# Patient Record
Sex: FEMALE | Race: WHITE | NOT HISPANIC OR LATINO | Employment: OTHER | URBAN - METROPOLITAN AREA
[De-identification: names, ages, dates, MRNs, and addresses within clinical notes are randomized per-mention and may not be internally consistent; named-entity substitution may affect disease eponyms.]

---

## 2016-05-02 LAB
CHOLESTEROL, TOTAL: 141
HDLC SERPL-MCNC: 53 MG/DL
LDLC SERPL CALC-MCNC: 62 MG/DL
TRIGL SERPL-MCNC: 132 MG/DL

## 2016-12-20 LAB — A1C: 8.9

## 2017-02-02 ENCOUNTER — OFFICE VISIT (OUTPATIENT)
Dept: INTERNAL MEDICINE | Facility: CLINIC | Age: 67
End: 2017-02-02
Payer: MEDICARE

## 2017-02-02 VITALS
OXYGEN SATURATION: 96 % | HEART RATE: 64 BPM | BODY MASS INDEX: 32.03 KG/M2 | TEMPERATURE: 96 F | WEIGHT: 187.63 LBS | DIASTOLIC BLOOD PRESSURE: 72 MMHG | HEIGHT: 64 IN | SYSTOLIC BLOOD PRESSURE: 138 MMHG

## 2017-02-02 DIAGNOSIS — E11.8 TYPE 2 DIABETES MELLITUS WITH COMPLICATION, WITH LONG-TERM CURRENT USE OF INSULIN: ICD-10-CM

## 2017-02-02 DIAGNOSIS — I15.2 HYPERTENSION ASSOCIATED WITH DIABETES: ICD-10-CM

## 2017-02-02 DIAGNOSIS — E11.59 HYPERTENSION ASSOCIATED WITH DIABETES: ICD-10-CM

## 2017-02-02 DIAGNOSIS — Z79.4 TYPE 2 DIABETES MELLITUS WITH COMPLICATION, WITH LONG-TERM CURRENT USE OF INSULIN: ICD-10-CM

## 2017-02-02 DIAGNOSIS — K59.09 CHRONIC CONSTIPATION: ICD-10-CM

## 2017-02-02 DIAGNOSIS — K21.9 GASTROESOPHAGEAL REFLUX DISEASE, ESOPHAGITIS PRESENCE NOT SPECIFIED: ICD-10-CM

## 2017-02-02 DIAGNOSIS — I25.10 CORONARY ARTERY DISEASE, ANGINA PRESENCE UNSPECIFIED, UNSPECIFIED VESSEL OR LESION TYPE, UNSPECIFIED WHETHER NATIVE OR TRANSPLANTED HEART: ICD-10-CM

## 2017-02-02 DIAGNOSIS — F33.1 MODERATE EPISODE OF RECURRENT MAJOR DEPRESSIVE DISORDER: ICD-10-CM

## 2017-02-02 DIAGNOSIS — Z86.73 HISTORY OF CVA (CEREBROVASCULAR ACCIDENT): ICD-10-CM

## 2017-02-02 DIAGNOSIS — G20.A1 PARKINSON DISEASE: ICD-10-CM

## 2017-02-02 DIAGNOSIS — Z85.3 HISTORY OF BREAST CANCER: ICD-10-CM

## 2017-02-02 DIAGNOSIS — E11.69 HYPERLIPIDEMIA ASSOCIATED WITH TYPE 2 DIABETES MELLITUS: ICD-10-CM

## 2017-02-02 DIAGNOSIS — E78.5 HYPERLIPIDEMIA ASSOCIATED WITH TYPE 2 DIABETES MELLITUS: ICD-10-CM

## 2017-02-02 PROCEDURE — 99999 PR PBB SHADOW E&M-NEW PATIENT-LVL III: CPT | Mod: PBBFAC,,, | Performed by: INTERNAL MEDICINE

## 2017-02-02 PROCEDURE — 99203 OFFICE O/P NEW LOW 30 MIN: CPT | Mod: PBBFAC,PO | Performed by: INTERNAL MEDICINE

## 2017-02-02 PROCEDURE — 99204 OFFICE O/P NEW MOD 45 MIN: CPT | Mod: S$PBB,,, | Performed by: INTERNAL MEDICINE

## 2017-02-02 RX ORDER — CARBIDOPA AND LEVODOPA 25; 100 MG/1; MG/1
2 TABLET ORAL 4 TIMES DAILY
COMMUNITY
Start: 2016-05-31

## 2017-02-02 RX ORDER — LISINOPRIL 20 MG/1
20 TABLET ORAL DAILY
COMMUNITY
End: 2017-02-02 | Stop reason: SDUPTHER

## 2017-02-02 RX ORDER — CLOPIDOGREL BISULFATE 75 MG/1
75 TABLET ORAL DAILY
COMMUNITY
Start: 2016-12-19 | End: 2017-02-02

## 2017-02-02 RX ORDER — ASPIRIN 81 MG/1
81 TABLET ORAL
COMMUNITY

## 2017-02-02 RX ORDER — GLIMEPIRIDE 4 MG/1
4 TABLET ORAL 2 TIMES DAILY
COMMUNITY
Start: 2016-11-26 | End: 2017-05-31 | Stop reason: SDUPTHER

## 2017-02-02 RX ORDER — PANTOPRAZOLE SODIUM 40 MG/1
40 TABLET, DELAYED RELEASE ORAL DAILY
COMMUNITY
Start: 2017-01-26 | End: 2017-08-14 | Stop reason: SDUPTHER

## 2017-02-02 RX ORDER — DEXTROSE 4 G
TABLET,CHEWABLE ORAL
COMMUNITY
Start: 2013-10-28

## 2017-02-02 RX ORDER — BUPROPION HYDROCHLORIDE 300 MG/1
300 TABLET ORAL DAILY
COMMUNITY
Start: 2017-01-02 | End: 2017-02-02 | Stop reason: SDUPTHER

## 2017-02-02 RX ORDER — ATORVASTATIN CALCIUM 80 MG/1
80 TABLET, FILM COATED ORAL DAILY
COMMUNITY
Start: 2016-11-04 | End: 2017-02-02 | Stop reason: SDUPTHER

## 2017-02-02 RX ORDER — BUPROPION HYDROCHLORIDE 300 MG/1
300 TABLET ORAL DAILY
Qty: 90 TABLET | Refills: 3 | Status: SHIPPED | OUTPATIENT
Start: 2017-02-02 | End: 2018-03-21 | Stop reason: SDUPTHER

## 2017-02-02 RX ORDER — LUBIPROSTONE 24 UG/1
CAPSULE, GELATIN COATED ORAL
Refills: 0 | COMMUNITY
Start: 2017-01-16 | End: 2017-02-20 | Stop reason: SDUPTHER

## 2017-02-02 RX ORDER — LISINOPRIL 20 MG/1
20 TABLET ORAL DAILY
Qty: 30 TABLET | Refills: 3 | Status: SHIPPED | OUTPATIENT
Start: 2017-02-02 | End: 2017-02-08 | Stop reason: SDUPTHER

## 2017-02-02 RX ORDER — ALPRAZOLAM 0.25 MG/1
0.25 TABLET ORAL 3 TIMES DAILY PRN
COMMUNITY
Start: 2016-10-17 | End: 2017-07-19 | Stop reason: SDUPTHER

## 2017-02-02 RX ORDER — ATORVASTATIN CALCIUM 80 MG/1
80 TABLET, FILM COATED ORAL DAILY
Qty: 90 TABLET | Refills: 3 | Status: SHIPPED | OUTPATIENT
Start: 2017-02-02 | End: 2018-02-20 | Stop reason: SDUPTHER

## 2017-02-02 RX ORDER — INSULIN GLARGINE 100 [IU]/ML
42 INJECTION, SOLUTION SUBCUTANEOUS NIGHTLY
Qty: 3 BOX | Refills: 3 | Status: SHIPPED | OUTPATIENT
Start: 2017-02-02 | End: 2017-08-10 | Stop reason: SDUPTHER

## 2017-02-02 RX ORDER — LISINOPRIL 20 MG/1
20 TABLET ORAL DAILY
Qty: 90 TABLET | Refills: 3 | Status: SHIPPED | OUTPATIENT
Start: 2017-02-02 | End: 2017-02-02 | Stop reason: SDUPTHER

## 2017-02-02 RX ORDER — LANCING DEVICE
EACH MISCELLANEOUS
COMMUNITY
Start: 2013-10-28

## 2017-02-02 RX ORDER — INSULIN GLARGINE 100 [IU]/ML
INJECTION, SOLUTION SUBCUTANEOUS
Refills: 5 | COMMUNITY
Start: 2017-01-13 | End: 2017-02-02 | Stop reason: SDUPTHER

## 2017-02-02 RX ORDER — LANCETS 28 GAUGE
1 EACH MISCELLANEOUS
COMMUNITY
Start: 2014-12-10

## 2017-02-02 NOTE — MR AVS SNAPSHOT
[facility]  [address] [facility] [address]  Phone: [phone]                  Mari Mayorga   2017 9:20 AM   Office Visit    Description:  Female : 1950   Provider:  Rob Sauceda MD   Department:  [facility]           Reason for Visit     Establish Care     Annual Exam           Diagnoses this Visit        Comments    Type 2 diabetes mellitus with complication, with long-term current use of insulin         Hypertension associated with diabetes         Hyperlipidemia associated with type 2 diabetes mellitus         Parkinson disease         Coronary artery disease, angina presence unspecified, unspecified vessel or lesion type, unspecified whether native or transplanted heart         Gastroesophageal reflux disease, esophagitis presence not specified         Chronic constipation         History of breast cancer         History of CVA (cerebrovascular accident)                To Do List           Future Appointments        Provider Department Dept Phone    2/3/2017 8:00 AM LABORATORY, [facility] Laboratory [phone]    2/3/2017 8:10 AM SPECIMEN, [facility] Specimen Laboratory [phone]    2017 8:40 AM LABORATORY, [facility] - Laboratory [phone]    5/10/2017 8:00 AM Rob Sauceda MD [facility] Internal Medicine [phone]      Goals (5 Years of Data)     None       These Medications        Disp Refills Start End    buPROPion (WELLBUTRIN XL) 300 MG 24 hr tablet 90 tablet 3 2017     Take 1 tablet (300 mg total) by mouth once daily. - Oral    Pharmacy: "[facility] - [address] Ph #: [phone]       lisinopril (PRINIVIL,ZESTRIL) 20 MG tablet 30 tablet 3 2017     Take 1 tablet (20 mg total) by mouth once daily. Takes two tablets daily - Oral    Pharmacy: [facility]/pharmacy #[id number] - [place] [address] [facility] Ph #: [phone]       atorvastatin (LIPITOR)  80 MG tablet 90 tablet 3 2/2/2017     Take 1 tablet (80 mg total) by mouth once daily. - Oral    Pharmacy: [facility] - [address] Ph #: [phone]       LANTUS SOLOSTAR 100 unit/mL (3 mL) InPn pen 3 Box 3 2/2/2017     Inject 42 Units into the skin every evening. - Subcutaneous    Pharmacy: [facility]/pharmacy #[id number] [place] [address] [facility] Ph #: [phone]         [facility] On Call     [facility] On Call Nurse Care Line - 24/7 Assistance  Registered nurses in the [facility] On Call Center provide clinical advisement, health education, appointment booking, and other advisory services.  Call for this free service at [phone].             Medications           Message regarding Medications     Verify the changes and/or additions to your medication regime listed below are the same as discussed with your clinician today.  If any of these changes or additions are incorrect, please notify your healthcare provider.        START taking these NEW medications        Refills    buPROPion (WELLBUTRIN XL) 300 MG 24 hr tablet 3    Sig: Take 1 tablet (300 mg total) by mouth once daily.    Class: Normal    Route: Oral    lisinopril (PRINIVIL,ZESTRIL) 20 MG tablet 3    Sig: Take 1 tablet (20 mg total) by mouth once daily. Takes two tablets daily    Class: Normal    Route: Oral    atorvastatin (LIPITOR) 80 MG tablet 3    Sig: Take 1 tablet (80 mg total) by mouth once daily.    Class: Normal    Route: Oral    LANTUS SOLOSTAR 100 unit/mL (3 mL) InPn pen 3    Sig: Inject 42 Units into the skin every evening.    Class: Normal    Route: Subcutaneous      STOP taking these medications     clopidogrel (PLAVIX) 75 mg tablet Take 75 mg by mouth once daily.            Verify that the below list of medications is an accurate representation of the medications you are currently taking.  If none reported, the list may be blank. If incorrect, please contact your healthcare provider. Carry this list with you  "in case of emergency.           Current Medications     alprazolam (XANAX) 0.25 MG tablet Take 0.25 mg by mouth 3 (three) times daily as needed.     AMITIZA 24 mcg Cap TAKE 1 CAPSULE BY MOUTH 2 (TWO) TIMES DAILY WITH MEALS FOR 30 DAYS.    aspirin (ECOTRIN) 81 MG EC tablet Take 81 mg by mouth.    atorvastatin (LIPITOR) 80 MG tablet Take 1 tablet (80 mg total) by mouth once daily.    blood sugar diagnostic Strp Check blood sugar (4) times daily    blood-glucose meter (CLEVER CHOICE BLOOD GLUC SYS) Misc     buPROPion (WELLBUTRIN XL) 300 MG 24 hr tablet Take 1 tablet (300 mg total) by mouth once daily.    carbidopa-levodopa  mg (SINEMET)  mg per tablet Take 2 tablets by mouth 4 (four) times daily.     glimepiride (AMARYL) 4 MG tablet Take 4 mg by mouth 2 (two) times daily.     insulin syringes, disposable, 1 mL Syrg Inject into the skin.    lancets 28 gauge Misc Inject 1 applicator into the skin.    lancing device (LANCING DEVICE WITH LANCETS) [facility]     LANTUS SOLOSTAR 100 unit/mL (3 mL) InPn pen Inject 42 Units into the skin every evening.    lisinopril (PRINIVIL,ZESTRIL) 20 MG tablet Take 1 tablet (20 mg total) by mouth once daily. Takes two tablets daily    pantoprazole (PROTONIX) 40 MG tablet Take 40 mg by mouth once daily.            Clinical Reference Information           Vital Signs - Last Recorded  Most recent update: 2/2/2017  9:25 AM by Mu Mancilla MA    BP Pulse Temp Ht Wt SpO2    138/72 64 96.4 °F (35.8 °C) (Tympanic) 5' 4" (1.626 m) 85.1 kg (187 lb 9.8 oz) 96%    BMI                32.2 kg/m2          Blood Pressure          Most Recent Value    BP  138/72      Allergies as of 2/2/2017     Morphine      Immunizations Administered on Date of Encounter - 2/2/2017     None      Orders Placed During Today's Visit      Normal Orders This Visit    Ambulatory consult to Oncology     Hemoglobin A1c     Lipid panel     Future Labs/Procedures Expected by Expires    CBC auto differential  2/2/2017 " 4/3/2018    Comprehensive metabolic panel  2/2/2017 4/3/2018    Hemoglobin A1c  2/2/2017 4/3/2018    Lipid panel  2/2/2017 4/3/2018    TSH  2/2/2017 4/3/2018    Comprehensive metabolic panel  5/3/2017 (Approximate) 4/3/2018    Hemoglobin A1c  5/3/2017 (Approximate) 4/3/2018    Lipid panel  5/3/2017 (Approximate) 4/3/2018    Protein / creatinine ratio, urine  As directed 2/23/2017

## 2017-02-02 NOTE — PROGRESS NOTES
HPI:  Patient is a 66-year-old female who comes in today for initial visit with myself to establish care.  Patient at this time has no acute complaints.  Her previous physician has retired.  Patient has numerous medical problems which have been outlined in the past medical history.  She's had diabetes for 25-30 years.  She states she's had no complications from it.  Patient has history of coronary artery disease status post stent last year.  She also reports having had the high blood pressure, cholesterol problems as well.  She is also problems with major depressive disorder.  She had a suicide attempt about 2-3 years ago.  She sees a counselor on a weekly basis.  She's had breast cancer in both breasts that different times.  Both treated with lumpectomy and radiation.  Neither required chemotherapy.  She has no idea whether or not she is brought positive.  She does not know the hormone status of her tumors.  She never took any chemotherapy for prophylaxis Posttreatment.  She also has a history of a CVA.  Daughter states that her neurologist states that she's had multiple small strokes over time.  She suffered from Parkinson's disease for several years.      Current MEDS: medcard review, verified and update  Allergies: Per the electronic medical record    Past Medical History   Diagnosis Date    CAD (coronary artery disease)      s/p stent 2016    Chronic constipation     GERD (gastroesophageal reflux disease)     History of breast cancer      bilateral breast, XRT both times, no ctx    History of CVA (cerebrovascular accident)     Hyperlipidemia associated with type 2 diabetes mellitus     Hypertension associated with diabetes     Major depression, recurrent     Parkinson disease     Type II diabetes mellitus with complication        No past surgical history on file.    SHx: per the electronic medical record    FHx: recorded in the electronic medical record    ROS:    denies any chest pains or shortness  "of breath. Denies any nausea, vomiting or diarrhea. Denies any fever, chills or sweats. Denies any change in weight, voice, stool, skin or hair. Denies any dysuria, dyspepsia or dysphagia. Denies any change in vision, hearing or headaches. Denies any swollen lymph nodes or loss of memory.    PE:  Visit Vitals    /72    Pulse 64    Temp 96.4 °F (35.8 °C) (Tympanic)    Ht 5' 4" (1.626 m)    Wt 85.1 kg (187 lb 9.8 oz)    SpO2 96%    BMI 32.2 kg/m2     Gen: Well-developed, well-nourished, female, in no acute distress, oriented x3  HEENT: neck is supple, no adenopathy, carotids 2+ equal without bruits, thyroid exam normal size without nodules.  CHEST: clear to auscultation and percussion  CVS: regular rate and rhythm without significant murmur, gallop, or rubs  ABD: soft, benign, no rebound no guarding, no distention. Bowel sounds are normal.     Nontender,  no palpable masses, no organomegaly and no audible bruits.  BREAST: no masses.  No nipple inversion, retraction, or deviation.  She has lumpectomy scars on both breasts  EXT: no clubbing, cyanosis, or edema  LYMPH: no cervical, inguinal, or axillary adenopathy  FEET: no loss of sensation.  No ulcers or pressure sores.  NEURO: gait normal.  Cranial nerves II- XII intact. No nystagmus.  Speech normal.   Gross motor and sensory unremarkable.  She has a slight tremor of her right lower extremity  PELVIC: deferred        Impression:  Multiple medical problems outlined below  Patient Active Problem List   Diagnosis    Type II diabetes mellitus with complication    Hypertension associated with diabetes    Hyperlipidemia associated with type 2 diabetes mellitus    Parkinson disease    CAD (coronary artery disease)    GERD (gastroesophageal reflux disease)    Chronic constipation    History of breast cancer    History of CVA (cerebrovascular accident)    Major depression, recurrent       Plan:   Orders Placed This Encounter    Hemoglobin A1c    Lipid " panel    Hemoglobin A1c    Lipid panel    Comprehensive metabolic panel    Comprehensive metabolic panel    Lipid panel    CBC auto differential    TSH    Protein / creatinine ratio, urine    Hemoglobin A1c    Ambulatory consult to Oncology    buPROPion (WELLBUTRIN XL) 300 MG 24 hr tablet    lisinopril (PRINIVIL,ZESTRIL) 20 MG tablet    atorvastatin (LIPITOR) 80 MG tablet    LANTUS SOLOSTAR 100 unit/mL (3 mL) InPn pen     She will have the above lab work done.  We will see her back in 3 months with additional lab work.  She'll be referred seen oncologist.  I've asked her to get copies of her records from her oncologist, neurologist, cancer surgeon, and [facility].  For now, her medications remain the same

## 2017-02-03 ENCOUNTER — LAB VISIT (OUTPATIENT)
Dept: LAB | Facility: HOSPITAL | Age: 67
End: 2017-02-03
Attending: INTERNAL MEDICINE
Payer: MEDICARE

## 2017-02-03 DIAGNOSIS — Z79.4 TYPE 2 DIABETES MELLITUS WITH COMPLICATION, WITH LONG-TERM CURRENT USE OF INSULIN: ICD-10-CM

## 2017-02-03 DIAGNOSIS — E11.8 TYPE 2 DIABETES MELLITUS WITH COMPLICATION, WITH LONG-TERM CURRENT USE OF INSULIN: ICD-10-CM

## 2017-02-03 LAB
BASOPHILS # BLD AUTO: 0.03 K/UL
BASOPHILS NFR BLD: 0.5 %
DIFFERENTIAL METHOD: NORMAL
EOSINOPHIL # BLD AUTO: 0.2 K/UL
EOSINOPHIL NFR BLD: 4 %
ERYTHROCYTE [DISTWIDTH] IN BLOOD BY AUTOMATED COUNT: 12.9 %
HCT VFR BLD AUTO: 43.4 %
HGB BLD-MCNC: 14.4 G/DL
LYMPHOCYTES # BLD AUTO: 1.1 K/UL
LYMPHOCYTES NFR BLD: 18.9 %
MCH RBC QN AUTO: 30.1 PG
MCHC RBC AUTO-ENTMCNC: 33.2 %
MCV RBC AUTO: 91 FL
MONOCYTES # BLD AUTO: 0.4 K/UL
MONOCYTES NFR BLD: 7.4 %
NEUTROPHILS # BLD AUTO: 4 K/UL
NEUTROPHILS NFR BLD: 69 %
PLATELET # BLD AUTO: 239 K/UL
PMV BLD AUTO: 10 FL
RBC # BLD AUTO: 4.78 M/UL
WBC # BLD AUTO: 5.78 K/UL

## 2017-02-03 PROCEDURE — 36415 COLL VENOUS BLD VENIPUNCTURE: CPT | Mod: PO

## 2017-02-03 PROCEDURE — 80053 COMPREHEN METABOLIC PANEL: CPT

## 2017-02-03 PROCEDURE — 80061 LIPID PANEL: CPT

## 2017-02-03 PROCEDURE — 85025 COMPLETE CBC W/AUTO DIFF WBC: CPT

## 2017-02-03 PROCEDURE — 84443 ASSAY THYROID STIM HORMONE: CPT

## 2017-02-03 PROCEDURE — 83036 HEMOGLOBIN GLYCOSYLATED A1C: CPT

## 2017-02-04 ENCOUNTER — PATIENT MESSAGE (OUTPATIENT)
Dept: INTERNAL MEDICINE | Facility: CLINIC | Age: 67
End: 2017-02-04

## 2017-02-04 LAB
ALBUMIN SERPL BCP-MCNC: 3.4 G/DL
ALP SERPL-CCNC: 72 U/L
ALT SERPL W/O P-5'-P-CCNC: 13 U/L
ANION GAP SERPL CALC-SCNC: 7 MMOL/L
AST SERPL-CCNC: 22 U/L
BILIRUB SERPL-MCNC: 0.8 MG/DL
BUN SERPL-MCNC: 20 MG/DL
CALCIUM SERPL-MCNC: 8.8 MG/DL
CHLORIDE SERPL-SCNC: 105 MMOL/L
CHOLEST/HDLC SERPL: 3.1 {RATIO}
CO2 SERPL-SCNC: 30 MMOL/L
CREAT SERPL-MCNC: 1 MG/DL
EST. GFR  (AFRICAN AMERICAN): >60 ML/MIN/1.73 M^2
EST. GFR  (NON AFRICAN AMERICAN): 58 ML/MIN/1.73 M^2
ESTIMATED AVG GLUCOSE: 232 MG/DL
GLUCOSE SERPL-MCNC: 211 MG/DL
HBA1C MFR BLD HPLC: 9.7 %
HDL/CHOLESTEROL RATIO: 32.6 %
HDLC SERPL-MCNC: 141 MG/DL
HDLC SERPL-MCNC: 46 MG/DL
LDLC SERPL CALC-MCNC: 59.8 MG/DL
NONHDLC SERPL-MCNC: 95 MG/DL
POTASSIUM SERPL-SCNC: 4.8 MMOL/L
PROT SERPL-MCNC: 6.4 G/DL
SODIUM SERPL-SCNC: 142 MMOL/L
TRIGL SERPL-MCNC: 176 MG/DL
TSH SERPL DL<=0.005 MIU/L-ACNC: 2.69 UIU/ML

## 2017-02-04 RX ORDER — METFORMIN HYDROCHLORIDE 500 MG/1
500 TABLET ORAL 2 TIMES DAILY WITH MEALS
Qty: 180 TABLET | Refills: 3 | Status: SHIPPED | OUTPATIENT
Start: 2017-02-04 | End: 2018-02-05 | Stop reason: SDUPTHER

## 2017-02-05 ENCOUNTER — PATIENT MESSAGE (OUTPATIENT)
Dept: INTERNAL MEDICINE | Facility: CLINIC | Age: 67
End: 2017-02-05

## 2017-02-07 ENCOUNTER — TELEPHONE (OUTPATIENT)
Dept: INTERNAL MEDICINE | Facility: CLINIC | Age: 67
End: 2017-02-07

## 2017-02-07 NOTE — TELEPHONE ENCOUNTER
S/w pt's daughter. She wanted to confirm that metformin was added to meds pt should  be taking and nothing discontinued. I advised per chart note and result note from Dr. Sauceda , that nothing was discontinued. Metformin was added to meds. Verbalized understanding/TGD

## 2017-02-07 NOTE — TELEPHONE ENCOUNTER
----- Message from Reymundo Dia sent at 2/7/2017  8:02 AM CST -----  Contact: Daughter- Katherin- [phone]   Pt would like to consult with the nurse about patient's diabetic medication.  Please call back @ [phone].  Thanks-[facility]

## 2017-02-08 RX ORDER — LISINOPRIL 20 MG/1
40 TABLET ORAL DAILY
Qty: 60 TABLET | Refills: 11
Start: 2017-02-08 | End: 2017-02-20 | Stop reason: SDUPTHER

## 2017-02-19 ENCOUNTER — PATIENT MESSAGE (OUTPATIENT)
Dept: INTERNAL MEDICINE | Facility: CLINIC | Age: 67
End: 2017-02-19

## 2017-02-20 RX ORDER — LISINOPRIL 20 MG/1
40 TABLET ORAL DAILY
Qty: 60 TABLET | Refills: 11 | Status: SHIPPED | OUTPATIENT
Start: 2017-02-20 | End: 2017-03-20 | Stop reason: SDUPTHER

## 2017-02-20 RX ORDER — LUBIPROSTONE 24 UG/1
CAPSULE, GELATIN COATED ORAL
Qty: 30 CAPSULE | Refills: 11 | Status: SHIPPED | OUTPATIENT
Start: 2017-02-20 | End: 2017-03-08 | Stop reason: SDUPTHER

## 2017-02-22 ENCOUNTER — INITIAL CONSULT (OUTPATIENT)
Dept: HEMATOLOGY/ONCOLOGY | Facility: CLINIC | Age: 67
End: 2017-02-22
Payer: MEDICARE

## 2017-02-22 VITALS
BODY MASS INDEX: 31.86 KG/M2 | WEIGHT: 186.63 LBS | SYSTOLIC BLOOD PRESSURE: 120 MMHG | RESPIRATION RATE: 18 BRPM | HEIGHT: 64 IN | OXYGEN SATURATION: 96 % | DIASTOLIC BLOOD PRESSURE: 70 MMHG | TEMPERATURE: 98 F | HEART RATE: 88 BPM

## 2017-02-22 DIAGNOSIS — Z86.73 HISTORY OF CVA (CEREBROVASCULAR ACCIDENT): ICD-10-CM

## 2017-02-22 DIAGNOSIS — Z79.4 TYPE 2 DIABETES MELLITUS WITH COMPLICATION, WITH LONG-TERM CURRENT USE OF INSULIN: ICD-10-CM

## 2017-02-22 DIAGNOSIS — E11.8 TYPE 2 DIABETES MELLITUS WITH COMPLICATION, WITH LONG-TERM CURRENT USE OF INSULIN: ICD-10-CM

## 2017-02-22 DIAGNOSIS — Z85.3 HISTORY OF BREAST CANCER: Primary | ICD-10-CM

## 2017-02-22 DIAGNOSIS — G20.A1 PARKINSON DISEASE: ICD-10-CM

## 2017-02-22 PROCEDURE — 99214 OFFICE O/P EST MOD 30 MIN: CPT | Mod: PBBFAC | Performed by: INTERNAL MEDICINE

## 2017-02-22 PROCEDURE — 99205 OFFICE O/P NEW HI 60 MIN: CPT | Mod: S$PBB,,, | Performed by: INTERNAL MEDICINE

## 2017-02-22 PROCEDURE — 99999 PR PBB SHADOW E&M-EST. PATIENT-LVL IV: CPT | Mod: PBBFAC,,, | Performed by: INTERNAL MEDICINE

## 2017-02-22 NOTE — LETTER
February 22, 2017      Rob Sauceda MD  [address]  [address]  [facility] [address]           [facility]  [address] [place] [address]  Phone: [phone]  Fax: [phone]          Patient: Mari Mayorga   MR Number: [id number]   YOB: 1950   Date of Visit: 2/22/2017       Dear Dr. Rob Sauceda:    Thank you for referring Mari Mayorga to me for evaluation. Attached you will find relevant portions of my assessment and plan of care.    If you have questions, please do not hesitate to call me. I look forward to following Mari Mayorga along with you.    Sincerely,    Yohana Hernández MD    Enclosure  CC:  No Recipients    If you would like to receive this communication electronically, please contact [email or web address] or [phone] to request more information on [facility] Link access.    For providers and/or their staff who would like to refer a patient to [facility], please contact us through our one-stop-shop provider referral line, [facility], at [phone].    If you feel you have received this communication in error or would no longer like to receive these types of communications, please e-mail [email or web address]

## 2017-02-22 NOTE — PROGRESS NOTES
Hematology/Oncology Consult Note    Reason for Consult: Breast cancer    Consult requested by: Dr. Rob Sauceda, Primary care    [facility] Diagnosis: Breast cancer    Stage: Unknown    Pathology: Unknown    Prior Treatment: Bilateral lumpectomy 2014 followed by bilateral adjuvant breast radiation    Current Treatment: Observation    History of Present Illness:  Ms. Bautista is a 66 y/o female with Parkinsons, CAD s/p stent, type II DM on Insulin, referred for history of breast cancer. She is a poor historian, but states he underwent bilateral lumpectomy for bilateral breast cancer in 2014 by Dr. Lewis. She was not treated with adjuvant chemotherapy, but did undergo adjuvant radiation to bilateral breasts. She didn't take any hormone blocking pills. Most recent mammogram in 1/2017 showed abnormality on R breast, which was biopsied at [facility] and was benign. She also had submandibular lymphadenopathy which was evaluated by Dr. Lewis. She just established care with Dr. Sauceda who is managing her diabetes. Her neurologist manages her h/o CVA and Parkinsons.      ROS:  General:  No wt loss, fever/chills, fatigue, night sweats  Eyes: No vision problems, pain or inflammation.   Ears/Nose: No difficultly hearing, ear pain, rhinorrhea, or epistaxis  Oropharynx: No ulcers, dysphagia, or odynophagia  Cardiovascular: No chest pains, sob, PND or dyspnea on exertion  Pulmonary: No cough, sob, hemoptysis  Gastrointestional: No n/v/d, melena, hematochezia, or change in bowel habits +chronic constipation in the past  : No dysuria, hematuria, pelvic pain or flank pain  Musculoskeletal: No myalgias, weakness, or arthralgias  Neurological: No headaches, focal deficits, or seizure activity  Endocrine: No heat or cold intolerance   Skin: No rashes pruritus, or lesions  Psychiatric: No symptoms of mood disorders  Heme/Lymph: No lymph node enlargment    Past Medical History   Diagnosis Date    Breast cancer     CAD (coronary  artery disease)      s/p stent     Chronic constipation     GERD (gastroesophageal reflux disease)     History of breast cancer      bilateral breast, XRT both times, no ctx    History of CVA (cerebrovascular accident)     Hyperlipidemia associated with type 2 diabetes mellitus     Hypertension associated with diabetes     Major depression, recurrent     Parkinson disease     Type II diabetes mellitus with complication      Past Surgical History:  1. Hysterectomy in age 30s in [facility] in [place].  2. Bilateral lumpectomy   3. Low back surgery   4. Cholecystectomy     Social History: . Quit smoking age 37 after 2-3 yrs of smoking. No EtOH/illicits.     Family History: family history includes Diabetes in her mother. Nephew  of pancreas cancer metastatic to liver.    Physical Exam:  Vitals:    17 0812   BP: 120/70   Pulse: 88   Resp: 18   Temp: 97.8 °F (36.6 °C)     Body mass index is 32.03 kg/(m^2).  General:  AAOx4, no acute distress  HEENT: EOMI. Normocephalic and atraumatic. No maxillary sinus tenderness. External auditory canals clear and TMs intact without lesions. Nasal and oral mucosal membranes moist. Normal dentition and gums.   Neck: no LAD, thyromegaly, normal ROM  Pulmonary: Bilaterally clear to auscultation, Normal effort with no accessory muscle use, no wheezes/rales/rhonchi  CV: Normal rate, regular rhythm, no murmurs/rubs/gallops, no edema  ABD:  Soft, nontender, nondistended, no mass, and without hepatosplenomegaly   Ext: No clubbing, cyanosis, or edema, normal ROM  Skin: No rashes, lesions, bruising or petechiae  Neurological: No focal deficits, CN II to XII grossly intact, normal coordination    Psychiatric:  Normal mood, affect and judgement  Hem/Lymph:  No submandibular, cervical, supraclavicular, axillary, or inguinal LAD.  Port/PICC without signs of infection    Labs:    Lab Results   Component Value Date    WBC 5.78 2017    HGB 14.4  02/03/2017    HCT 43.4 02/03/2017    MCV 91 02/03/2017     02/03/2017     BMP  Lab Results   Component Value Date     02/03/2017    K 4.8 02/03/2017     02/03/2017    CO2 30 (H) 02/03/2017    BUN 20 02/03/2017    CREATININE 1.0 02/03/2017    CALCIUM 8.8 02/03/2017    ANIONGAP 7 (L) 02/03/2017    ESTGFRAFRICA >60.0 02/03/2017    EGFRNONAA 58.0 (A) 02/03/2017     Lab Results   Component Value Date    ALT 13 02/03/2017    AST 22 02/03/2017    ALKPHOS 72 02/03/2017    BILITOT 0.8 02/03/2017       No results found for: IRON, TIBC, FERRITIN, SATURATEDIRO  No results found for: [id number]  No results found for: FOLATE  Lab Results   Component Value Date    TSH 2.695 02/03/2017       Imaging:  Outside mammogram 1/2017:    Assessment / Plan:  Mari Mayorga is a 67 y.o. female who comes in to establish care regarding her history of breast cancer.    1. Bilateral breast cancer: S/p bilateral lumpectomy and bilateral radiation per patient. No records available for my review. We have requested outside records and I will make an addendum to my note at that point.  -- Mammogram up to date in 1/2017.   -- Obtain outside records.  -- Return in 6 months for breast exam    2. Type II DM: Uncontrolled on Glimepiride and Insulin based on Hgb A1c 9.7 in 2/2017. Dr. Sauceda recently added Metformin.    3. CVA: Felt to be 2/2 amyloid angiopathy per her neurologist Dr. Padilla. Was previously on Plavix, but this was discontinued after Neurologist spoke to her Cardiologist. On Aspirin 81mg and statin.    4. Parkinsons: On Carbidopa-Levodopa    Yohana Hernández M.D.  Hematology Oncology

## 2017-03-06 ENCOUNTER — PATIENT MESSAGE (OUTPATIENT)
Dept: INTERNAL MEDICINE | Facility: CLINIC | Age: 67
End: 2017-03-06

## 2017-03-08 RX ORDER — LUBIPROSTONE 24 UG/1
CAPSULE, GELATIN COATED ORAL
Qty: 180 CAPSULE | Refills: 3 | Status: SHIPPED | OUTPATIENT
Start: 2017-03-08 | End: 2017-08-14 | Stop reason: SDUPTHER

## 2017-03-08 NOTE — TELEPHONE ENCOUNTER
Pt requesting the following refill be sent to [facility]. Prescription went to local pharmacy but pt needs to go to mailorder. OSMAN 02/02/17.  /DARRYL

## 2017-03-21 RX ORDER — LISINOPRIL 20 MG/1
40 TABLET ORAL DAILY
Qty: 180 TABLET | Refills: 3 | Status: SHIPPED | OUTPATIENT
Start: 2017-03-21 | End: 2017-08-01

## 2017-05-01 ENCOUNTER — PATIENT OUTREACH (OUTPATIENT)
Dept: ADMINISTRATIVE | Facility: HOSPITAL | Age: 67
End: 2017-05-01

## 2017-05-01 DIAGNOSIS — Z11.59 NEED FOR HEPATITIS C SCREENING TEST: Primary | ICD-10-CM

## 2017-05-04 ENCOUNTER — TELEPHONE (OUTPATIENT)
Dept: INTERNAL MEDICINE | Facility: CLINIC | Age: 67
End: 2017-05-04

## 2017-05-04 ENCOUNTER — OFFICE VISIT (OUTPATIENT)
Dept: INTERNAL MEDICINE | Facility: CLINIC | Age: 67
End: 2017-05-04
Payer: MEDICARE

## 2017-05-04 ENCOUNTER — LAB VISIT (OUTPATIENT)
Dept: LAB | Facility: HOSPITAL | Age: 67
End: 2017-05-04
Attending: INTERNAL MEDICINE
Payer: MEDICARE

## 2017-05-04 VITALS
SYSTOLIC BLOOD PRESSURE: 102 MMHG | BODY MASS INDEX: 33.42 KG/M2 | HEIGHT: 64 IN | OXYGEN SATURATION: 97 % | WEIGHT: 195.75 LBS | HEART RATE: 101 BPM | TEMPERATURE: 97 F | DIASTOLIC BLOOD PRESSURE: 58 MMHG

## 2017-05-04 DIAGNOSIS — Z79.4 TYPE 2 DIABETES MELLITUS WITH COMPLICATION, WITH LONG-TERM CURRENT USE OF INSULIN: ICD-10-CM

## 2017-05-04 DIAGNOSIS — Z11.59 NEED FOR HEPATITIS C SCREENING TEST: ICD-10-CM

## 2017-05-04 DIAGNOSIS — B37.2 CANDIDAL INTERTRIGO: Primary | ICD-10-CM

## 2017-05-04 DIAGNOSIS — E11.8 TYPE 2 DIABETES MELLITUS WITH COMPLICATION, WITH LONG-TERM CURRENT USE OF INSULIN: ICD-10-CM

## 2017-05-04 LAB
ALBUMIN SERPL BCP-MCNC: 3.7 G/DL
ALP SERPL-CCNC: 72 U/L
ALT SERPL W/O P-5'-P-CCNC: 23 U/L
ANION GAP SERPL CALC-SCNC: 9 MMOL/L
AST SERPL-CCNC: 19 U/L
BILIRUB SERPL-MCNC: 1.1 MG/DL
BUN SERPL-MCNC: 22 MG/DL
CALCIUM SERPL-MCNC: 9.8 MG/DL
CHLORIDE SERPL-SCNC: 104 MMOL/L
CHOLEST/HDLC SERPL: 2.9 {RATIO}
CO2 SERPL-SCNC: 29 MMOL/L
CREAT SERPL-MCNC: 1 MG/DL
EST. GFR  (AFRICAN AMERICAN): >60 ML/MIN/1.73 M^2
EST. GFR  (NON AFRICAN AMERICAN): 57.6 ML/MIN/1.73 M^2
GLUCOSE SERPL-MCNC: 153 MG/DL
HDL/CHOLESTEROL RATIO: 34.1 %
HDLC SERPL-MCNC: 135 MG/DL
HDLC SERPL-MCNC: 46 MG/DL
LDLC SERPL CALC-MCNC: 66.2 MG/DL
NONHDLC SERPL-MCNC: 89 MG/DL
POTASSIUM SERPL-SCNC: 4.9 MMOL/L
PROT SERPL-MCNC: 7 G/DL
SODIUM SERPL-SCNC: 142 MMOL/L
TRIGL SERPL-MCNC: 114 MG/DL

## 2017-05-04 PROCEDURE — 99213 OFFICE O/P EST LOW 20 MIN: CPT | Mod: PBBFAC,PO | Performed by: FAMILY MEDICINE

## 2017-05-04 PROCEDURE — 99999 PR PBB SHADOW E&M-EST. PATIENT-LVL III: CPT | Mod: PBBFAC,,, | Performed by: FAMILY MEDICINE

## 2017-05-04 PROCEDURE — 99213 OFFICE O/P EST LOW 20 MIN: CPT | Mod: S$PBB,,, | Performed by: FAMILY MEDICINE

## 2017-05-04 RX ORDER — DOXYLAMINE SUCCINATE 25 MG
TABLET ORAL 2 TIMES DAILY
Qty: 57 G | Refills: 3 | Status: SHIPPED | OUTPATIENT
Start: 2017-05-04 | End: 2018-10-03

## 2017-05-04 RX ORDER — AMLODIPINE BESYLATE 2.5 MG/1
2.5 TABLET ORAL 2 TIMES DAILY
Refills: 5 | COMMUNITY
Start: 2017-04-29 | End: 2018-09-11

## 2017-05-04 RX ORDER — FLUCONAZOLE 150 MG/1
150 TABLET ORAL
Qty: 8 TABLET | Refills: 0 | Status: SHIPPED | OUTPATIENT
Start: 2017-05-04 | End: 2017-06-23

## 2017-05-04 RX ORDER — NYSTATIN 100000 [USP'U]/G
POWDER TOPICAL 2 TIMES DAILY
Qty: 60 G | Refills: 6 | Status: SHIPPED | OUTPATIENT
Start: 2017-05-04 | End: 2018-10-03

## 2017-05-04 NOTE — PROGRESS NOTES
"  Chief Complaint:    Chief Complaint   Patient presents with    Abdominal Pain     due to open wound in fold of skin       History of Present Illness:  Presents today complaining of 4 rash between the fatty folds of abdomen.      ROS:  Review of Systems    Past Medical History:   Diagnosis Date    Breast cancer     CAD (coronary artery disease)     s/p stent 2016    Chronic constipation     GERD (gastroesophageal reflux disease)     History of breast cancer     bilateral breast, XRT both times, no ctx    History of CVA (cerebrovascular accident)     Hyperlipidemia associated with type 2 diabetes mellitus     Hypertension associated with diabetes     Major depression, recurrent     Parkinson disease     Type II diabetes mellitus with complication        Social History:  Social History     Social History    Marital status:      Spouse name: N/A    Number of children: N/A    Years of education: N/A     Social History Main Topics    Smoking status: Former Smoker    Smokeless tobacco: Never Used    Alcohol use No    Drug use: No    Sexual activity: No     Other Topics Concern    Not on file     Social History Narrative    No narrative on file       Family History:   family history includes Diabetes in her mother.    Health Maintenance   Topic Date Due    Hepatitis C Screening  1950    Eye Exam  02/07/1958    TETANUS VACCINE  02/07/1966    Colonoscopy  02/07/1998    Zoster Vaccine  02/07/2008    Influenza Vaccine  08/01/2017    Hemoglobin A1c  08/03/2017    Pneumococcal (65+) (2 of 2 - PPSV23) 01/16/2018    Foot Exam  02/02/2018    Lipid Panel  02/03/2018    Mammogram  12/20/2018    DEXA SCAN  06/18/2019       Physical Exam:    Vital Signs  Temp: 97.1 °F (36.2 °C)  Temp src: Tympanic  Pulse: 101  SpO2: 97 %  BP: (!) 102/58  BP Location: Left arm  BP Method: Manual  Patient Position: Sitting  Pain Score: 10-Worst pain ever  Pain Loc: Abdomen  Height and Weight  Height: 5' 4" " (162.6 cm)  Weight: 88.8 kg (195 lb 12.3 oz)  BSA (Calculated - sq m): 2 sq meters  BMI (Calculated): 33.7  Weight in (lb) to have BMI = 25: 145.3]    Body mass index is 33.6 kg/(m^2).    Physical Exam   Abdominal:           Lab Results   Component Value Date    CHOL 141 02/03/2017    TRIG 176 (H) 02/03/2017    TRIG 132 05/02/2016    HDL 46 02/03/2017    HDL 53 05/02/2016    TOTALCHOLEST 3.1 02/03/2017    NONHDLCHOL 95 02/03/2017       Lab Results   Component Value Date    HGBA1C 9.7 (H) 02/03/2017       Assessment:      ICD-10-CM ICD-9-CM   1. Candidal intertrigo B37.2 112.3         Plan:  3.  Diflucan 150 mg 2 times a week for 4 weeks, nystatin powder every day.  Important to keep blood sugars in check.  No orders of the defined types were placed in this encounter.      Current Outpatient Prescriptions   Medication Sig Dispense Refill    alprazolam (XANAX) 0.25 MG tablet Take 0.25 mg by mouth 3 (three) times daily as needed.       AMITIZA 24 mcg Cap TAKE 1 CAPSULE BY MOUTH 2 (TWO) TIMES DAILY WITH MEALS FOR 30 DAYS. 180 capsule 3    amlodipine (NORVASC) 2.5 MG tablet Take 2.5 mg by mouth once daily.  5    aspirin (ECOTRIN) 81 MG EC tablet Take 81 mg by mouth.      atorvastatin (LIPITOR) 80 MG tablet Take 1 tablet (80 mg total) by mouth once daily. 90 tablet 3    blood sugar diagnostic Strp Check blood sugar (4) times daily      blood-glucose meter (CLEVER CHOICE BLOOD GLUC SYS) Misc       buPROPion (WELLBUTRIN XL) 300 MG 24 hr tablet Take 1 tablet (300 mg total) by mouth once daily. 90 tablet 3    carbidopa-levodopa  mg (SINEMET)  mg per tablet Take 2 tablets by mouth 4 (four) times daily.       fluconazole (DIFLUCAN) 150 MG Tab Take 1 tablet (150 mg total) by mouth twice a week. 8 tablet 0    glimepiride (AMARYL) 4 MG tablet Take 4 mg by mouth 2 (two) times daily.       insulin syringes, disposable, 1 mL Syrg Inject into the skin.      lancets 28 gauge Misc Inject 1 applicator into the  skin.      lancing device (LANCING DEVICE WITH LANCETS) [facility]       LANTUS SOLOSTAR 100 unit/mL (3 mL) InPn pen Inject 42 Units into the skin every evening. 3 Box 3    lisinopril (PRINIVIL,ZESTRIL) 20 MG tablet Take 2 tablets (40 mg total) by mouth once daily. 180 tablet 3    metformin (GLUCOPHAGE) 500 MG tablet Take 1 tablet (500 mg total) by mouth 2 (two) times daily with meals. 180 tablet 3    miconazole (MICOTIN) 2 % cream Apply topically 2 (two) times daily. 57 g 3    nystatin (MYCOSTATIN) powder Apply topically 2 (two) times daily. 60 g 6    pantoprazole (PROTONIX) 40 MG tablet Take 40 mg by mouth once daily.        No current facility-administered medications for this visit.        There are no discontinued medications.    No Follow-up on file.      Dr Rachid South MD    Disclaimer: This note is prepared using voice recognition system and as such is likely to have errors and is not proof read.

## 2017-05-04 NOTE — TELEPHONE ENCOUNTER
----- Message from Melissa Damian sent at 5/4/2017 10:54 AM CDT -----  Contact: Ms Pandey  /  daughter  Patient has rash under stomach area red and bleeding  Patient need appointment today   Please call Ms Pandey [phone] for more detail info

## 2017-05-04 NOTE — TELEPHONE ENCOUNTER
S/w pt's daughter. Scheduled an appt with Dr. Suoth today to be evaluated for rash under flap of skin/TGD

## 2017-05-05 LAB
ESTIMATED AVG GLUCOSE: 186 MG/DL
HBA1C MFR BLD HPLC: 8.1 %
HCV AB SERPL QL IA: NEGATIVE

## 2017-05-10 ENCOUNTER — OFFICE VISIT (OUTPATIENT)
Dept: INTERNAL MEDICINE | Facility: CLINIC | Age: 67
End: 2017-05-10
Payer: MEDICARE

## 2017-05-10 VITALS
WEIGHT: 194.25 LBS | BODY MASS INDEX: 33.16 KG/M2 | OXYGEN SATURATION: 97 % | HEART RATE: 88 BPM | TEMPERATURE: 97 F | HEIGHT: 64 IN | SYSTOLIC BLOOD PRESSURE: 130 MMHG | DIASTOLIC BLOOD PRESSURE: 70 MMHG

## 2017-05-10 DIAGNOSIS — Z79.4 TYPE 2 DIABETES MELLITUS WITH COMPLICATION, WITH LONG-TERM CURRENT USE OF INSULIN: ICD-10-CM

## 2017-05-10 DIAGNOSIS — E11.69 HYPERLIPIDEMIA ASSOCIATED WITH TYPE 2 DIABETES MELLITUS: Primary | ICD-10-CM

## 2017-05-10 DIAGNOSIS — E11.8 TYPE 2 DIABETES MELLITUS WITH COMPLICATION, WITH LONG-TERM CURRENT USE OF INSULIN: ICD-10-CM

## 2017-05-10 DIAGNOSIS — Z85.3 HISTORY OF BREAST CANCER: ICD-10-CM

## 2017-05-10 DIAGNOSIS — E78.5 HYPERLIPIDEMIA ASSOCIATED WITH TYPE 2 DIABETES MELLITUS: Primary | ICD-10-CM

## 2017-05-10 DIAGNOSIS — E11.59 HYPERTENSION ASSOCIATED WITH DIABETES: ICD-10-CM

## 2017-05-10 DIAGNOSIS — G20.A1 PARKINSON DISEASE: ICD-10-CM

## 2017-05-10 DIAGNOSIS — I15.2 HYPERTENSION ASSOCIATED WITH DIABETES: ICD-10-CM

## 2017-05-10 DIAGNOSIS — I25.10 CORONARY ARTERY DISEASE, ANGINA PRESENCE UNSPECIFIED, UNSPECIFIED VESSEL OR LESION TYPE, UNSPECIFIED WHETHER NATIVE OR TRANSPLANTED HEART: ICD-10-CM

## 2017-05-10 PROCEDURE — 99999 PR PBB SHADOW E&M-EST. PATIENT-LVL III: CPT | Mod: PBBFAC,,, | Performed by: INTERNAL MEDICINE

## 2017-05-10 PROCEDURE — 99213 OFFICE O/P EST LOW 20 MIN: CPT | Mod: PBBFAC,PO | Performed by: INTERNAL MEDICINE

## 2017-05-10 PROCEDURE — 99213 OFFICE O/P EST LOW 20 MIN: CPT | Mod: S$PBB,,, | Performed by: INTERNAL MEDICINE

## 2017-05-10 NOTE — MR AVS SNAPSHOT
[facility]  [address] [place] [address]  Phone: [phone]                  Mari Mayorga   5/10/2017 8:00 AM   Office Visit    Description:  Female : 1950   Provider:  Rob Sauceda MD   Department:  [facility]           Reason for Visit     3 month follow up           Diagnoses this Visit        Comments    Hyperlipidemia associated with type 2 diabetes mellitus    -  Primary     Hypertension associated with diabetes         Type 2 diabetes mellitus with complication, with long-term current use of insulin         Coronary artery disease, angina presence unspecified, unspecified vessel or lesion type, unspecified whether native or transplanted heart         Parkinson disease         History of breast cancer                To Do List           Future Appointments        Provider Department Dept Phone    2017 11:00 AM Yohana Hernández MD [facility] - Hematology Oncology [phone]      Goals (5 Years of Data)     None      Follow-Up and Disposition     Return in about 3 months (around 8/10/2017).      [facility] On Call     [facility] On Call Nurse Care Line -  Assistance  Unless otherwise directed by your provider, please contact [facility] On-Call, our nurse care line that is available for  assistance.     Registered nurses in the [facility] On Call Center provide: appointment scheduling, clinical advisement, health education, and other advisory services.  Call: [phone] (toll free)               Medications           Message regarding Medications     Verify the changes and/or additions to your medication regime listed below are the same as discussed with your clinician today.  If any of these changes or additions are incorrect, please notify your healthcare provider.             Verify that the below list of medications is an accurate representation of the medications you are currently taking.  If none reported, the list may be  "blank. If incorrect, please contact your healthcare provider. Carry this list with you in case of emergency.           Current Medications     alprazolam (XANAX) 0.25 MG tablet Take 0.25 mg by mouth 3 (three) times daily as needed.     AMITIZA 24 mcg Cap TAKE 1 CAPSULE BY MOUTH 2 (TWO) TIMES DAILY WITH MEALS FOR 30 DAYS.    amlodipine (NORVASC) 2.5 MG tablet Take 2.5 mg by mouth once daily.    aspirin (ECOTRIN) 81 MG EC tablet Take 81 mg by mouth.    atorvastatin (LIPITOR) 80 MG tablet Take 1 tablet (80 mg total) by mouth once daily.    blood sugar diagnostic Strp Check blood sugar (4) times daily    blood-glucose meter (CLEVER CHOICE BLOOD GLUC SYS) Misc     buPROPion (WELLBUTRIN XL) 300 MG 24 hr tablet Take 1 tablet (300 mg total) by mouth once daily.    carbidopa-levodopa  mg (SINEMET)  mg per tablet Take 2 tablets by mouth 4 (four) times daily.     fluconazole (DIFLUCAN) 150 MG Tab Take 1 tablet (150 mg total) by mouth twice a week.    glimepiride (AMARYL) 4 MG tablet Take 4 mg by mouth 2 (two) times daily.     insulin syringes, disposable, 1 mL Syrg Inject into the skin.    lancets 28 gauge Misc Inject 1 applicator into the skin.    lancing device (LANCING DEVICE WITH LANCETS) [facility]     LANTUS SOLOSTAR 100 unit/mL (3 mL) InPn pen Inject 42 Units into the skin every evening.    lisinopril (PRINIVIL,ZESTRIL) 20 MG tablet Take 2 tablets (40 mg total) by mouth once daily.    metformin (GLUCOPHAGE) 500 MG tablet Take 1 tablet (500 mg total) by mouth 2 (two) times daily with meals.    miconazole (MICOTIN) 2 % cream Apply topically 2 (two) times daily.    nystatin (MYCOSTATIN) powder Apply topically 2 (two) times daily.    pantoprazole (PROTONIX) 40 MG tablet Take 40 mg by mouth once daily.            Clinical Reference Information           Your Vitals Were     BP Pulse Temp Height Weight SpO2    130/70 88 97 °F (36.1 °C) (Tympanic) 5' 4" (1.626 m) 88.1 kg (194 lb 3.6 oz) 97%    BMI                33.34 " kg/m2          Blood Pressure          Most Recent Value    BP  130/70      Allergies as of 5/10/2017     Morphine      Immunizations Administered on Date of Encounter - 5/10/2017     None      Orders Placed During Today's Visit     Future Labs/Procedures Expected by Expires    Basic metabolic panel  8/8/2017 (Approximate) 7/9/2018    Hemoglobin A1c  8/8/2017 (Approximate) 7/9/2018      Language Assistance Services     ATTENTION: Language assistance services are available, free of charge. Please call [phone].      ATENCIÓN: Si habla chilango, tiene a sena disposición servicios gratuitos de asistencia lingüística. Llame al [phone].     CHÚ Ý: N?u b?n nói Ti?ng Vi?t, có các d?ch v? h? tr? ngôn ng? mi?n phí dành cho b?n. G?i s? [phone].         [facility] complies with applicable Federal civil rights laws and does not discriminate on the basis of race, color, national origin, age, disability, or sex.

## 2017-05-10 NOTE — PROGRESS NOTES
"HPI:  Patient is a 67-year-old female who comes in today for follow-up of her diabetes, hypertension, lipids.  Last 3 months She's had 2-3 low sugars.  These all occurred during the night.  The lowest reading was 35.  Otherwise she's been doing well.  Her sugar readings have been much better.  Otherwise, she is been doing fine and has no other complaints    Current meds have been verified and updated per the EMR  Exam:/70  Pulse 88  Temp 97 °F (36.1 °C) (Tympanic)   Ht 5' 4" (1.626 m)  Wt 88.1 kg (194 lb 3.6 oz)  SpO2 97%  BMI 33.34 kg/m2  Exam deferred    Lab Results   Component Value Date    WBC 5.78 02/03/2017    HGB 14.4 02/03/2017    HCT 43.4 02/03/2017     02/03/2017    CHOL 135 05/04/2017    TRIG 114 05/04/2017    HDL 46 05/04/2017    ALT 23 05/04/2017    AST 19 05/04/2017     05/04/2017    K 4.9 05/04/2017     05/04/2017    CREATININE 1.0 05/04/2017    BUN 22 05/04/2017    CO2 29 05/04/2017    TSH 2.695 02/03/2017    HGBA1C 8.1 (H) 05/04/2017       Impression:  Diabetes, controlled, markedly improved  Multiple other medical problems below, stable  Patient Active Problem List   Diagnosis    Type II diabetes mellitus with complication    Hypertension associated with diabetes    Hyperlipidemia associated with type 2 diabetes mellitus    Parkinson disease    CAD (coronary artery disease)    GERD (gastroesophageal reflux disease)    Chronic constipation    History of breast cancer    History of CVA (cerebrovascular accident)    Major depression, recurrent       Plan:  Orders Placed This Encounter    Hemoglobin A1c    Basic metabolic panel     She will continue with her current medications.  I will see her back in 3 months with the above lab work.  She's been encouraged to follow-up with appropriate diet and exercise daily.  I used encourage her to go walking for 30 minutes every day    "

## 2017-06-01 RX ORDER — GLIMEPIRIDE 4 MG/1
4 TABLET ORAL
Qty: 30 TABLET | Refills: 11 | Status: SHIPPED | OUTPATIENT
Start: 2017-06-01 | End: 2017-07-03 | Stop reason: SDUPTHER

## 2017-06-23 ENCOUNTER — OFFICE VISIT (OUTPATIENT)
Dept: INTERNAL MEDICINE | Facility: CLINIC | Age: 67
End: 2017-06-23
Payer: MEDICARE

## 2017-06-23 VITALS
OXYGEN SATURATION: 98 % | SYSTOLIC BLOOD PRESSURE: 130 MMHG | HEIGHT: 64 IN | DIASTOLIC BLOOD PRESSURE: 70 MMHG | WEIGHT: 195.31 LBS | HEART RATE: 90 BPM | TEMPERATURE: 98 F | BODY MASS INDEX: 33.34 KG/M2

## 2017-06-23 DIAGNOSIS — B37.2 CANDIDIASIS OF SKIN: Primary | ICD-10-CM

## 2017-06-23 DIAGNOSIS — I15.2 HYPERTENSION ASSOCIATED WITH DIABETES: ICD-10-CM

## 2017-06-23 DIAGNOSIS — E11.59 HYPERTENSION ASSOCIATED WITH DIABETES: ICD-10-CM

## 2017-06-23 PROCEDURE — 99213 OFFICE O/P EST LOW 20 MIN: CPT | Mod: PBBFAC,PO | Performed by: INTERNAL MEDICINE

## 2017-06-23 PROCEDURE — 99999 PR PBB SHADOW E&M-EST. PATIENT-LVL III: CPT | Mod: PBBFAC,,, | Performed by: INTERNAL MEDICINE

## 2017-06-23 PROCEDURE — 4010F ACE/ARB THERAPY RXD/TAKEN: CPT | Mod: ,,, | Performed by: INTERNAL MEDICINE

## 2017-06-23 PROCEDURE — 3045F PR MOST RECENT HEMOGLOBIN A1C LEVEL 7.0-9.0%: CPT | Mod: ,,, | Performed by: INTERNAL MEDICINE

## 2017-06-23 PROCEDURE — 1159F MED LIST DOCD IN RCRD: CPT | Mod: ,,, | Performed by: INTERNAL MEDICINE

## 2017-06-23 PROCEDURE — 1126F AMNT PAIN NOTED NONE PRSNT: CPT | Mod: ,,, | Performed by: INTERNAL MEDICINE

## 2017-06-23 PROCEDURE — 99213 OFFICE O/P EST LOW 20 MIN: CPT | Mod: S$PBB,,, | Performed by: INTERNAL MEDICINE

## 2017-06-23 RX ORDER — FLUCONAZOLE 100 MG/1
100 TABLET ORAL DAILY
Qty: 10 TABLET | Refills: 0 | Status: SHIPPED | OUTPATIENT
Start: 2017-06-23 | End: 2017-07-03

## 2017-06-23 NOTE — PROGRESS NOTES
"HPI:  Patient is a 67-year-old female who comes in today with problems of recurrent yeast infection lower abdominal area where she has an overlapping skin fold.  She's been using multiple creams without benefit    Current meds have been verified and updated per the EMR  Exam:/70   Pulse 90   Temp 97.5 °F (36.4 °C) (Tympanic)   Ht 5' 4" (1.626 m)   Wt 88.6 kg (195 lb 5.2 oz)   SpO2 98%   BMI 33.53 kg/m²   She has a yeast infection in her lower abdominal wall where she has overlapping skin fold due to her obesity    Lab Results   Component Value Date    WBC 5.78 02/03/2017    HGB 14.4 02/03/2017    HCT 43.4 02/03/2017     02/03/2017    CHOL 135 05/04/2017    TRIG 114 05/04/2017    HDL 46 05/04/2017    ALT 23 05/04/2017    AST 19 05/04/2017     05/04/2017    K 4.9 05/04/2017     05/04/2017    CREATININE 1.0 05/04/2017    BUN 22 05/04/2017    CO2 29 05/04/2017    TSH 2.695 02/03/2017    HGBA1C 8.1 (H) 05/04/2017       Impression:  Yeast infection  Patient Active Problem List   Diagnosis    Type II diabetes mellitus with complication    Hypertension associated with diabetes    Hyperlipidemia associated with type 2 diabetes mellitus    Parkinson disease    CAD (coronary artery disease)    GERD (gastroesophageal reflux disease)    Chronic constipation    History of breast cancer    History of CVA (cerebrovascular accident)    Major depression, recurrent       Plan:  Orders Placed This Encounter    fluconazole (DIFLUCAN) 100 MG tablet     She was given Diflucan 100 mg a day daily for 10 days.  She was told she has to keep the area extremely dry.  We discussed ways to do that.  She may continue to use her creams 3 times a day.    "

## 2017-07-03 ENCOUNTER — TELEPHONE (OUTPATIENT)
Dept: INTERNAL MEDICINE | Facility: CLINIC | Age: 67
End: 2017-07-03

## 2017-07-03 NOTE — TELEPHONE ENCOUNTER
----- Message from Yeni Duval sent at 7/3/2017  1:34 PM CDT -----  ...1. What is the name of the medication you are requesting?Glemapride   2. What is the dose? 4 mg  3. How do you take the medication? Orally, topically, etc? orally  4. How often do you take this medication? Morning and evening 1 tablet   5. Do you need a 30 day or 90 day supply? 30  6. How many refills are you requesting? 1  7. What is your preferred pharmacy and location of the pharmacy? ...  [facility]/pharmacy [facility][id number] - [place] - [address] [facility]  [address] [address] [facility] [address]  Phone: [phone] Fax: [phone]    8. Who can we contact with further questions? Please call pt back at [phone]

## 2017-07-03 NOTE — TELEPHONE ENCOUNTER
----- Message from Yvette Connolly sent at 7/3/2017 11:24 AM CDT -----  Contact: Patient  Patient called to request a refill.    1. What is the name of the medication you are requesting? glimepiride  2. What is the dose? 4 mg  3. How do you take the medication? Orally, topically, etc? orally  4. How often do you take this medication? 2 times daily  5. Do you need a 30 day or 90 day supply? 90 day   6. How many refills are you requesting? 3  7. What is your preferred pharmacy and location of the pharmacy?     [facility]    8. Who can we contact with further questions? Mari [phone].    Thanks,  Yvette

## 2017-07-03 NOTE — TELEPHONE ENCOUNTER
----- Message from Ade Hamilton sent at 7/3/2017 10:00 AM CDT -----  Contact: Pt  Pt called and stated she needed to speak to the nurse. She stated she was calling to follow up on a refill request for glimepiride. She can be reached at [phone].    Thanks,  Trf

## 2017-07-04 RX ORDER — GLIMEPIRIDE 4 MG/1
4 TABLET ORAL
Qty: 90 TABLET | Refills: 3 | Status: SHIPPED | OUTPATIENT
Start: 2017-07-04 | End: 2017-07-31 | Stop reason: SDUPTHER

## 2017-07-14 ENCOUNTER — TELEPHONE (OUTPATIENT)
Dept: INTERNAL MEDICINE | Facility: CLINIC | Age: 67
End: 2017-07-14

## 2017-07-14 NOTE — TELEPHONE ENCOUNTER
Returned call to Rosi paez/ Bill ARRIOLA. Requested a copy of the most recent A1C. Fax to Bill ARRIOLA @ [phone].  Faxed/DARRYL

## 2017-07-14 NOTE — TELEPHONE ENCOUNTER
----- Message from Lore Grossman sent at 7/14/2017  9:45 AM CDT -----  Contact: Bill Da Silva  Calling concerning the results of hemoglobin A1C on patient. Please call Rekha PICKETT today @ [phone]. Thanks, marsha

## 2017-07-19 RX ORDER — ALPRAZOLAM 0.25 MG/1
0.25 TABLET ORAL 3 TIMES DAILY PRN
Qty: 60 TABLET | Refills: 5 | Status: SHIPPED | OUTPATIENT
Start: 2017-07-19 | End: 2017-11-15 | Stop reason: SDUPTHER

## 2017-07-31 ENCOUNTER — PATIENT MESSAGE (OUTPATIENT)
Dept: INTERNAL MEDICINE | Facility: CLINIC | Age: 67
End: 2017-07-31

## 2017-07-31 ENCOUNTER — TELEPHONE (OUTPATIENT)
Dept: INTERNAL MEDICINE | Facility: CLINIC | Age: 67
End: 2017-07-31

## 2017-07-31 RX ORDER — GLIMEPIRIDE 4 MG/1
4 TABLET ORAL
Qty: 180 TABLET | Refills: 3 | Status: SHIPPED | OUTPATIENT
Start: 2017-07-31 | End: 2017-08-10 | Stop reason: SDUPTHER

## 2017-07-31 NOTE — TELEPHONE ENCOUNTER
"S/w pt's daughter, Katherin. Stated, " Having pain from neck and into arm and into her hand. ". Denies speech issue. Verbalized understanding/TGD  "

## 2017-07-31 NOTE — TELEPHONE ENCOUNTER
----- Message from Ana Cristina Piña sent at 7/31/2017 10:13 AM CDT -----  Contact: pt daughter  pt daughter Mickey, mother is having pain in shoulder that's shooting down into her arm and into her first two finger, they need to know if to come see dr or go to a specialist,, please call pt daughter mickey back at [phone]

## 2017-07-31 NOTE — TELEPHONE ENCOUNTER
Please advise on refill. Originally refilled on 07/04/17 for only once daily instead of BID ac. Pt has always taken BID per pt's daughter. Please advise. /.TGD

## 2017-08-01 ENCOUNTER — OFFICE VISIT (OUTPATIENT)
Dept: INTERNAL MEDICINE | Facility: CLINIC | Age: 67
End: 2017-08-01
Payer: MEDICARE

## 2017-08-01 ENCOUNTER — TELEPHONE (OUTPATIENT)
Dept: INTERNAL MEDICINE | Facility: CLINIC | Age: 67
End: 2017-08-01

## 2017-08-01 ENCOUNTER — HOSPITAL ENCOUNTER (OUTPATIENT)
Dept: RADIOLOGY | Facility: HOSPITAL | Age: 67
Discharge: HOME | End: 2017-08-01
Attending: NURSE PRACTITIONER
Payer: MEDICARE

## 2017-08-01 VITALS
WEIGHT: 198 LBS | HEIGHT: 63 IN | HEART RATE: 88 BPM | BODY MASS INDEX: 35.08 KG/M2 | TEMPERATURE: 98 F | OXYGEN SATURATION: 95 % | DIASTOLIC BLOOD PRESSURE: 78 MMHG | SYSTOLIC BLOOD PRESSURE: 136 MMHG

## 2017-08-01 DIAGNOSIS — M54.2 NECK PAIN ON RIGHT SIDE: Primary | ICD-10-CM

## 2017-08-01 DIAGNOSIS — M54.2 NECK PAIN ON RIGHT SIDE: ICD-10-CM

## 2017-08-01 PROCEDURE — 99999 PR PBB SHADOW E&M-EST. PATIENT-LVL IV: CPT | Mod: PBBFAC,,, | Performed by: NURSE PRACTITIONER

## 2017-08-01 PROCEDURE — 1125F AMNT PAIN NOTED PAIN PRSNT: CPT | Mod: ,,, | Performed by: NURSE PRACTITIONER

## 2017-08-01 PROCEDURE — 1159F MED LIST DOCD IN RCRD: CPT | Mod: ,,, | Performed by: NURSE PRACTITIONER

## 2017-08-01 PROCEDURE — 99213 OFFICE O/P EST LOW 20 MIN: CPT | Mod: S$PBB,,, | Performed by: NURSE PRACTITIONER

## 2017-08-01 PROCEDURE — 72040 X-RAY EXAM NECK SPINE 2-3 VW: CPT | Mod: 26,,, | Performed by: RADIOLOGY

## 2017-08-01 PROCEDURE — 72040 X-RAY EXAM NECK SPINE 2-3 VW: CPT | Mod: TC,PO

## 2017-08-01 RX ORDER — GABAPENTIN 100 MG/1
100 CAPSULE ORAL NIGHTLY
Refills: 1 | COMMUNITY
Start: 2017-07-14 | End: 2017-11-15

## 2017-08-01 RX ORDER — LISINOPRIL 40 MG/1
1 TABLET ORAL DAILY
COMMUNITY
Start: 2017-06-26 | End: 2020-07-28 | Stop reason: SDUPTHER

## 2017-08-01 RX ORDER — CYCLOBENZAPRINE HCL 10 MG
10 TABLET ORAL 3 TIMES DAILY PRN
Qty: 60 TABLET | Refills: 0 | Status: SHIPPED | OUTPATIENT
Start: 2017-08-01 | End: 2017-08-11

## 2017-08-01 NOTE — PROGRESS NOTES
"Subjective:      Patient ID: Mari Mayorga is a 67 y.o. female.    Chief Complaint: Arm Pain (right arm)    HPI:  Patient is with her daughter today.  She says about a week ago she woke up with pain to her right neck, shoulder, arm, numbness to her right thumb.  Denies any hx of cervical disc disease, denies any heavy lifting, straining.      Past Medical History:   Diagnosis Date    Breast cancer     CAD (coronary artery disease)     s/p stent 2016    Chronic constipation     GERD (gastroesophageal reflux disease)     History of breast cancer     bilateral breast, XRT both times, no ctx    History of CVA (cerebrovascular accident)     Hyperlipidemia associated with type 2 diabetes mellitus     Hypertension associated with diabetes     Major depression, recurrent     Parkinson disease     Type II diabetes mellitus with complication        Past Surgical History:   Procedure Laterality Date    BREAST LUMPECTOMY         Lab Results   Component Value Date    WBC 5.78 02/03/2017    HGB 14.4 02/03/2017    HCT 43.4 02/03/2017     02/03/2017    CHOL 135 05/04/2017    TRIG 114 05/04/2017    HDL 46 05/04/2017    ALT 23 05/04/2017    AST 19 05/04/2017     05/04/2017    K 4.9 05/04/2017     05/04/2017    CREATININE 1.0 05/04/2017    BUN 22 05/04/2017    CO2 29 05/04/2017    TSH 2.695 02/03/2017    HGBA1C 8.1 (H) 05/04/2017       /78 (BP Location: Right arm, Patient Position: Sitting, BP Method: Manual)   Pulse 88   Temp 97.6 °F (36.4 °C) (Tympanic)   Ht 5' 3" (1.6 m)   Wt 89.8 kg (197 lb 15.6 oz)   SpO2 95%   BMI 35.07 kg/m²       Review of Systems   Constitutional: Negative for appetite change, fatigue and unexpected weight change.   HENT: Negative for congestion, ear pain, postnasal drip, rhinorrhea, sinus pressure, sneezing, sore throat, tinnitus, trouble swallowing and voice change.    Eyes: Negative for pain, discharge, redness, itching and visual disturbance. "   Respiratory: Negative for cough, chest tightness, shortness of breath and wheezing.    Cardiovascular: Negative for chest pain, palpitations and leg swelling.   Gastrointestinal: Negative for abdominal distention, abdominal pain, blood in stool, constipation, diarrhea, nausea and vomiting.        No reflux.   Genitourinary: Negative for difficulty urinating, dyspareunia, flank pain, menstrual problem and pelvic pain.   Musculoskeletal: Positive for arthralgias, myalgias and neck pain. Negative for back pain and neck stiffness.   Skin: Negative for color change and rash.   Neurological: Negative for dizziness and headaches.   Psychiatric/Behavioral: Negative for confusion and sleep disturbance. The patient is not nervous/anxious.       Objective:     Physical Exam   Constitutional: She is oriented to person, place, and time. She appears well-developed and well-nourished. No distress.   Musculoskeletal:   Normal gait  Has FROM of neck, mild right sided pain, has FROM of shoulders, right arm, able to squeeze hand and supinate right arm, all with minimal pain, no cervical spinal TTP   Neurological: She is alert and oriented to person, place, and time.   Skin: Skin is warm and dry.   Psychiatric: She has a normal mood and affect. Her behavior is normal.     Assessment:      1. Neck pain on right side      Plan:   Neck pain on right side  -     X-Ray Cervical Spine AP And Lateral; Future; Expected date: 08/01/2017    Other orders  -     cyclobenzaprine (FLEXERIL) 10 MG tablet; Take 1 tablet (10 mg total) by mouth 3 (three) times daily as needed.  Dispense: 60 tablet; Refill: 0    will not take her xanax at night when she takes her flexeril, she uses a cane.  Will use her Ibuprofen with food, 600 mg bid.  Will call the report to her.  She has a PT that goes to her house for gait training, will ask to work on her right neck/shoulder area      Current Outpatient Prescriptions:     alprazolam (XANAX) 0.25 MG tablet, Take 1  tablet (0.25 mg total) by mouth 3 (three) times daily as needed., Disp: 60 tablet, Rfl: 5    amlodipine (NORVASC) 2.5 MG tablet, Take 2.5 mg by mouth once daily., Disp: , Rfl: 5    aspirin (ECOTRIN) 81 MG EC tablet, Take 81 mg by mouth., Disp: , Rfl:     atorvastatin (LIPITOR) 80 MG tablet, Take 1 tablet (80 mg total) by mouth once daily., Disp: 90 tablet, Rfl: 3    blood sugar diagnostic Strp, Check blood sugar (4) times daily, Disp: , Rfl:     blood-glucose meter (CLEVER CHOICE BLOOD GLUC SYS) [facility], , Disp: , Rfl:     buPROPion (WELLBUTRIN XL) 300 MG 24 hr tablet, Take 1 tablet (300 mg total) by mouth once daily., Disp: 90 tablet, Rfl: 3    carbidopa-levodopa  mg (SINEMET)  mg per tablet, Take 2 tablets by mouth 4 (four) times daily. , Disp: , Rfl:     glimepiride (AMARYL) 4 MG tablet, Take 1 tablet (4 mg total) by mouth 2 (two) times daily before meals., Disp: 180 tablet, Rfl: 3    insulin syringes, disposable, 1 mL Syrg, Inject into the skin., Disp: , Rfl:     lancets 28 gauge Misc, Inject 1 applicator into the skin., Disp: , Rfl:     lancing device (LANCING DEVICE WITH LANCETS) Misc, , Disp: , Rfl:     LANTUS SOLOSTAR 100 unit/mL (3 mL) InPn pen, Inject 42 Units into the skin every evening., Disp: 3 Box, Rfl: 3    metformin (GLUCOPHAGE) 500 MG tablet, Take 1 tablet (500 mg total) by mouth 2 (two) times daily with meals., Disp: 180 tablet, Rfl: 3    miconazole (MICOTIN) 2 % cream, Apply topically 2 (two) times daily., Disp: 57 g, Rfl: 3    nystatin (MYCOSTATIN) powder, Apply topically 2 (two) times daily., Disp: 60 g, Rfl: 6    pantoprazole (PROTONIX) 40 MG tablet, Take 40 mg by mouth once daily. , Disp: , Rfl:     AMITIZA 24 mcg Cap, TAKE 1 CAPSULE BY MOUTH 2 (TWO) TIMES DAILY WITH MEALS FOR 30 DAYS., Disp: 180 capsule, Rfl: 3    cyclobenzaprine (FLEXERIL) 10 MG tablet, Take 1 tablet (10 mg total) by mouth 3 (three) times daily as needed., Disp: 60 tablet, Rfl: 0    gabapentin  (NEURONTIN) 100 MG capsule, Take 100 mg by mouth nightly., Disp: , Rfl: 1    lisinopril (PRINIVIL,ZESTRIL) 40 MG tablet, Take 1 tablet by mouth once daily., Disp: , Rfl:

## 2017-08-01 NOTE — TELEPHONE ENCOUNTER
----- Message from India Mayorga sent at 8/1/2017 11:22 AM CDT -----  Pt at [phone]//states she is returning your call//please call again//thanks/lh

## 2017-08-01 NOTE — TELEPHONE ENCOUNTER
Notified of the xray results.   Will try conservative measures first.  Consider MRI if not improved

## 2017-08-03 ENCOUNTER — LAB VISIT (OUTPATIENT)
Dept: LAB | Facility: HOSPITAL | Age: 67
End: 2017-08-03
Attending: INTERNAL MEDICINE
Payer: MEDICARE

## 2017-08-03 DIAGNOSIS — Z79.4 TYPE 2 DIABETES MELLITUS WITH COMPLICATION, WITH LONG-TERM CURRENT USE OF INSULIN: ICD-10-CM

## 2017-08-03 DIAGNOSIS — E11.8 TYPE 2 DIABETES MELLITUS WITH COMPLICATION, WITH LONG-TERM CURRENT USE OF INSULIN: ICD-10-CM

## 2017-08-03 LAB
ANION GAP SERPL CALC-SCNC: 9 MMOL/L
BUN SERPL-MCNC: 20 MG/DL
CALCIUM SERPL-MCNC: 9.7 MG/DL
CHLORIDE SERPL-SCNC: 106 MMOL/L
CO2 SERPL-SCNC: 30 MMOL/L
CREAT SERPL-MCNC: 1 MG/DL
EST. GFR  (AFRICAN AMERICAN): >60 ML/MIN/1.73 M^2
EST. GFR  (NON AFRICAN AMERICAN): 57.6 ML/MIN/1.73 M^2
GLUCOSE SERPL-MCNC: 116 MG/DL
POTASSIUM SERPL-SCNC: 5 MMOL/L
SODIUM SERPL-SCNC: 145 MMOL/L

## 2017-08-03 PROCEDURE — 83036 HEMOGLOBIN GLYCOSYLATED A1C: CPT

## 2017-08-03 PROCEDURE — 36415 COLL VENOUS BLD VENIPUNCTURE: CPT | Mod: PO

## 2017-08-03 PROCEDURE — 80048 BASIC METABOLIC PNL TOTAL CA: CPT

## 2017-08-04 LAB
ESTIMATED AVG GLUCOSE: 203 MG/DL
HBA1C MFR BLD HPLC: 8.7 %

## 2017-08-10 ENCOUNTER — OFFICE VISIT (OUTPATIENT)
Dept: INTERNAL MEDICINE | Facility: CLINIC | Age: 67
End: 2017-08-10
Payer: MEDICARE

## 2017-08-10 VITALS
DIASTOLIC BLOOD PRESSURE: 80 MMHG | HEART RATE: 84 BPM | TEMPERATURE: 97 F | WEIGHT: 192.25 LBS | HEIGHT: 63 IN | SYSTOLIC BLOOD PRESSURE: 130 MMHG | BODY MASS INDEX: 34.06 KG/M2 | OXYGEN SATURATION: 96 %

## 2017-08-10 DIAGNOSIS — Z79.4 TYPE 2 DIABETES MELLITUS WITH COMPLICATION, WITH LONG-TERM CURRENT USE OF INSULIN: Primary | ICD-10-CM

## 2017-08-10 DIAGNOSIS — E11.59 HYPERTENSION ASSOCIATED WITH DIABETES: ICD-10-CM

## 2017-08-10 DIAGNOSIS — E11.69 HYPERLIPIDEMIA ASSOCIATED WITH TYPE 2 DIABETES MELLITUS: ICD-10-CM

## 2017-08-10 DIAGNOSIS — F33.1 MODERATE EPISODE OF RECURRENT MAJOR DEPRESSIVE DISORDER: ICD-10-CM

## 2017-08-10 DIAGNOSIS — E11.8 TYPE 2 DIABETES MELLITUS WITH COMPLICATION, WITH LONG-TERM CURRENT USE OF INSULIN: Primary | ICD-10-CM

## 2017-08-10 DIAGNOSIS — I25.10 CORONARY ARTERY DISEASE, ANGINA PRESENCE UNSPECIFIED, UNSPECIFIED VESSEL OR LESION TYPE, UNSPECIFIED WHETHER NATIVE OR TRANSPLANTED HEART: ICD-10-CM

## 2017-08-10 DIAGNOSIS — I15.2 HYPERTENSION ASSOCIATED WITH DIABETES: ICD-10-CM

## 2017-08-10 DIAGNOSIS — G20.A1 PARKINSON DISEASE: ICD-10-CM

## 2017-08-10 DIAGNOSIS — E78.5 HYPERLIPIDEMIA ASSOCIATED WITH TYPE 2 DIABETES MELLITUS: ICD-10-CM

## 2017-08-10 PROCEDURE — 1159F MED LIST DOCD IN RCRD: CPT | Mod: ,,, | Performed by: INTERNAL MEDICINE

## 2017-08-10 PROCEDURE — 3079F DIAST BP 80-89 MM HG: CPT | Mod: ,,, | Performed by: INTERNAL MEDICINE

## 2017-08-10 PROCEDURE — 99999 PR PBB SHADOW E&M-EST. PATIENT-LVL III: CPT | Mod: PBBFAC,,, | Performed by: INTERNAL MEDICINE

## 2017-08-10 PROCEDURE — 4010F ACE/ARB THERAPY RXD/TAKEN: CPT | Mod: ,,, | Performed by: INTERNAL MEDICINE

## 2017-08-10 PROCEDURE — 3045F PR MOST RECENT HEMOGLOBIN A1C LEVEL 7.0-9.0%: CPT | Mod: ,,, | Performed by: INTERNAL MEDICINE

## 2017-08-10 PROCEDURE — 3075F SYST BP GE 130 - 139MM HG: CPT | Mod: ,,, | Performed by: INTERNAL MEDICINE

## 2017-08-10 PROCEDURE — 99213 OFFICE O/P EST LOW 20 MIN: CPT | Mod: PBBFAC,PO | Performed by: INTERNAL MEDICINE

## 2017-08-10 PROCEDURE — 3008F BODY MASS INDEX DOCD: CPT | Mod: ,,, | Performed by: INTERNAL MEDICINE

## 2017-08-10 PROCEDURE — 1125F AMNT PAIN NOTED PAIN PRSNT: CPT | Mod: ,,, | Performed by: INTERNAL MEDICINE

## 2017-08-10 PROCEDURE — 99214 OFFICE O/P EST MOD 30 MIN: CPT | Mod: S$PBB,,, | Performed by: INTERNAL MEDICINE

## 2017-08-10 RX ORDER — GLIMEPIRIDE 4 MG/1
4 TABLET ORAL
Qty: 90 TABLET | Refills: 3 | Status: SHIPPED | OUTPATIENT
Start: 2017-08-10 | End: 2018-09-11 | Stop reason: SDUPTHER

## 2017-08-10 RX ORDER — INSULIN GLARGINE 100 [IU]/ML
30 INJECTION, SOLUTION SUBCUTANEOUS NIGHTLY
Qty: 3 BOX | Refills: 3
Start: 2017-08-10 | End: 2018-01-02 | Stop reason: SDUPTHER

## 2017-08-10 NOTE — PROGRESS NOTES
"HPI:  Patient is a 67-year-old female who comes in today for follow-up of diabetes, hypertension, lipids.  She states her fasting blood sugar in the mornings typically between .  She denies any hypoglycemic events.  Her blood pressures typically 130/80 at home.  She checks it almost every day    Current meds have been verified and updated per the EMR  Exam:/80   Pulse 84   Temp 97.1 °F (36.2 °C) (Tympanic)   Ht 5' 3" (1.6 m)   Wt 87.2 kg (192 lb 3.9 oz)   SpO2 96%   BMI 34.05 kg/m²   Chest clear  Cardiovascular regular rate and rhythm without murmur, gallop or rub  Extremities without edema    Lab Results   Component Value Date    WBC 5.78 02/03/2017    HGB 14.4 02/03/2017    HCT 43.4 02/03/2017     02/03/2017    CHOL 135 05/04/2017    TRIG 114 05/04/2017    HDL 46 05/04/2017    ALT 23 05/04/2017    AST 19 05/04/2017     08/03/2017    K 5.0 08/03/2017     08/03/2017    CREATININE 1.0 08/03/2017    BUN 20 08/03/2017    CO2 30 (H) 08/03/2017    TSH 2.695 02/03/2017    HGBA1C 8.7 (H) 08/03/2017       Impression:  Diabetes, not to goal.  Given her fasting sugars are very well-controlled, she really is having a problem with postprandial blood sugars.  Hypertension, controlled by home blood pressure readings  Multiple other medical problems below, stable  Patient Active Problem List   Diagnosis    Type II diabetes mellitus with complication    Hypertension associated with diabetes    Hyperlipidemia associated with type 2 diabetes mellitus    Parkinson disease    CAD (coronary artery disease)    GERD (gastroesophageal reflux disease)    Chronic constipation    History of breast cancer    History of CVA (cerebrovascular accident)    Major depression, recurrent       Plan:  Orders Placed This Encounter    Hemoglobin A1c    Basic metabolic panel    Lipid panel    TSH    glimepiride (AMARYL) 4 MG tablet    dulaglutide (TRULICITY) 0.75 mg/0.5 mL PnIj    LANTUS SOLOSTAR 100 " unit/mL (3 mL) InPn pen     She'll be started on Trulicity once a week.  She will decrease the Lantus to 30 units and decrease the Amaryl to once a day.  She will see me in 3 months with the above lab work.

## 2017-08-11 ENCOUNTER — TELEPHONE (OUTPATIENT)
Dept: INTERNAL MEDICINE | Facility: CLINIC | Age: 67
End: 2017-08-11

## 2017-08-11 NOTE — TELEPHONE ENCOUNTER
----- Message from Viki Martino sent at 8/11/2017 12:40 PM CDT -----  Contact: pt  Pt is calling Nurse staff regarding the new RX that was given to patient with side affects. patient is taking another medication that will cause side affects. [facility] would not fill the RX because there would be a conflict in medications. Pt will need another authorization for the New Rx . Pt call back [phone] thanks     [facility]/pharmacy #[id number]  [place], [place] - [address] [facility]  [address] [address] [address]  Phone: [phone] Fax: [phone]

## 2017-08-11 NOTE — TELEPHONE ENCOUNTER
"Returned call to pt. Pt stated, " the pharmacy will not fill the Trulicity . He said in interacts with another medication I take".   I called pharmacy twice. Pt needs a PA for Trulicity. /TGD  "

## 2017-08-14 RX ORDER — LUBIPROSTONE 24 UG/1
CAPSULE, GELATIN COATED ORAL
Qty: 180 CAPSULE | Refills: 3 | Status: SHIPPED | OUTPATIENT
Start: 2017-08-14 | End: 2017-12-26 | Stop reason: SDUPTHER

## 2017-08-14 RX ORDER — PANTOPRAZOLE SODIUM 40 MG/1
40 TABLET, DELAYED RELEASE ORAL DAILY
Qty: 90 TABLET | Refills: 3 | Status: SHIPPED | OUTPATIENT
Start: 2017-08-14 | End: 2018-02-26 | Stop reason: SDUPTHER

## 2017-08-16 ENCOUNTER — PATIENT MESSAGE (OUTPATIENT)
Dept: INTERNAL MEDICINE | Facility: CLINIC | Age: 67
End: 2017-08-16

## 2017-08-16 ENCOUNTER — PATIENT MESSAGE (OUTPATIENT)
Dept: INFUSION THERAPY | Facility: HOSPITAL | Age: 67
End: 2017-08-16

## 2017-08-22 ENCOUNTER — TELEPHONE (OUTPATIENT)
Dept: INTERNAL MEDICINE | Facility: CLINIC | Age: 67
End: 2017-08-22

## 2017-08-22 NOTE — TELEPHONE ENCOUNTER
Patient contacted clinic to clarify that Dr. Sauceda did in fact send the prescription for Victoza to the pharmacy. She wanted to make him aware that she is on Lantus also. Patient educated on times that she should take insulin.Patient also informed that this information will be sent to Dr. Sauceda. Patient verbalized understating of times of when to take insulin.

## 2017-08-22 NOTE — TELEPHONE ENCOUNTER
Patient contacted and informed that Dr. Sauceda is aware of her being on Victoza and Lantus and per Dr. Sauceda she is to take the Lantus at bedtime and the Victoza in the morning. Patient restated and verbalized a complete understanding.

## 2017-08-22 NOTE — TELEPHONE ENCOUNTER
----- Message from Scott Harding sent at 8/22/2017  8:03 AM CDT -----  Contact: pt  She's calling in regards to her RX medication (insulin), please advise, [phone] (home)

## 2017-08-24 ENCOUNTER — TELEPHONE (OUTPATIENT)
Dept: INTERNAL MEDICINE | Facility: CLINIC | Age: 67
End: 2017-08-24

## 2017-09-20 ENCOUNTER — NURSE TRIAGE (OUTPATIENT)
Dept: ADMINISTRATIVE | Facility: CLINIC | Age: 67
End: 2017-09-20

## 2017-09-20 NOTE — TELEPHONE ENCOUNTER
Reason for Disposition   Blood glucose  mg/dl (3.5 -13 mmol/l)    Protocols used:  DIABETES - HIGH BLOOD SUGAR-A-OH  Ms. Mayorga states her blood sugar is 215. Patient states she did eat baked beans last night.

## 2017-10-13 ENCOUNTER — HOSPITAL ENCOUNTER (OUTPATIENT)
Dept: RADIOLOGY | Facility: HOSPITAL | Age: 67
Discharge: HOME | End: 2017-10-13
Attending: FAMILY MEDICINE
Payer: MEDICARE

## 2017-10-13 ENCOUNTER — OFFICE VISIT (OUTPATIENT)
Dept: INTERNAL MEDICINE | Facility: CLINIC | Age: 67
End: 2017-10-13
Payer: MEDICARE

## 2017-10-13 VITALS
HEIGHT: 63 IN | WEIGHT: 177.94 LBS | OXYGEN SATURATION: 95 % | HEART RATE: 94 BPM | TEMPERATURE: 98 F | DIASTOLIC BLOOD PRESSURE: 74 MMHG | SYSTOLIC BLOOD PRESSURE: 138 MMHG | BODY MASS INDEX: 31.53 KG/M2

## 2017-10-13 DIAGNOSIS — M19.90 OSTEOARTHRITIS, UNSPECIFIED OSTEOARTHRITIS TYPE, UNSPECIFIED SITE: ICD-10-CM

## 2017-10-13 DIAGNOSIS — M25.551 RIGHT HIP PAIN: ICD-10-CM

## 2017-10-13 DIAGNOSIS — M25.551 RIGHT HIP PAIN: Primary | ICD-10-CM

## 2017-10-13 PROCEDURE — G0008 ADMIN INFLUENZA VIRUS VAC: HCPCS | Mod: PBBFAC,PO

## 2017-10-13 PROCEDURE — 99213 OFFICE O/P EST LOW 20 MIN: CPT | Mod: PBBFAC,25,PO | Performed by: FAMILY MEDICINE

## 2017-10-13 PROCEDURE — 99213 OFFICE O/P EST LOW 20 MIN: CPT | Mod: S$PBB,,, | Performed by: FAMILY MEDICINE

## 2017-10-13 PROCEDURE — 96372 THER/PROPH/DIAG INJ SC/IM: CPT | Mod: PBBFAC,59,PO

## 2017-10-13 PROCEDURE — 99999 PR PBB SHADOW E&M-EST. PATIENT-LVL III: CPT | Mod: PBBFAC,,, | Performed by: FAMILY MEDICINE

## 2017-10-13 PROCEDURE — 73502 X-RAY EXAM HIP UNI 2-3 VIEWS: CPT | Mod: 26,RT,, | Performed by: RADIOLOGY

## 2017-10-13 PROCEDURE — 73502 X-RAY EXAM HIP UNI 2-3 VIEWS: CPT | Mod: TC,PO,RT

## 2017-10-13 RX ORDER — KETOROLAC TROMETHAMINE 30 MG/ML
15 INJECTION, SOLUTION INTRAMUSCULAR; INTRAVENOUS
Status: DISCONTINUED | OUTPATIENT
Start: 2017-10-13 | End: 2017-10-13

## 2017-10-13 RX ORDER — NAPROXEN SODIUM 550 MG/1
550 TABLET ORAL 2 TIMES DAILY WITH MEALS
Qty: 28 TABLET | Refills: 0 | Status: SHIPPED | OUTPATIENT
Start: 2017-10-13 | End: 2017-11-15

## 2017-10-13 RX ORDER — KETOROLAC TROMETHAMINE 30 MG/ML
30 INJECTION, SOLUTION INTRAMUSCULAR; INTRAVENOUS
Status: COMPLETED | OUTPATIENT
Start: 2017-10-13 | End: 2017-10-13

## 2017-10-13 RX ORDER — PREDNISONE 20 MG/1
40 TABLET ORAL DAILY
Qty: 8 TABLET | Refills: 0 | Status: SHIPPED | OUTPATIENT
Start: 2017-10-13 | End: 2017-10-17

## 2017-10-13 RX ADMIN — KETOROLAC TROMETHAMINE 30 MG: 30 INJECTION, SOLUTION INTRAMUSCULAR; INTRAVENOUS at 09:10

## 2017-10-13 NOTE — PROGRESS NOTES
Ketorolac Tromethamine 30 mg/ml IM right ventrogluteal.  Lot# -[id number] exp 03/01/2018 [facility].  Pt advised to wait in clinic 15 minutes to monitor for side effects.  Pt voiced understanding and tolerated injection well.

## 2017-10-13 NOTE — PROGRESS NOTES
Subjective:       Patient ID: Mari Mayorga is a 67 y.o. female.    Chief Complaint: Hip Pain (right hip)      Patient complaining of pain in right hip for past month-6 weeks. Has been taking advil which helps a bit temporarily but pain soon returns. No known injury or trauma to area. No history of previous similar pain. Patient describes as aching and 6/10 in severity.      Hip Pain        Review of Systems   Constitutional: Positive for activity change. Negative for appetite change and fever.   Musculoskeletal: Positive for arthralgias and gait problem. Negative for back pain and joint swelling.   Skin: Negative for color change and rash.   Hematological: Negative for adenopathy. Does not bruise/bleed easily.     Past Medical History:   Diagnosis Date    Breast cancer     CAD (coronary artery disease)     s/p stent 2016    Chronic constipation     GERD (gastroesophageal reflux disease)     History of breast cancer     bilateral breast, XRT both times, no ctx    History of CVA (cerebrovascular accident)     Hyperlipidemia associated with type 2 diabetes mellitus     Hypertension associated with diabetes     Major depression, recurrent     Parkinson disease     Type II diabetes mellitus with complication      Past Surgical History:   Procedure Laterality Date    BREAST LUMPECTOMY       Family History   Problem Relation Age of Onset    Diabetes Mother      Social History     Social History    Marital status:      Spouse name: N/A    Number of children: N/A    Years of education: N/A     Occupational History    Not on file.     Social History Main Topics    Smoking status: Former Smoker    Smokeless tobacco: Never Used    Alcohol use No    Drug use: No    Sexual activity: No     Other Topics Concern    Not on file     Social History Narrative    No narrative on file     Review of patient's allergies indicates:   Allergen Reactions    Morphine Hives       Objective:       BP  "138/74 (BP Location: Right arm, Patient Position: Sitting, BP Method: Medium (Manual))   Pulse 94   Temp 97.8 °F (36.6 °C) (Tympanic)   Ht 5' 3" (1.6 m)   Wt 80.7 kg (177 lb 14.6 oz)   SpO2 95%   BMI 31.52 kg/m²   Physical Exam   Constitutional: She appears well-developed and well-nourished. No distress.   Cardiovascular: Normal rate, regular rhythm and normal heart sounds.    Pulmonary/Chest: Effort normal and breath sounds normal. No respiratory distress.   Musculoskeletal: Normal range of motion. She exhibits tenderness. She exhibits no edema or deformity.   Skin: She is not diaphoretic.   Nursing note and vitals reviewed.    Assessment:     1. Right hip pain    2. Osteoarthritis, unspecified osteoarthritis type, unspecified site      Plan:   Right hip pain  -     X-Ray Hip 2 View Right; Future; Expected date: 10/13/2017  -     ketorolac injection 30 mg; Inject 30 mg into the muscle one time.    Osteoarthritis, unspecified osteoarthritis type, unspecified site  -     ketorolac injection 30 mg; Inject 30 mg into the muscle one time.    Other orders  -     Influenza - High Dose (65+) (PF) (IM)  -     Discontinue: ketorolac injection 15 mg; Inject 15 mg into the vein one time.  -     predniSONE (DELTASONE) 20 MG tablet; Take 2 tablets (40 mg total) by mouth once daily.  Dispense: 8 tablet; Refill: 0  -     naproxen sodium (ANAPROX) 550 MG tablet; Take 1 tablet (550 mg total) by mouth 2 (two) times daily with meals.  Dispense: 28 tablet; Refill: 0      Medication List with Changes/Refills   New Medications    NAPROXEN SODIUM (ANAPROX) 550 MG TABLET    Take 1 tablet (550 mg total) by mouth 2 (two) times daily with meals.    PREDNISONE (DELTASONE) 20 MG TABLET    Take 2 tablets (40 mg total) by mouth once daily.   Current Medications    ALPRAZOLAM (XANAX) 0.25 MG TABLET    Take 1 tablet (0.25 mg total) by mouth 3 (three) times daily as needed.    AMITIZA 24 MCG CAP    TAKE 1 CAPSULE BY MOUTH 2 (TWO) TIMES DAILY " WITH MEALS FOR 30 DAYS.    AMLODIPINE (NORVASC) 2.5 MG TABLET    Take 2.5 mg by mouth once daily.    ASPIRIN (ECOTRIN) 81 MG EC TABLET    Take 81 mg by mouth.    ATORVASTATIN (LIPITOR) 80 MG TABLET    Take 1 tablet (80 mg total) by mouth once daily.    BLOOD SUGAR DIAGNOSTIC STRP    Check blood sugar (4) times daily    BLOOD-GLUCOSE METER (CLEVER CHOICE BLOOD GLUC SYS) MISC        BUPROPION (WELLBUTRIN XL) 300 MG 24 HR TABLET    Take 1 tablet (300 mg total) by mouth once daily.    CARBIDOPA-LEVODOPA  MG (SINEMET)  MG PER TABLET    Take 2 tablets by mouth 4 (four) times daily.     GABAPENTIN (NEURONTIN) 100 MG CAPSULE    Take 100 mg by mouth nightly.    GLIMEPIRIDE (AMARYL) 4 MG TABLET    Take 1 tablet (4 mg total) by mouth daily with breakfast.    INSULIN SYRINGES, DISPOSABLE, 1 ML SYRG    Inject into the skin.    LANCETS 28 GAUGE MISC    Inject 1 applicator into the skin.    LANCING DEVICE (LANCING DEVICE WITH LANCETS[place] [facility]        LANTUS SOLOSTAR 100 UNIT/ML (3 ML) INPN PEN    Inject 30 Units into the skin every evening.    LIRAGLUTIDE 0.6 MG/0.1 ML, 18 MG/3 ML, SUBQ PNIJ 0.6 MG/0.1 ML (18 MG/3 ML) PNIJ    Inject 0.6 mg into the skin once daily.    LISINOPRIL (PRINIVIL,ZESTRIL) 40 MG TABLET    Take 1 tablet by mouth once daily.    METFORMIN (GLUCOPHAGE) 500 MG TABLET    Take 1 tablet (500 mg total) by mouth 2 (two) times daily with meals.    MICONAZOLE (MICOTIN) 2 % CREAM    Apply topically 2 (two) times daily.    NYSTATIN (MYCOSTATIN) POWDER    Apply topically 2 (two) times daily.    PANTOPRAZOLE (PROTONIX) 40 MG TABLET    Take 1 tablet (40 mg total) by mouth once daily.

## 2017-11-01 ENCOUNTER — PATIENT OUTREACH (OUTPATIENT)
Dept: ADMINISTRATIVE | Facility: HOSPITAL | Age: 67
End: 2017-11-01

## 2017-11-01 NOTE — PROGRESS NOTES
Discussed eye exam. States last completed at [facility]. She said she can not afford to go right now but will schedule when she can. She also reported having a colonoscopy done in the past but not real sure where. Will see if [facility] completed.

## 2017-11-01 NOTE — LETTER
November 1, 2017    [facility]               [facility]  [address]  [facility] [address]  Phone: [phone] November 1, 2017     Patient: Mari Mayorga    YOB: 1950   Date of Visit: 11/1/2017            We are seeing Mari HooperYUNG OvalleO.B is 1950, at [facility]. Rob Sauceda MD is their primary care physician. To help with our [place] maintenance records could you please send the following:     Most recent Colonoscopy Report  Please send fax to [phone].    Thank-you in advance for your assistance. If you have any questions or concerns, please don't hesitate to contact me at [phone].     Sincerely,  Cherelle LAM LPN Care Coordination   [facility]   Phone [phone] ext [phone],  Fax [phone]

## 2017-11-08 ENCOUNTER — LAB VISIT (OUTPATIENT)
Dept: LAB | Facility: HOSPITAL | Age: 67
End: 2017-11-08
Attending: INTERNAL MEDICINE
Payer: MEDICARE

## 2017-11-08 DIAGNOSIS — E11.8 TYPE 2 DIABETES MELLITUS WITH COMPLICATION, WITH LONG-TERM CURRENT USE OF INSULIN: ICD-10-CM

## 2017-11-08 DIAGNOSIS — Z79.4 TYPE 2 DIABETES MELLITUS WITH COMPLICATION, WITH LONG-TERM CURRENT USE OF INSULIN: ICD-10-CM

## 2017-11-08 LAB
ANION GAP SERPL CALC-SCNC: 7 MMOL/L
BUN SERPL-MCNC: 9 MG/DL
CALCIUM SERPL-MCNC: 9.6 MG/DL
CHLORIDE SERPL-SCNC: 107 MMOL/L
CHOLEST SERPL-MCNC: 117 MG/DL
CHOLEST/HDLC SERPL: 2.3 {RATIO}
CO2 SERPL-SCNC: 30 MMOL/L
CREAT SERPL-MCNC: 0.9 MG/DL
EST. GFR  (AFRICAN AMERICAN): >60 ML/MIN/1.73 M^2
EST. GFR  (NON AFRICAN AMERICAN): >60 ML/MIN/1.73 M^2
ESTIMATED AVG GLUCOSE: 186 MG/DL
GLUCOSE SERPL-MCNC: 185 MG/DL
HBA1C MFR BLD HPLC: 8.1 %
HDLC SERPL-MCNC: 52 MG/DL
HDLC SERPL: 44.4 %
LDLC SERPL CALC-MCNC: 45.4 MG/DL
NONHDLC SERPL-MCNC: 65 MG/DL
POTASSIUM SERPL-SCNC: 4.6 MMOL/L
SODIUM SERPL-SCNC: 144 MMOL/L
TRIGL SERPL-MCNC: 98 MG/DL
TSH SERPL DL<=0.005 MIU/L-ACNC: 3.22 UIU/ML

## 2017-11-08 PROCEDURE — 83036 HEMOGLOBIN GLYCOSYLATED A1C: CPT

## 2017-11-08 PROCEDURE — 36415 COLL VENOUS BLD VENIPUNCTURE: CPT | Mod: PO

## 2017-11-08 PROCEDURE — 80061 LIPID PANEL: CPT

## 2017-11-08 PROCEDURE — 84443 ASSAY THYROID STIM HORMONE: CPT

## 2017-11-08 PROCEDURE — 80048 BASIC METABOLIC PNL TOTAL CA: CPT

## 2017-11-15 ENCOUNTER — OFFICE VISIT (OUTPATIENT)
Dept: INTERNAL MEDICINE | Facility: CLINIC | Age: 67
End: 2017-11-15
Payer: MEDICARE

## 2017-11-15 VITALS
TEMPERATURE: 97 F | WEIGHT: 177.69 LBS | BODY MASS INDEX: 31.48 KG/M2 | DIASTOLIC BLOOD PRESSURE: 72 MMHG | HEIGHT: 63 IN | SYSTOLIC BLOOD PRESSURE: 132 MMHG | HEART RATE: 95 BPM | OXYGEN SATURATION: 98 %

## 2017-11-15 DIAGNOSIS — Z12.39 SCREENING BREAST EXAMINATION: ICD-10-CM

## 2017-11-15 DIAGNOSIS — E78.5 HYPERLIPIDEMIA ASSOCIATED WITH TYPE 2 DIABETES MELLITUS: ICD-10-CM

## 2017-11-15 DIAGNOSIS — E11.69 HYPERLIPIDEMIA ASSOCIATED WITH TYPE 2 DIABETES MELLITUS: ICD-10-CM

## 2017-11-15 DIAGNOSIS — M81.0 POSTMENOPAUSAL BONE LOSS: ICD-10-CM

## 2017-11-15 DIAGNOSIS — E11.59 HYPERTENSION ASSOCIATED WITH DIABETES: ICD-10-CM

## 2017-11-15 DIAGNOSIS — E11.8 TYPE 2 DIABETES MELLITUS WITH COMPLICATION, WITH LONG-TERM CURRENT USE OF INSULIN: Primary | ICD-10-CM

## 2017-11-15 DIAGNOSIS — F33.1 MODERATE EPISODE OF RECURRENT MAJOR DEPRESSIVE DISORDER: ICD-10-CM

## 2017-11-15 DIAGNOSIS — I25.10 CORONARY ARTERY DISEASE, ANGINA PRESENCE UNSPECIFIED, UNSPECIFIED VESSEL OR LESION TYPE, UNSPECIFIED WHETHER NATIVE OR TRANSPLANTED HEART: ICD-10-CM

## 2017-11-15 DIAGNOSIS — Z79.4 TYPE 2 DIABETES MELLITUS WITH COMPLICATION, WITH LONG-TERM CURRENT USE OF INSULIN: Primary | ICD-10-CM

## 2017-11-15 DIAGNOSIS — I15.2 HYPERTENSION ASSOCIATED WITH DIABETES: ICD-10-CM

## 2017-11-15 DIAGNOSIS — G20.A1 PARKINSON DISEASE: ICD-10-CM

## 2017-11-15 DIAGNOSIS — K21.9 GASTROESOPHAGEAL REFLUX DISEASE, ESOPHAGITIS PRESENCE NOT SPECIFIED: ICD-10-CM

## 2017-11-15 PROCEDURE — 99999 PR PBB SHADOW E&M-EST. PATIENT-LVL III: CPT | Mod: PBBFAC,,, | Performed by: INTERNAL MEDICINE

## 2017-11-15 PROCEDURE — 99214 OFFICE O/P EST MOD 30 MIN: CPT | Mod: S$PBB,,, | Performed by: INTERNAL MEDICINE

## 2017-11-15 PROCEDURE — 99213 OFFICE O/P EST LOW 20 MIN: CPT | Mod: PBBFAC,PO | Performed by: INTERNAL MEDICINE

## 2017-11-15 RX ORDER — ALPRAZOLAM 0.25 MG/1
0.25 TABLET ORAL 3 TIMES DAILY PRN
Qty: 90 TABLET | Refills: 1 | Status: SHIPPED | OUTPATIENT
Start: 2017-11-15 | End: 2018-05-25 | Stop reason: SDUPTHER

## 2017-11-15 NOTE — PROGRESS NOTES
"HPI:  Patient is 67-year-old female who comes in today for follow-up of her diabetes.  Since starting on the Victoza.  Her hemoglobin A1c has gone from 8.7  to now where it is 8.1.  She denies any hypoglycemic problems.  Otherwise, she is doing well and has no other complaints    Current meds have been verified and updated per the EMR  Exam:/72 (BP Location: Right arm)   Pulse 95   Temp 97.3 °F (36.3 °C) (Tympanic)   Ht 5' 3" (1.6 m)   Wt 80.6 kg (177 lb 11.1 oz)   SpO2 98%   BMI 31.48 kg/m²   .  Exam deferred    Lab Results   Component Value Date    WBC 5.78 02/03/2017    HGB 14.4 02/03/2017    HCT 43.4 02/03/2017     02/03/2017    CHOL 117 (L) 11/08/2017    TRIG 98 11/08/2017    HDL 52 11/08/2017    ALT 23 05/04/2017    AST 19 05/04/2017     11/08/2017    K 4.6 11/08/2017     11/08/2017    CREATININE 0.9 11/08/2017    BUN 9 11/08/2017    CO2 30 (H) 11/08/2017    TSH 3.221 11/08/2017    HGBA1C 8.1 (H) 11/08/2017       Impression:  Multiple medical problems below  Patient Active Problem List   Diagnosis    Type II diabetes mellitus with complication    Hypertension associated with diabetes    Hyperlipidemia associated with type 2 diabetes mellitus    Parkinson disease    CAD (coronary artery disease)    GERD (gastroesophageal reflux disease)    Chronic constipation    History of breast cancer    History of CVA (cerebrovascular accident)    Major depression, recurrent       Plan:  Orders Placed This Encounter    Mammo Digital Screening Bilat with CAD    DXA Bone Density Spine And Hip    Hemoglobin A1c    Comprehensive metabolic panel    TSH    CBC auto differential    liraglutide 0.6 mg/0.1 mL, 18 mg/3 mL, subq PNIJ (VICTOZA 2-MADDY) 0.6 mg/0.1 mL (18 mg/3 mL) PnIj    ALPRAZolam (XANAX) 0.25 MG tablet     She will increase to Victoza to 1.2 mg daily.  She will see me again in 3 months with the above lab work.  She'll have a mammogram and DEXA scan done.    "

## 2017-11-16 ENCOUNTER — TELEPHONE (OUTPATIENT)
Dept: INTERNAL MEDICINE | Facility: CLINIC | Age: 67
End: 2017-11-16

## 2017-11-16 ENCOUNTER — PATIENT MESSAGE (OUTPATIENT)
Dept: INTERNAL MEDICINE | Facility: CLINIC | Age: 67
End: 2017-11-16

## 2017-11-16 NOTE — TELEPHONE ENCOUNTER
----- Message from Michael Bailey sent at 11/16/2017  2:53 PM CST -----  Contact: self [phone]  Would like to consult with nurse regarding scheduling shingle injection.  Please call back at [phone].  Md Anastacia

## 2017-11-16 NOTE — TELEPHONE ENCOUNTER
----- Message from Shanti Garcia sent at 11/16/2017  1:26 PM CST -----  returned call..[phone] (home)

## 2017-11-16 NOTE — TELEPHONE ENCOUNTER
Called pt informed her to give her insurance company a call to check if the injection is covered if given in office.  She voiced understanding.

## 2017-11-16 NOTE — TELEPHONE ENCOUNTER
Called pt.  She reports that she is not able to contact her insurance company no answer to the contact number on her insurance card.  Informed pt to call again to be sure the injection is covered at the office due to the expense.  She voiced understanding.

## 2017-11-20 ENCOUNTER — OFFICE VISIT (OUTPATIENT)
Dept: INTERNAL MEDICINE | Facility: CLINIC | Age: 67
End: 2017-11-20
Payer: MEDICARE

## 2017-11-20 VITALS
WEIGHT: 177.25 LBS | OXYGEN SATURATION: 96 % | HEIGHT: 63 IN | BODY MASS INDEX: 31.41 KG/M2 | SYSTOLIC BLOOD PRESSURE: 144 MMHG | HEART RATE: 95 BPM | TEMPERATURE: 97 F | DIASTOLIC BLOOD PRESSURE: 88 MMHG

## 2017-11-20 DIAGNOSIS — E11.8 TYPE 2 DIABETES MELLITUS WITH COMPLICATION, WITH LONG-TERM CURRENT USE OF INSULIN: ICD-10-CM

## 2017-11-20 DIAGNOSIS — Z79.4 TYPE 2 DIABETES MELLITUS WITH COMPLICATION, WITH LONG-TERM CURRENT USE OF INSULIN: ICD-10-CM

## 2017-11-20 DIAGNOSIS — B37.9 CANDIDIASIS: Primary | ICD-10-CM

## 2017-11-20 PROCEDURE — 99999 PR PBB SHADOW E&M-EST. PATIENT-LVL III: CPT | Mod: PBBFAC,,, | Performed by: FAMILY MEDICINE

## 2017-11-20 PROCEDURE — 99213 OFFICE O/P EST LOW 20 MIN: CPT | Mod: PBBFAC,PO | Performed by: FAMILY MEDICINE

## 2017-11-20 PROCEDURE — 99213 OFFICE O/P EST LOW 20 MIN: CPT | Mod: S$PBB,,, | Performed by: FAMILY MEDICINE

## 2017-11-20 RX ORDER — TERBINAFINE HYDROCHLORIDE 250 MG/1
250 TABLET ORAL DAILY
Qty: 30 TABLET | Refills: 0 | Status: SHIPPED | OUTPATIENT
Start: 2017-11-20 | End: 2017-12-20

## 2017-11-20 NOTE — PROGRESS NOTES
"Subjective:       Patient ID: Mari Mayorga is a 67 y.o. female.    Chief Complaint: Recurrent Skin Infections      Patient complaining of painful and pruritic rash she has on abdomen under pannus, under breasts and in axillae. Has had these on and off for a while now. Using triamcinolone cream, nystatic powder and brittany's butt paste currently without any relief.      Review of Systems   Constitutional: Negative for activity change, appetite change, fatigue and fever.   Skin: Positive for color change, rash and wound.   Hematological: Negative for adenopathy.     Past Medical History:   Diagnosis Date    Breast cancer     CAD (coronary artery disease)     s/p stent 2016    Chronic constipation     GERD (gastroesophageal reflux disease)     History of breast cancer     bilateral breast, XRT both times, no ctx    History of CVA (cerebrovascular accident)     Hyperlipidemia associated with type 2 diabetes mellitus     Hypertension associated with diabetes     Major depression, recurrent     Parkinson disease     Type II diabetes mellitus with complication      Past Surgical History:   Procedure Laterality Date    BREAST LUMPECTOMY       Family History   Problem Relation Age of Onset    Diabetes Mother      Social History     Social History    Marital status:      Spouse name: N/A    Number of children: N/A    Years of education: N/A     Occupational History    Not on file.     Social History Main Topics    Smoking status: Former Smoker    Smokeless tobacco: Never Used    Alcohol use No    Drug use: No    Sexual activity: No     Other Topics Concern    Not on file     Social History Narrative    No narrative on file     Review of patient's allergies indicates:   Allergen Reactions    Morphine Hives       Objective:       BP (!) 144/88 (BP Location: Left arm)   Pulse 95   Temp 97 °F (36.1 °C) (Tympanic)   Ht 5' 3" (1.6 m)   Wt 80.4 kg (177 lb 4 oz)   SpO2 96%   BMI 31.40 " kg/m²   Physical Exam   Constitutional: She appears well-developed and well-nourished. No distress.   Cardiovascular: Normal rate, regular rhythm and normal heart sounds.    Pulmonary/Chest: Effort normal and breath sounds normal. No respiratory distress.   Skin: She is not diaphoretic.   Bright red patches with exudate and cracked skin  Under pannus, breasts and right arm.   Psychiatric: She has a normal mood and affect. Her behavior is normal.   Nursing note and vitals reviewed.    Assessment:     1. Candidiasis    2. Type 2 diabetes mellitus with complication, with long-term current use of insulin      Plan:   Candidiasis    Type 2 diabetes mellitus with complication, with long-term current use of insulin    Other orders  -     terbinafine HCl (LAMISIL) 250 mg tablet; Take 1 tablet (250 mg total) by mouth once daily.  Dispense: 30 tablet; Refill: 0      Medication List with Changes/Refills   New Medications    TERBINAFINE HCL (LAMISIL) 250 MG TABLET    Take 1 tablet (250 mg total) by mouth once daily.   Current Medications    ALPRAZOLAM (XANAX) 0.25 MG TABLET    Take 1 tablet (0.25 mg total) by mouth 3 (three) times daily as needed.    AMITIZA 24 MCG CAP    TAKE 1 CAPSULE BY MOUTH 2 (TWO) TIMES DAILY WITH MEALS FOR 30 DAYS.    AMLODIPINE (NORVASC) 2.5 MG TABLET    Take 2.5 mg by mouth once daily.    ASPIRIN (ECOTRIN) 81 MG EC TABLET    Take 81 mg by mouth.    ATORVASTATIN (LIPITOR) 80 MG TABLET    Take 1 tablet (80 mg total) by mouth once daily.    BLOOD SUGAR DIAGNOSTIC STRP    Check blood sugar (4) times daily    BLOOD-GLUCOSE METER (CLEVER CHOICE BLOOD GLUC SYS) MISC        BUPROPION (WELLBUTRIN XL) 300 MG 24 HR TABLET    Take 1 tablet (300 mg total) by mouth once daily.    CARBIDOPA-LEVODOPA  MG (SINEMET)  MG PER TABLET    Take 2 tablets by mouth 4 (four) times daily. Takes 2 1/2 pills by mouth four times daily    GLIMEPIRIDE (AMARYL) 4 MG TABLET    Take 1 tablet (4 mg total) by mouth daily with  breakfast.    INSULIN SYRINGES, DISPOSABLE, 1 ML SYRG    Inject into the skin.    LANCETS 28 GAUGE MISC    Inject 1 applicator into the skin.    LANCING DEVICE (LANCING DEVICE WITH LANCETS) MISC        LANTUS SOLOSTAR 100 UNIT/ML (3 ML) INPN PEN    Inject 30 Units into the skin every evening.    LIRAGLUTIDE 0.6 MG/0.1 ML, 18 MG/3 ML, SUBQ PNIJ (VICTOZA 2-MADDY) 0.6 MG/0.1 ML (18 MG/3 ML) PNIJ    Inject 1.2 mg into the skin once daily.    LISINOPRIL (PRINIVIL,ZESTRIL) 40 MG TABLET    Take 1 tablet by mouth once daily.    METFORMIN (GLUCOPHAGE) 500 MG TABLET    Take 1 tablet (500 mg total) by mouth 2 (two) times daily with meals.    MICONAZOLE (MICOTIN) 2 % CREAM    Apply topically 2 (two) times daily.    NYSTATIN (MYCOSTATIN) POWDER    Apply topically 2 (two) times daily.    PANTOPRAZOLE (PROTONIX) 40 MG TABLET    Take 1 tablet (40 mg total) by mouth once daily.

## 2017-12-18 RX ORDER — TERBINAFINE HYDROCHLORIDE 250 MG/1
250 TABLET ORAL DAILY
Qty: 30 TABLET | Refills: 0 | OUTPATIENT
Start: 2017-12-18 | End: 2018-01-17

## 2017-12-18 NOTE — TELEPHONE ENCOUNTER
----- Message from Alina Melchor sent at 12/18/2017  8:01 AM CST -----  Pt needs a refill on her terbinafinehcl 250 mg called into [facility] in [place]/please call [phone]

## 2017-12-18 NOTE — TELEPHONE ENCOUNTER
She should not be refilling the Lamisil.  She has finished a 30 day course.  If she still having problems, she needs to be seen.  Have her see Master

## 2017-12-20 ENCOUNTER — OFFICE VISIT (OUTPATIENT)
Dept: INTERNAL MEDICINE | Facility: CLINIC | Age: 67
End: 2017-12-20
Payer: MEDICARE

## 2017-12-20 VITALS
BODY MASS INDEX: 30.23 KG/M2 | HEART RATE: 88 BPM | TEMPERATURE: 97 F | SYSTOLIC BLOOD PRESSURE: 122 MMHG | OXYGEN SATURATION: 98 % | WEIGHT: 170.63 LBS | DIASTOLIC BLOOD PRESSURE: 76 MMHG | HEIGHT: 63 IN

## 2017-12-20 DIAGNOSIS — B37.89 CANDIDIASIS OF BREAST: Primary | ICD-10-CM

## 2017-12-20 PROCEDURE — 99999 PR PBB SHADOW E&M-EST. PATIENT-LVL III: CPT | Mod: PBBFAC,,, | Performed by: INTERNAL MEDICINE

## 2017-12-20 PROCEDURE — 99213 OFFICE O/P EST LOW 20 MIN: CPT | Mod: S$PBB,,, | Performed by: INTERNAL MEDICINE

## 2017-12-20 PROCEDURE — 99213 OFFICE O/P EST LOW 20 MIN: CPT | Mod: PBBFAC,PO | Performed by: INTERNAL MEDICINE

## 2017-12-20 RX ORDER — FLUCONAZOLE 100 MG/1
100 TABLET ORAL DAILY
Qty: 10 TABLET | Refills: 0 | Status: SHIPPED | OUTPATIENT
Start: 2017-12-20 | End: 2017-12-30

## 2017-12-20 NOTE — PROGRESS NOTES
"HPI:  Patient is 67-year-old female who comes today with complaints of rash under her breast.  She was treated approximately 6-8 weeks ago with Lamisil for 30 days.  It did clear up the yeast infection underneath the skin fold of her lower abdomen, but it has not done anything for her breast.    Current meds have been verified and updated per the EMR  Exam:/76 (BP Location: Left arm)   Pulse 88   Temp 97.2 °F (36.2 °C) (Tympanic)   Ht 5' 3" (1.6 m)   Wt 77.4 kg (170 lb 10.2 oz)   SpO2 98%   BMI 30.23 kg/m²   She has classical yeast infection under both breasts    Lab Results   Component Value Date    WBC 5.78 02/03/2017    HGB 14.4 02/03/2017    HCT 43.4 02/03/2017     02/03/2017    CHOL 117 (L) 11/08/2017    TRIG 98 11/08/2017    HDL 52 11/08/2017    ALT 23 05/04/2017    AST 19 05/04/2017     11/08/2017    K 4.6 11/08/2017     11/08/2017    CREATININE 0.9 11/08/2017    BUN 9 11/08/2017    CO2 30 (H) 11/08/2017    TSH 3.221 11/08/2017    HGBA1C 8.1 (H) 11/08/2017       Impression:  Candidiasis  Patient Active Problem List   Diagnosis    Type II diabetes mellitus with complication    Hypertension associated with diabetes    Hyperlipidemia associated with type 2 diabetes mellitus    Parkinson disease    CAD (coronary artery disease)    GERD (gastroesophageal reflux disease)    Chronic constipation    History of breast cancer    History of CVA (cerebrovascular accident)    Major depression, recurrent       Plan:  Orders Placed This Encounter    fluconazole (DIFLUCAN) 100 MG tablet     She was taking Diflucan for 10 days.  We discussed many ways to keep the skin extremely dry.  If she doesn't keep it dry, despite the medications It will come right back    "

## 2017-12-26 ENCOUNTER — TELEPHONE (OUTPATIENT)
Dept: INTERNAL MEDICINE | Facility: CLINIC | Age: 67
End: 2017-12-26

## 2017-12-26 RX ORDER — LUBIPROSTONE 24 UG/1
CAPSULE, GELATIN COATED ORAL
Qty: 90 CAPSULE | Refills: 3 | Status: SHIPPED | OUTPATIENT
Start: 2017-12-26 | End: 2018-01-02 | Stop reason: SDUPTHER

## 2017-12-26 NOTE — TELEPHONE ENCOUNTER
Called pt.  She reports that the 180 capsules of Amitiza is too expensive.  She would like a new prescription for 100 capsules that she can afford.  Please advise.

## 2017-12-26 NOTE — TELEPHONE ENCOUNTER
----- Message from Rowena Stringer sent at 12/26/2017  4:09 PM CST -----  Contact: [phone]  Pt would like to speak with nurse about refill on  Amitiza 24mg. Does not want 400 pills requesting less. Asked to have nurse call bk to confirm pharmacy;  Please call pt joyce at [phone] thx

## 2018-01-02 RX ORDER — LUBIPROSTONE 24 UG/1
CAPSULE, GELATIN COATED ORAL
Qty: 90 CAPSULE | Refills: 3 | Status: SHIPPED | OUTPATIENT
Start: 2018-01-02 | End: 2018-11-24 | Stop reason: SDUPTHER

## 2018-01-02 RX ORDER — INSULIN GLARGINE 100 [IU]/ML
30 INJECTION, SOLUTION SUBCUTANEOUS NIGHTLY
Qty: 3 BOX | Refills: 3 | Status: SHIPPED | OUTPATIENT
Start: 2018-01-02 | End: 2018-01-22

## 2018-01-02 NOTE — TELEPHONE ENCOUNTER
----- Message from Sundar Elizabeth sent at 1/2/2018 11:43 AM CST -----  Contact: Pt  Pt request call from the nurse because her RX for insulin and another medication went to the wrong pharmacy and needs to go to ..  [facility], pt states she does not know the names of the medications, please contact the pt at [phone]

## 2018-01-02 NOTE — TELEPHONE ENCOUNTER
Patient called stating she needs for these medications to go to [facility]      Last regular followup was 11/17    Garrett was called into another pharmacy on 12/26 but she is requesting [facility]

## 2018-01-14 ENCOUNTER — NURSE TRIAGE (OUTPATIENT)
Dept: ADMINISTRATIVE | Facility: CLINIC | Age: 68
End: 2018-01-14

## 2018-01-14 NOTE — TELEPHONE ENCOUNTER
Reason for Disposition   Patient sounds very sick or weak to the triager    Protocols used: ST CONFUSION - DELIRIUM-A-AH    Daughter states that pt had not taken any of her medications  Friday or Saturday, including her insulin.. Daughter states pt appears confused b/c pt didn't know how to check her CBG today.  Daughter states pt takes her CBG on own normally.  Daughter also states that pt usually takes medication on own as well and this is unusual for pt.  Last CBG in 300s. Pt is a/a and oriented per daughter.  Daughter advised to ED per protocol and verbalizes understanding.

## 2018-01-15 ENCOUNTER — HOSPITAL ENCOUNTER (OUTPATIENT)
Dept: RADIOLOGY | Facility: HOSPITAL | Age: 68
Discharge: HOME | End: 2018-01-15
Attending: INTERNAL MEDICINE
Payer: MEDICARE

## 2018-01-15 ENCOUNTER — TELEPHONE (OUTPATIENT)
Dept: INTERNAL MEDICINE | Facility: CLINIC | Age: 68
End: 2018-01-15

## 2018-01-15 DIAGNOSIS — Z12.39 SCREENING BREAST EXAMINATION: ICD-10-CM

## 2018-01-15 NOTE — TELEPHONE ENCOUNTER
----- Message from Scott Harding sent at 1/15/2018  7:53 AM CST -----  Contact: Katherin, pt's daughter  She's calling in regards to her RX medication, pt is a little confused on how to take her insulin, please advise, [phone] (cell)

## 2018-01-16 ENCOUNTER — PATIENT MESSAGE (OUTPATIENT)
Dept: INTERNAL MEDICINE | Facility: CLINIC | Age: 68
End: 2018-01-16

## 2018-01-16 NOTE — TELEPHONE ENCOUNTER
It looks like she is supposed to be taking 1.2 mg Victoza every morning and 30 units of Lantus at night.

## 2018-01-19 ENCOUNTER — HOSPITAL ENCOUNTER (OUTPATIENT)
Dept: RADIOLOGY | Facility: HOSPITAL | Age: 68
Discharge: HOME | End: 2018-01-19
Attending: INTERNAL MEDICINE
Payer: MEDICARE

## 2018-01-19 DIAGNOSIS — Z85.3 PERSONAL HISTORY OF BREAST CANCER: ICD-10-CM

## 2018-01-19 DIAGNOSIS — R92.8 ABNORMAL MAMMOGRAM: ICD-10-CM

## 2018-01-19 PROCEDURE — 77066 DX MAMMO INCL CAD BI: CPT | Mod: 26,,, | Performed by: RADIOLOGY

## 2018-01-19 PROCEDURE — 77062 BREAST TOMOSYNTHESIS BI: CPT | Mod: 26,,, | Performed by: RADIOLOGY

## 2018-01-19 PROCEDURE — 77066 DX MAMMO INCL CAD BI: CPT | Mod: TC,PO

## 2018-01-22 ENCOUNTER — OFFICE VISIT (OUTPATIENT)
Dept: INTERNAL MEDICINE | Facility: CLINIC | Age: 68
End: 2018-01-22
Payer: MEDICARE

## 2018-01-22 VITALS
HEART RATE: 86 BPM | HEIGHT: 64 IN | SYSTOLIC BLOOD PRESSURE: 120 MMHG | TEMPERATURE: 98 F | OXYGEN SATURATION: 98 % | RESPIRATION RATE: 16 BRPM | DIASTOLIC BLOOD PRESSURE: 80 MMHG | WEIGHT: 170.63 LBS | BODY MASS INDEX: 29.13 KG/M2

## 2018-01-22 DIAGNOSIS — F33.1 MODERATE EPISODE OF RECURRENT MAJOR DEPRESSIVE DISORDER: ICD-10-CM

## 2018-01-22 DIAGNOSIS — E11.8 TYPE 2 DIABETES MELLITUS WITH COMPLICATION, WITH LONG-TERM CURRENT USE OF INSULIN: ICD-10-CM

## 2018-01-22 DIAGNOSIS — E11.59 HYPERTENSION ASSOCIATED WITH DIABETES: ICD-10-CM

## 2018-01-22 DIAGNOSIS — G20.A1 PARKINSON DISEASE: ICD-10-CM

## 2018-01-22 DIAGNOSIS — I15.2 HYPERTENSION ASSOCIATED WITH DIABETES: ICD-10-CM

## 2018-01-22 DIAGNOSIS — Z79.4 TYPE 2 DIABETES MELLITUS WITH COMPLICATION, WITH LONG-TERM CURRENT USE OF INSULIN: ICD-10-CM

## 2018-01-22 DIAGNOSIS — R11.2 NAUSEA AND VOMITING, INTRACTABILITY OF VOMITING NOT SPECIFIED, UNSPECIFIED VOMITING TYPE: Primary | ICD-10-CM

## 2018-01-22 PROCEDURE — 99213 OFFICE O/P EST LOW 20 MIN: CPT | Mod: PBBFAC,PO | Performed by: INTERNAL MEDICINE

## 2018-01-22 PROCEDURE — 99214 OFFICE O/P EST MOD 30 MIN: CPT | Mod: S$PBB,,, | Performed by: INTERNAL MEDICINE

## 2018-01-22 PROCEDURE — 99999 PR PBB SHADOW E&M-EST. PATIENT-LVL III: CPT | Mod: PBBFAC,,, | Performed by: INTERNAL MEDICINE

## 2018-01-22 RX ORDER — NITROFURANTOIN 25; 75 MG/1; MG/1
100 CAPSULE ORAL
COMMUNITY
Start: 2018-01-18 | End: 2018-01-25

## 2018-01-22 RX ORDER — ONDANSETRON 4 MG/1
4 TABLET, FILM COATED ORAL EVERY 8 HOURS PRN
Qty: 30 TABLET | Refills: 1 | Status: SHIPPED | OUTPATIENT
Start: 2018-01-22 | End: 2018-03-12

## 2018-01-22 NOTE — PROGRESS NOTES
"HPI:  Patient is a 67-year-old female who comes in today accompanied by her daughter with complaints of nausea.  About 10 days ago she went to the emergency room for Confusion.  She had lab work.  CT scan done and everything was normal.  She subsequently Route anterior in her urine and was started on antibiotics.  That was 4 days ago.  For the last week She's not been taking her insulin.  According to the daughter.  There is some concern about her being able to manage her medications.  The daughter puts out her medications one week at a time recently.  Recently she has been missing medications.  The daughter states that her sugars have been running low, despite the fact that she has not been taking her insulin    This past weekend.  She's been nauseated and has vomited 4 times.  She denies any fever, chills, sweats.  She denies any abdominal pain.  She does have complaints of a sore throat and states both her ears hurt her  Current meds have been verified and updated per the EMR  Exam:/80   Pulse 86   Temp 97.6 °F (36.4 °C)   Resp 16   Ht 5' 4" (1.626 m)   Wt 77.4 kg (170 lb 10.2 oz)   SpO2 98%   BMI 29.29 kg/m²   TMs are normal, throat normal, neck supple, no adenopathy  Chest clear  Abdomen soft, benign, no rebound no guarding, no distention.  Bowel sounds are normal.  No audible bruits or palpable masses    Lab Results   Component Value Date    WBC 5.78 02/03/2017    HGB 14.4 02/03/2017    HCT 43.4 02/03/2017     02/03/2017    CHOL 117 (L) 11/08/2017    TRIG 98 11/08/2017    HDL 52 11/08/2017    ALT 23 05/04/2017    AST 19 05/04/2017     11/08/2017    K 4.6 11/08/2017     11/08/2017    CREATININE 0.9 11/08/2017    BUN 9 11/08/2017    CO2 30 (H) 11/08/2017    TSH 3.221 11/08/2017    HGBA1C 8.1 (H) 11/08/2017       Impression:  Asymptomatic bacteriuria.  Nausea, most likely due to the Macrobid  Diabetes, according to the daughter sugars been running low  Patient Active Problem List "   Diagnosis    Type II diabetes mellitus with complication    Hypertension associated with diabetes    Hyperlipidemia associated with type 2 diabetes mellitus    Parkinson disease    CAD (coronary artery disease)    GERD (gastroesophageal reflux disease)    Chronic constipation    History of breast cancer    History of CVA (cerebrovascular accident)    Major depression, recurrent       Plan:  Orders Placed This Encounter    ondansetron (ZOFRAN) 4 MG tablet     She will stop the insulin for now.  She will start checking her sugars twice a day and record them.  She was given Zofran for the nausea.  If her symptoms are not significantly improved within 3 days, they should call me

## 2018-02-05 RX ORDER — METFORMIN HYDROCHLORIDE 500 MG/1
500 TABLET ORAL 2 TIMES DAILY WITH MEALS
Qty: 180 TABLET | Refills: 3 | Status: SHIPPED | OUTPATIENT
Start: 2018-02-05 | End: 2019-02-12 | Stop reason: SDUPTHER

## 2018-02-20 RX ORDER — ATORVASTATIN CALCIUM 80 MG/1
80 TABLET, FILM COATED ORAL DAILY
Qty: 90 TABLET | Refills: 3 | Status: SHIPPED | OUTPATIENT
Start: 2018-02-20 | End: 2018-05-25

## 2018-02-22 ENCOUNTER — LAB VISIT (OUTPATIENT)
Dept: LAB | Facility: HOSPITAL | Age: 68
End: 2018-02-22
Attending: INTERNAL MEDICINE
Payer: MEDICARE

## 2018-02-22 DIAGNOSIS — E11.8 TYPE 2 DIABETES MELLITUS WITH COMPLICATION, WITH LONG-TERM CURRENT USE OF INSULIN: ICD-10-CM

## 2018-02-22 DIAGNOSIS — Z79.4 TYPE 2 DIABETES MELLITUS WITH COMPLICATION, WITH LONG-TERM CURRENT USE OF INSULIN: ICD-10-CM

## 2018-02-22 LAB
ALBUMIN SERPL BCP-MCNC: 3.4 G/DL
ALP SERPL-CCNC: 75 U/L
ALT SERPL W/O P-5'-P-CCNC: 14 U/L
ANION GAP SERPL CALC-SCNC: 12 MMOL/L
AST SERPL-CCNC: 16 U/L
BASOPHILS # BLD AUTO: 0.07 K/UL
BASOPHILS NFR BLD: 0.9 %
BILIRUB SERPL-MCNC: 1.2 MG/DL
BUN SERPL-MCNC: 12 MG/DL
CALCIUM SERPL-MCNC: 9.2 MG/DL
CHLORIDE SERPL-SCNC: 105 MMOL/L
CO2 SERPL-SCNC: 26 MMOL/L
CREAT SERPL-MCNC: 1 MG/DL
DIFFERENTIAL METHOD: ABNORMAL
EOSINOPHIL # BLD AUTO: 1 K/UL
EOSINOPHIL NFR BLD: 12.5 %
ERYTHROCYTE [DISTWIDTH] IN BLOOD BY AUTOMATED COUNT: 13 %
EST. GFR  (AFRICAN AMERICAN): >60 ML/MIN/1.73 M^2
EST. GFR  (NON AFRICAN AMERICAN): 57.2 ML/MIN/1.73 M^2
ESTIMATED AVG GLUCOSE: 148 MG/DL
GLUCOSE SERPL-MCNC: 220 MG/DL
HBA1C MFR BLD HPLC: 6.8 %
HCT VFR BLD AUTO: 42.3 %
HGB BLD-MCNC: 13.7 G/DL
IMM GRANULOCYTES # BLD AUTO: 0.01 K/UL
IMM GRANULOCYTES NFR BLD AUTO: 0.1 %
LYMPHOCYTES # BLD AUTO: 1.2 K/UL
LYMPHOCYTES NFR BLD: 15.2 %
MCH RBC QN AUTO: 28.9 PG
MCHC RBC AUTO-ENTMCNC: 32.4 G/DL
MCV RBC AUTO: 89 FL
MONOCYTES # BLD AUTO: 0.5 K/UL
MONOCYTES NFR BLD: 6 %
NEUTROPHILS # BLD AUTO: 5.3 K/UL
NEUTROPHILS NFR BLD: 65.3 %
NRBC BLD-RTO: 0 /100 WBC
PLATELET # BLD AUTO: 289 K/UL
PMV BLD AUTO: 10.3 FL
POTASSIUM SERPL-SCNC: 4.4 MMOL/L
PROT SERPL-MCNC: 6.7 G/DL
RBC # BLD AUTO: 4.74 M/UL
SODIUM SERPL-SCNC: 143 MMOL/L
TSH SERPL DL<=0.005 MIU/L-ACNC: 2.01 UIU/ML
WBC # BLD AUTO: 8.16 K/UL

## 2018-02-22 PROCEDURE — 84443 ASSAY THYROID STIM HORMONE: CPT

## 2018-02-22 PROCEDURE — 83036 HEMOGLOBIN GLYCOSYLATED A1C: CPT

## 2018-02-22 PROCEDURE — 36415 COLL VENOUS BLD VENIPUNCTURE: CPT | Mod: PO

## 2018-02-22 PROCEDURE — 85025 COMPLETE CBC W/AUTO DIFF WBC: CPT

## 2018-02-22 PROCEDURE — 80053 COMPREHEN METABOLIC PANEL: CPT

## 2018-02-23 ENCOUNTER — PATIENT OUTREACH (OUTPATIENT)
Dept: ADMINISTRATIVE | Facility: HOSPITAL | Age: 68
End: 2018-02-23

## 2018-02-26 ENCOUNTER — TELEPHONE (OUTPATIENT)
Dept: INTERNAL MEDICINE | Facility: CLINIC | Age: 68
End: 2018-02-26

## 2018-02-26 RX ORDER — PANTOPRAZOLE SODIUM 40 MG/1
40 TABLET, DELAYED RELEASE ORAL DAILY
Qty: 90 TABLET | Refills: 3 | Status: SHIPPED | OUTPATIENT
Start: 2018-02-26 | End: 2019-03-10 | Stop reason: SDUPTHER

## 2018-02-26 NOTE — TELEPHONE ENCOUNTER
----- Message from Olivia Stone sent at 2/26/2018  1:39 PM CST -----  Contact: pt  Please call pt @ [phone], pt will discuss with nurse.

## 2018-03-09 ENCOUNTER — DOCUMENTATION ONLY (OUTPATIENT)
Dept: INTERNAL MEDICINE | Facility: CLINIC | Age: 68
End: 2018-03-09

## 2018-03-09 ENCOUNTER — OFFICE VISIT (OUTPATIENT)
Dept: INTERNAL MEDICINE | Facility: CLINIC | Age: 68
End: 2018-03-09
Payer: MEDICARE

## 2018-03-09 VITALS
OXYGEN SATURATION: 98 % | HEART RATE: 84 BPM | BODY MASS INDEX: 26.12 KG/M2 | WEIGHT: 153 LBS | SYSTOLIC BLOOD PRESSURE: 130 MMHG | HEIGHT: 64 IN | TEMPERATURE: 98 F | RESPIRATION RATE: 16 BRPM | DIASTOLIC BLOOD PRESSURE: 80 MMHG

## 2018-03-09 DIAGNOSIS — F33.1 MODERATE EPISODE OF RECURRENT MAJOR DEPRESSIVE DISORDER: ICD-10-CM

## 2018-03-09 DIAGNOSIS — I15.2 HYPERTENSION ASSOCIATED WITH DIABETES: ICD-10-CM

## 2018-03-09 DIAGNOSIS — E11.59 HYPERTENSION ASSOCIATED WITH DIABETES: ICD-10-CM

## 2018-03-09 DIAGNOSIS — E78.5 HYPERLIPIDEMIA ASSOCIATED WITH TYPE 2 DIABETES MELLITUS: ICD-10-CM

## 2018-03-09 DIAGNOSIS — E11.8 TYPE 2 DIABETES MELLITUS WITH COMPLICATION, WITH LONG-TERM CURRENT USE OF INSULIN: Primary | ICD-10-CM

## 2018-03-09 DIAGNOSIS — G20.A1 PARKINSON DISEASE: ICD-10-CM

## 2018-03-09 DIAGNOSIS — E11.69 HYPERLIPIDEMIA ASSOCIATED WITH TYPE 2 DIABETES MELLITUS: ICD-10-CM

## 2018-03-09 DIAGNOSIS — Z86.73 HISTORY OF CVA (CEREBROVASCULAR ACCIDENT): ICD-10-CM

## 2018-03-09 DIAGNOSIS — K21.9 GASTROESOPHAGEAL REFLUX DISEASE, ESOPHAGITIS PRESENCE NOT SPECIFIED: ICD-10-CM

## 2018-03-09 DIAGNOSIS — I25.10 CORONARY ARTERY DISEASE, ANGINA PRESENCE UNSPECIFIED, UNSPECIFIED VESSEL OR LESION TYPE, UNSPECIFIED WHETHER NATIVE OR TRANSPLANTED HEART: ICD-10-CM

## 2018-03-09 DIAGNOSIS — Z12.11 SCREENING FOR COLON CANCER: ICD-10-CM

## 2018-03-09 DIAGNOSIS — Z85.3 HISTORY OF BREAST CANCER: ICD-10-CM

## 2018-03-09 DIAGNOSIS — Z79.4 TYPE 2 DIABETES MELLITUS WITH COMPLICATION, WITH LONG-TERM CURRENT USE OF INSULIN: Primary | ICD-10-CM

## 2018-03-09 PROCEDURE — 99214 OFFICE O/P EST MOD 30 MIN: CPT | Mod: PBBFAC,PO | Performed by: INTERNAL MEDICINE

## 2018-03-09 PROCEDURE — 99999 PR PBB SHADOW E&M-EST. PATIENT-LVL IV: CPT | Mod: PBBFAC,,, | Performed by: INTERNAL MEDICINE

## 2018-03-09 PROCEDURE — 99214 OFFICE O/P EST MOD 30 MIN: CPT | Mod: S$PBB,,, | Performed by: INTERNAL MEDICINE

## 2018-03-12 ENCOUNTER — HOSPITAL ENCOUNTER (EMERGENCY)
Facility: HOSPITAL | Age: 68
Discharge: HOME | End: 2018-03-13
Attending: EMERGENCY MEDICINE
Payer: MEDICARE

## 2018-03-12 ENCOUNTER — OFFICE VISIT (OUTPATIENT)
Dept: INTERNAL MEDICINE | Facility: CLINIC | Age: 68
End: 2018-03-12
Payer: MEDICARE

## 2018-03-12 ENCOUNTER — HOSPITAL ENCOUNTER (OUTPATIENT)
Dept: RADIOLOGY | Facility: HOSPITAL | Age: 68
Discharge: HOME | End: 2018-03-12
Attending: NURSE PRACTITIONER
Payer: MEDICARE

## 2018-03-12 VITALS
SYSTOLIC BLOOD PRESSURE: 118 MMHG | BODY MASS INDEX: 25.85 KG/M2 | HEART RATE: 102 BPM | OXYGEN SATURATION: 98 % | TEMPERATURE: 97 F | RESPIRATION RATE: 18 BRPM | DIASTOLIC BLOOD PRESSURE: 70 MMHG | WEIGHT: 151.44 LBS | HEIGHT: 64 IN

## 2018-03-12 VITALS
RESPIRATION RATE: 20 BRPM | HEART RATE: 90 BPM | SYSTOLIC BLOOD PRESSURE: 173 MMHG | TEMPERATURE: 98 F | DIASTOLIC BLOOD PRESSURE: 80 MMHG | HEIGHT: 64 IN | BODY MASS INDEX: 26 KG/M2 | OXYGEN SATURATION: 98 %

## 2018-03-12 DIAGNOSIS — L03.119 CELLULITIS OF HAND: ICD-10-CM

## 2018-03-12 DIAGNOSIS — M79.89 SWELLING OF LEFT HAND: Primary | ICD-10-CM

## 2018-03-12 DIAGNOSIS — R11.10 VOMITING, INTRACTABILITY OF VOMITING NOT SPECIFIED, PRESENCE OF NAUSEA NOT SPECIFIED, UNSPECIFIED VOMITING TYPE: ICD-10-CM

## 2018-03-12 DIAGNOSIS — R07.9 CHEST PAIN: ICD-10-CM

## 2018-03-12 DIAGNOSIS — R55 VASOVAGAL SYNCOPE: Primary | ICD-10-CM

## 2018-03-12 DIAGNOSIS — M79.89 SWELLING OF LEFT HAND: ICD-10-CM

## 2018-03-12 DIAGNOSIS — L03.90 CELLULITIS, UNSPECIFIED CELLULITIS SITE: ICD-10-CM

## 2018-03-12 LAB
ALBUMIN SERPL BCP-MCNC: 3.8 G/DL
ALP SERPL-CCNC: 65 U/L
ALT SERPL W/O P-5'-P-CCNC: 8 U/L
ANION GAP SERPL CALC-SCNC: 10 MMOL/L
AST SERPL-CCNC: 15 U/L
BASOPHILS # BLD AUTO: 0.02 K/UL
BASOPHILS NFR BLD: 0.2 %
BILIRUB SERPL-MCNC: 2 MG/DL
BUN SERPL-MCNC: 13 MG/DL
CALCIUM SERPL-MCNC: 9.5 MG/DL
CHLORIDE SERPL-SCNC: 104 MMOL/L
CO2 SERPL-SCNC: 26 MMOL/L
CREAT SERPL-MCNC: 0.7 MG/DL
DIFFERENTIAL METHOD: ABNORMAL
EOSINOPHIL # BLD AUTO: 0.2 K/UL
EOSINOPHIL NFR BLD: 2.1 %
ERYTHROCYTE [DISTWIDTH] IN BLOOD BY AUTOMATED COUNT: 13.4 %
EST. GFR  (AFRICAN AMERICAN): >60 ML/MIN/1.73 M^2
EST. GFR  (NON AFRICAN AMERICAN): >60 ML/MIN/1.73 M^2
GLUCOSE SERPL-MCNC: 145 MG/DL
HCT VFR BLD AUTO: 42.3 %
HGB BLD-MCNC: 14.6 G/DL
LYMPHOCYTES # BLD AUTO: 1.1 K/UL
LYMPHOCYTES NFR BLD: 11.8 %
MCH RBC QN AUTO: 30.7 PG
MCHC RBC AUTO-ENTMCNC: 34.5 G/DL
MCV RBC AUTO: 89 FL
MONOCYTES # BLD AUTO: 0.5 K/UL
MONOCYTES NFR BLD: 5.4 %
NEUTROPHILS # BLD AUTO: 7.7 K/UL
NEUTROPHILS NFR BLD: 80.5 %
PLATELET # BLD AUTO: 238 K/UL
PMV BLD AUTO: 9.3 FL
POTASSIUM SERPL-SCNC: 3.6 MMOL/L
PROT SERPL-MCNC: 7 G/DL
RBC # BLD AUTO: 4.75 M/UL
SODIUM SERPL-SCNC: 140 MMOL/L
TROPONIN I SERPL DL<=0.01 NG/ML-MCNC: <0.006 NG/ML
WBC # BLD AUTO: 9.53 K/UL

## 2018-03-12 PROCEDURE — 93010 ELECTROCARDIOGRAM REPORT: CPT | Mod: ,,, | Performed by: INTERNAL MEDICINE

## 2018-03-12 PROCEDURE — 73130 X-RAY EXAM OF HAND: CPT | Mod: 26,LT,, | Performed by: RADIOLOGY

## 2018-03-12 PROCEDURE — 96374 THER/PROPH/DIAG INJ IV PUSH: CPT

## 2018-03-12 PROCEDURE — 25000003 PHARM REV CODE 250: Performed by: EMERGENCY MEDICINE

## 2018-03-12 PROCEDURE — 96361 HYDRATE IV INFUSION ADD-ON: CPT

## 2018-03-12 PROCEDURE — 99213 OFFICE O/P EST LOW 20 MIN: CPT | Mod: PBBFAC,25,PO | Performed by: NURSE PRACTITIONER

## 2018-03-12 PROCEDURE — 73130 X-RAY EXAM OF HAND: CPT | Mod: TC,PO,LT

## 2018-03-12 PROCEDURE — 99213 OFFICE O/P EST LOW 20 MIN: CPT | Mod: S$PBB,,, | Performed by: NURSE PRACTITIONER

## 2018-03-12 PROCEDURE — 99284 EMERGENCY DEPT VISIT MOD MDM: CPT | Mod: 25,27

## 2018-03-12 PROCEDURE — 93005 ELECTROCARDIOGRAM TRACING: CPT

## 2018-03-12 PROCEDURE — 80053 COMPREHEN METABOLIC PANEL: CPT

## 2018-03-12 PROCEDURE — 99999 PR PBB SHADOW E&M-EST. PATIENT-LVL III: CPT | Mod: PBBFAC,,, | Performed by: NURSE PRACTITIONER

## 2018-03-12 PROCEDURE — 85025 COMPLETE CBC W/AUTO DIFF WBC: CPT

## 2018-03-12 PROCEDURE — 84484 ASSAY OF TROPONIN QUANT: CPT

## 2018-03-12 RX ORDER — SULFAMETHOXAZOLE AND TRIMETHOPRIM 800; 160 MG/1; MG/1
1 TABLET ORAL 2 TIMES DAILY
Qty: 20 TABLET | Refills: 0 | Status: SHIPPED | OUTPATIENT
Start: 2018-03-12 | End: 2018-05-25

## 2018-03-12 RX ORDER — METOPROLOL TARTRATE 1 MG/ML
5 INJECTION, SOLUTION INTRAVENOUS
Status: COMPLETED | OUTPATIENT
Start: 2018-03-12 | End: 2018-03-12

## 2018-03-12 RX ORDER — ONDANSETRON 4 MG/1
4 TABLET, FILM COATED ORAL EVERY 8 HOURS PRN
Qty: 30 TABLET | Refills: 1 | Status: SHIPPED | OUTPATIENT
Start: 2018-03-12 | End: 2018-03-14

## 2018-03-12 RX ORDER — METOPROLOL SUCCINATE 25 MG/1
25 TABLET, EXTENDED RELEASE ORAL DAILY
Qty: 30 TABLET | Refills: 11 | Status: SHIPPED | OUTPATIENT
Start: 2018-03-12 | End: 2018-09-11

## 2018-03-12 RX ADMIN — SODIUM CHLORIDE 1000 ML: 0.9 INJECTION, SOLUTION INTRAVENOUS at 09:03

## 2018-03-12 RX ADMIN — METOROPROLOL TARTRATE 5 MG: 5 INJECTION, SOLUTION INTRAVENOUS at 11:03

## 2018-03-12 NOTE — PROGRESS NOTES
"Subjective:      Patient ID: Mari Mayorga is a 68 y.o. female.    Chief Complaint: hand swollen (left)    HPI:  Patient is here with family today.  Says she went to bed last night with no issues, woke up this am and her left hand was red and swollen.  She doesn't remember falling or hitting it on anything.  She has some pain with extreme movement.      Past Medical History:   Diagnosis Date    Breast cancer     CAD (coronary artery disease)     s/p stent 2016    Chronic constipation     GERD (gastroesophageal reflux disease)     History of breast cancer     bilateral breast, XRT both times, no ctx    History of CVA (cerebrovascular accident)     Hyperlipidemia associated with type 2 diabetes mellitus     Hypertension associated with diabetes     Major depression, recurrent     Parkinson disease     Type II diabetes mellitus with complication        Past Surgical History:   Procedure Laterality Date    BREAST LUMPECTOMY         Lab Results   Component Value Date    WBC 8.16 02/22/2018    HGB 13.7 02/22/2018    HCT 42.3 02/22/2018     02/22/2018    CHOL 117 (L) 11/08/2017    TRIG 98 11/08/2017    HDL 52 11/08/2017    ALT 14 02/22/2018    AST 16 02/22/2018     02/22/2018    K 4.4 02/22/2018     02/22/2018    CREATININE 1.0 02/22/2018    BUN 12 02/22/2018    CO2 26 02/22/2018    TSH 2.007 02/22/2018    HGBA1C 6.8 (H) 02/22/2018       /70   Pulse 102   Temp 97.2 °F (36.2 °C)   Resp 18   Ht 5' 3.78" (1.62 m)   Wt 68.7 kg (151 lb 7.3 oz)   SpO2 98%   BMI 26.18 kg/m²       Review of Systems   Constitutional: Negative for appetite change, fatigue and unexpected weight change.   HENT: Negative for congestion, ear pain, postnasal drip, rhinorrhea, sinus pressure, sneezing, sore throat, tinnitus, trouble swallowing and voice change.    Eyes: Negative for pain, discharge, redness, itching and visual disturbance.   Respiratory: Negative for cough, chest tightness, shortness of " breath and wheezing.    Cardiovascular: Negative for chest pain, palpitations and leg swelling.   Gastrointestinal: Negative for abdominal distention, abdominal pain, blood in stool, constipation, diarrhea, nausea and vomiting.        No reflux.   Genitourinary: Negative for difficulty urinating, dyspareunia, flank pain, menstrual problem and pelvic pain.   Musculoskeletal: Positive for arthralgias. Negative for back pain, myalgias and neck stiffness.   Skin: Negative for color change and rash.   Neurological: Negative for dizziness and headaches.   Psychiatric/Behavioral: Negative for confusion and sleep disturbance. The patient is not nervous/anxious.       Objective:     Physical Exam   Constitutional: She is oriented to person, place, and time. She appears well-developed and well-nourished. No distress.   Musculoskeletal:   Normal gait  Left dorsum of hand near thumb is warm, mildly red and swollen compared to the right hand.  No skin lesions, no cuts or bug bites seen.  Mild pain with wrist flexion, and hand grasp, can move fingers   Neurological: She is alert and oriented to person, place, and time.   Skin: Skin is warm and dry.   Psychiatric: She has a normal mood and affect. Her behavior is normal.     Assessment:      1. Swelling of left hand    2. Cellulitis, unspecified cellulitis site      Plan:   Swelling of left hand  -     X-Ray Hand 3 View Left; Future; Expected date: 03/12/2018    Cellulitis, unspecified cellulitis site    Other orders  -     sulfamethoxazole-trimethoprim 800-160mg (BACTRIM DS) 800-160 mg Tab; Take 1 tablet by mouth 2 (two) times daily.  Dispense: 20 tablet; Refill: 0    xray only shows arthritis.  Will treat with the above, she will let me know how she is doing in 3 days or sooner if getting worse      Current Outpatient Prescriptions:     ALPRAZolam (XANAX) 0.25 MG tablet, Take 1 tablet (0.25 mg total) by mouth 3 (three) times daily as needed., Disp: 90 tablet, Rfl: 1    AMITIZA  24 mcg Cap, TAKE 1 CAPSULE BY MOUTH DAILY WITH MEALS, Disp: 90 capsule, Rfl: 3    amlodipine (NORVASC) 2.5 MG tablet, Take 2.5 mg by mouth once daily., Disp: , Rfl: 5    aspirin (ECOTRIN) 81 MG EC tablet, Take 81 mg by mouth., Disp: , Rfl:     atorvastatin (LIPITOR) 80 MG tablet, Take 1 tablet (80 mg total) by mouth once daily., Disp: 90 tablet, Rfl: 3    blood sugar diagnostic Strp, Check blood sugar (4) times daily, Disp: , Rfl:     blood-glucose meter (CLEVER CHOICE BLOOD GLUC SYS) [facility], , Disp: , Rfl:     buPROPion (WELLBUTRIN XL) 300 MG 24 hr tablet, Take 1 tablet (300 mg total) by mouth once daily., Disp: 90 tablet, Rfl: 3    carbidopa-levodopa  mg (SINEMET)  mg per tablet, Take 2 tablets by mouth 4 (four) times daily. Takes 2 1/2 pills by mouth four times daily, Disp: , Rfl:     glimepiride (AMARYL) 4 MG tablet, Take 1 tablet (4 mg total) by mouth daily with breakfast., Disp: 90 tablet, Rfl: 3    insulin syringes, disposable, 1 mL Syrg, Inject into the skin., Disp: , Rfl:     lancets 28 gauge Misc, Inject 1 applicator into the skin., Disp: , Rfl:     lancing device (LANCING DEVICE WITH LANCETS) Misc, , Disp: , Rfl:     liraglutide 0.6 mg/0.1 mL, 18 mg/3 mL, subq PNIJ (VICTOZA 2-MADDY) 0.6 mg/0.1 mL (18 mg/3 mL) PnIj, Inject 1.2 mg into the skin once daily., Disp: 6 mL, Rfl: 11    lisinopril (PRINIVIL,ZESTRIL) 40 MG tablet, Take 1 tablet by mouth once daily., Disp: , Rfl:     metFORMIN (GLUCOPHAGE) 500 MG tablet, TAKE 1 TABLET (500 MG TOTAL) BY MOUTH 2 (TWO) TIMES DAILY WITH MEALS., Disp: 180 tablet, Rfl: 3    miconazole (MICOTIN) 2 % cream, Apply topically 2 (two) times daily., Disp: 57 g, Rfl: 3    nystatin (MYCOSTATIN) powder, Apply topically 2 (two) times daily., Disp: 60 g, Rfl: 6    ondansetron (ZOFRAN) 4 MG tablet, Take 1 tablet (4 mg total) by mouth every 8 (eight) hours as needed for Nausea., Disp: 30 tablet, Rfl: 1    pantoprazole (PROTONIX) 40 MG tablet, Take 1 tablet (40  mg total) by mouth once daily., Disp: 90 tablet, Rfl: 3    sulfamethoxazole-trimethoprim 800-160mg (BACTRIM DS) 800-160 mg Tab, Take 1 tablet by mouth 2 (two) times daily., Disp: 20 tablet, Rfl: 0

## 2018-03-13 NOTE — ED NOTES
Pt. Presents to the ED AAOx3 with complaint of generalized weakness onset x3-4 weeks on average. Pt. States she began with having unpredictable, intermittent episodes of vomiting over the past few weeks. Pt. States she vomits at any given time and it is not related to any foods or time of day in which the vomiting occurs. Pt. Denies diarrhea and states that her appendix and gall bladder have been removed. Pt.'s last episode was today after taking her antibiotics for treatment of cellulitis to right hand and eating eggs. No active vomiting at this time. Plan of care continued.

## 2018-03-13 NOTE — ED PROVIDER NOTES
"SCRIBE #1 NOTE: I, Celestina Francisco, am scribing for, and in the presence of, Bettina Shafer Do, MD. I have scribed the entire note.      History      Chief Complaint   Patient presents with    Nausea and Vomiting     for 1 week.  Had syncopal episode this evening.       Review of patient's allergies indicates:   Allergen Reactions    Morphine Hives        HPI   HPI    3/12/2018, 8:38 PM   History obtained from the patient and relative      History of Present Illness: Mari Mayorga is a 68 y.o. female patient who presents to the Emergency Department for evaluation of a syncopal episode which onset suddenly this evening. Pt's relative states pt was seen at the doctor yesterday for L hand cellulitis and was prescribed Batrim. Pt took Bactrim at 11 am today after eating breakfast and had 1x vomiting episode at 5 pm, followed by "extreme weakness". Pt's relative found pt a few hours later and found out pt had fallen after having 1x syncopal episode. After the fall, pt was very weak and could not get up. Pt includes she felt nauseated and had 4x vomiting episodes last Monday. Symptoms are constant and moderate in severity. No mitigating or exacerbating factors reported. Associated sxs include lightheadedness and dizziness.. Patient denies any fever, chills, SOB, CP, abdominal pain, diarrhea, and all other sxs at this time. No prior Tx included. No further complaints or concerns at this time.         Arrival mode: Personal vehicle    PCP: Rob Sauceda MD       Past Medical History:  Past Medical History:   Diagnosis Date    Breast cancer     CAD (coronary artery disease)     s/p stent 2016    Chronic constipation     GERD (gastroesophageal reflux disease)     History of breast cancer     bilateral breast, XRT both times, no ctx    History of CVA (cerebrovascular accident)     Hyperlipidemia associated with type 2 diabetes mellitus     Hypertension associated with diabetes     Major depression, recurrent  "    Parkinson disease     Type II diabetes mellitus with complication        Past Surgical History:  Past Surgical History:   Procedure Laterality Date    BREAST LUMPECTOMY           Family History:  Family History   Problem Relation Age of Onset    Diabetes Mother        Social History:  Social History     Social History Main Topics    Smoking status: Former Smoker    Smokeless tobacco: Never Used    Alcohol use No    Drug use: No    Sexual activity: No       ROS   Review of Systems   Constitutional: Negative for chills, diaphoresis, fatigue and fever.   HENT: Negative for congestion, nosebleeds, rhinorrhea, sneezing and sore throat.    Respiratory: Negative for cough, chest tightness, shortness of breath and wheezing.    Cardiovascular: Negative for chest pain and leg swelling.   Gastrointestinal: Positive for nausea and vomiting. Negative for abdominal pain, constipation and diarrhea.   Genitourinary: Negative for dysuria, hematuria and urgency.   Musculoskeletal: Negative for back pain, joint swelling, myalgias, neck pain and neck stiffness.   Skin: Negative for rash and wound.   Neurological: Positive for dizziness, weakness and light-headedness. Negative for numbness and headaches.   All other systems reviewed and are negative.    Physical Exam      Initial Vitals [03/12/18 1938]   BP Pulse Resp Temp SpO2   116/76 90 20 98.4 °F (36.9 °C) 98 %      MAP       89.33          Physical Exam  Nursing Notes and Vital Signs Reviewed.  Constitutional: Patient is in no acute distress. Well-developed and well-nourished.  Head: Atraumatic. Normocephalic.  Eyes: PERRL. EOM intact. Conjunctivae are not pale. No scleral icterus.  ENT: Mucous membranes are moist. Oropharynx is clear and symmetric.    Neck: Supple. Full ROM. No lymphadenopathy.  Cardiovascular: Regular rate. Regular rhythm. No murmurs, rubs, or gallops. Capillary refill good.  Pulmonary/Chest: No respiratory distress. Clear to auscultation bilaterally.  "No wheezing or rales.  Abdominal: Soft and non-distended.  There is no tenderness.    Musculoskeletal: Moves all extremities. No obvious deformities.   Skin: Warm and dry. Cellulitis to dorsal aspect of L hand.  Neurological:  Alert, awake, and appropriate.  Normal speech.  No acute focal neurological deficits are appreciated.  Psychiatric: Normal affect. Good eye contact. Appropriate in content.    ED Course    Procedures  ED Vital Signs:  Vitals:    03/12/18 1938 03/12/18 2350   BP: 116/76 (!) 173/80   Pulse: 90    Resp: 20    Temp: 98.4 °F (36.9 °C)    TempSrc: Oral    SpO2: 98%    Height: 5' 4" (1.626 m)        Abnormal Lab Results:  Labs Reviewed   CBC W/ AUTO DIFFERENTIAL - Abnormal; Notable for the following:        Result Value    Gran% 80.5 (*)     Lymph% 11.8 (*)     All other components within normal limits   COMPREHENSIVE METABOLIC PANEL - Abnormal; Notable for the following:     Glucose 145 (*)     Total Bilirubin 2.0 (*)     ALT 8 (*)     All other components within normal limits   TROPONIN I        All Lab Results:  Results for orders placed or performed during the hospital encounter of 03/12/18   CBC auto differential   Result Value Ref Range    WBC 9.53 3.90 - 12.70 K/uL    RBC 4.75 4.00 - 5.40 M/uL    Hemoglobin 14.6 12.0 - 16.0 g/dL    Hematocrit 42.3 37.0 - 48.5 %    MCV 89 82 - 98 fL    MCH 30.7 27.0 - 31.0 pg    MCHC 34.5 32.0 - 36.0 g/dL    RDW 13.4 11.5 - 14.5 %    Platelets 238 150 - 350 K/uL    MPV 9.3 9.2 - 12.9 fL    Gran # (ANC) 7.7 1.8 - 7.7 K/uL    Lymph # 1.1 1.0 - 4.8 K/uL    Mono # 0.5 0.3 - 1.0 K/uL    Eos # 0.2 0.0 - 0.5 K/uL    Baso # 0.02 0.00 - 0.20 K/uL    Gran% 80.5 (H) 38.0 - 73.0 %    Lymph% 11.8 (L) 18.0 - 48.0 %    Mono% 5.4 4.0 - 15.0 %    Eosinophil% 2.1 0.0 - 8.0 %    Basophil% 0.2 0.0 - 1.9 %    Differential Method Automated    Comprehensive metabolic panel   Result Value Ref Range    Sodium 140 136 - 145 mmol/L    Potassium 3.6 3.5 - 5.1 mmol/L    Chloride 104 95 - " 110 mmol/L    CO2 26 23 - 29 mmol/L    Glucose 145 (H) 70 - 110 mg/dL    BUN, Bld 13 8 - 23 mg/dL    Creatinine 0.7 0.5 - 1.4 mg/dL    Calcium 9.5 8.7 - 10.5 mg/dL    Total Protein 7.0 6.0 - 8.4 g/dL    Albumin 3.8 3.5 - 5.2 g/dL    Total Bilirubin 2.0 (H) 0.1 - 1.0 mg/dL    Alkaline Phosphatase 65 55 - 135 U/L    AST 15 10 - 40 U/L    ALT 8 (L) 10 - 44 U/L    Anion Gap 10 8 - 16 mmol/L    eGFR if African American >60 >60 mL/min/1.73 m^2    eGFR if non African American >60 >60 mL/min/1.73 m^2   Troponin I #1   Result Value Ref Range    Troponin I <0.006 0.000 - 0.026 ng/mL         Imaging Results:  Imaging Results          X-Ray Chest PA And Lateral (Final result)  Result time 03/12/18 22:16:11    Final result by Zelda Andersen MD (03/12/18 22:16:11)                 Impression:     No acute cardiopulmonary disease.            Electronically signed by: ZELDA ANDERSEN MD  Date:     03/12/18  Time:    22:16              Narrative:    EXAM:   [id number] CHEST PA AND LATERAL    CLINICAL HISTORY:  Chest Pain     COMPARISON: No relevant priors    Findings:     The lungs are clear. The cardiac silhouette is within normal limits.                             The EKG was ordered, reviewed, and independently interpreted by the ED provider.  Interpretation time: 20:46  Rate: 102 BPM  Rhythm: sinus tachycardia  Interpretation: Low voltage QRS. No STEMI.         The Emergency Provider reviewed the vital signs and test results, which are outlined above.    ED Discussion     11:26 PM: Reassessed pt at this time.  Pt states her condition has improved at this time. Pt's HR is still high. Pt has a cardiologist appt in a week and will follow up with them. Pt will start Metroprolol. Pt will continue taking Bactrim but will follow up with doctor for recheck. Discussed with pt all pertinent ED information and results. Discussed to pt dx and plan of tx. Gave pt all f/u and return to the ED instructions. All questions and  concerns were addressed at this time. Pt expresses understanding of information and instructions, and is comfortable with plan to discharge. Pt is stable for discharge.    Patient presents with a syncopal or near-syncopal episode. I have no suspicion that the event is secondary to an arrhythmia such as WPW, prolonged QT syndrome, or ventricular tachycardia. I have no suspicion for a seizure episode, intracranial hemorrhage, pulmonary embolus, myocardial infarction, sepsis, ectopic pregnancy, hemorrhagic shock, or hypoglycemia. Based on my evaluation, there is no emergent medical condition. Syncope precautions were discussed with the patient and/or caretaker, specifically not to swim or bathe unattended, not to operate motor vehicles or other machinery, and to avoid heights or other areas where falls may occur until cleared by primary care physician. Patient is safe for discharge.       ED Medication(s):  Medications   sodium chloride 0.9% bolus 1,000 mL (0 mLs Intravenous Stopped 3/12/18 5498)   metoprolol injection 5 mg (5 mg Intravenous Given 3/12/18 4946)       Discharge Medication List as of 3/12/2018 11:26 PM      START taking these medications    Details   metoprolol succinate (TOPROL-XL) 25 MG 24 hr tablet Take 1 tablet (25 mg total) by mouth once daily., Starting Mon 3/12/2018, Until Tue 3/12/2019, Normal             Follow-up Information     Rob Sauceda MD In 2 days.    Specialty:  Internal Medicine  Why:  Keep your appointment with your Cardiologist next week.  Contact information:  [address]  [address]  [facility] [address]  [phone]                     Medical Decision Making    Medical Decision Making:   Clinical Tests:   Lab Tests: Ordered and Reviewed  Radiological Study: Ordered and Reviewed  Medical Tests: Ordered and Reviewed           Scribe Attestation:   Scribe #1: I performed the above scribed service and the documentation accurately describes the services I performed. I attest to  the accuracy of the note.    Attending:   Physician Attestation Statement for Scribe #1: I, Bettina Shafer Do, MD, personally performed the services described in this documentation, as scribed by Celestina Francisco, in my presence, and it is both accurate and complete.          Clinical Impression       ICD-10-CM ICD-9-CM   1. Vasovagal syncope R55 780.2   2. Chest pain R07.9 786.50   3. Vomiting, intractability of vomiting not specified, presence of nausea not specified, unspecified vomiting type R11.10 787.03   4. Cellulitis of hand L03.119 682.4       Disposition:   Disposition: Discharged  Condition: Stable         Bettina Shafer Do, MD  03/13/18 0318

## 2018-03-13 NOTE — ED NOTES
Pt. Taken to radiology for procedure via stretcher per tech. Stable upon transport. Plan of care continued.

## 2018-03-14 ENCOUNTER — OFFICE VISIT (OUTPATIENT)
Dept: INTERNAL MEDICINE | Facility: CLINIC | Age: 68
End: 2018-03-14
Payer: MEDICARE

## 2018-03-14 ENCOUNTER — PES CALL (OUTPATIENT)
Dept: ADMINISTRATIVE | Facility: CLINIC | Age: 68
End: 2018-03-14

## 2018-03-14 VITALS
OXYGEN SATURATION: 98 % | TEMPERATURE: 98 F | RESPIRATION RATE: 18 BRPM | BODY MASS INDEX: 25.7 KG/M2 | SYSTOLIC BLOOD PRESSURE: 140 MMHG | HEIGHT: 64 IN | DIASTOLIC BLOOD PRESSURE: 86 MMHG | WEIGHT: 150.56 LBS | HEART RATE: 95 BPM

## 2018-03-14 DIAGNOSIS — R55 VASOVAGAL EPISODE: Primary | ICD-10-CM

## 2018-03-14 DIAGNOSIS — L03.90 CELLULITIS, UNSPECIFIED CELLULITIS SITE: ICD-10-CM

## 2018-03-14 PROCEDURE — 99213 OFFICE O/P EST LOW 20 MIN: CPT | Mod: S$PBB,,, | Performed by: NURSE PRACTITIONER

## 2018-03-14 PROCEDURE — 99215 OFFICE O/P EST HI 40 MIN: CPT | Mod: PBBFAC,PO | Performed by: NURSE PRACTITIONER

## 2018-03-14 PROCEDURE — 99999 PR PBB SHADOW E&M-EST. PATIENT-LVL V: CPT | Mod: PBBFAC,,, | Performed by: NURSE PRACTITIONER

## 2018-03-14 RX ORDER — MECLIZINE HYDROCHLORIDE 25 MG/1
25 TABLET ORAL 3 TIMES DAILY PRN
Qty: 60 TABLET | Refills: 0 | Status: SHIPPED | OUTPATIENT
Start: 2018-03-14 | End: 2018-10-03

## 2018-03-14 RX ORDER — ONDANSETRON 4 MG/1
4 TABLET, ORALLY DISINTEGRATING ORAL ONCE
Qty: 20 TABLET | Refills: 0 | Status: SHIPPED | OUTPATIENT
Start: 2018-03-14 | End: 2018-03-14

## 2018-03-14 NOTE — PROGRESS NOTES
"Subjective:      Patient ID: Mari Mayorga is a 68 y.o. female.    Chief Complaint: Nausea and vomiting    HPI:  Patent was seen in the ER two days ago for weakness, dizziness, vomiting, passed out.  She was worked up, had IV fluids, sent home.  She was also recently started on Bactrim for hand cellulitis.  She and her daughter do not think the bactrim is causing these problems.  Her nausea continues.  She says she is having normal bowel movements, no diarrhea or constipation, no fever.      Past Medical History:   Diagnosis Date    Breast cancer     CAD (coronary artery disease)     s/p stent 2016    Chronic constipation     GERD (gastroesophageal reflux disease)     History of breast cancer     bilateral breast, XRT both times, no ctx    History of CVA (cerebrovascular accident)     Hyperlipidemia associated with type 2 diabetes mellitus     Hypertension associated with diabetes     Major depression, recurrent     Parkinson disease     Type II diabetes mellitus with complication        Past Surgical History:   Procedure Laterality Date    BREAST LUMPECTOMY         Lab Results   Component Value Date    WBC 9.53 03/12/2018    HGB 14.6 03/12/2018    HCT 42.3 03/12/2018     03/12/2018    CHOL 117 (L) 11/08/2017    TRIG 98 11/08/2017    HDL 52 11/08/2017    ALT 8 (L) 03/12/2018    AST 15 03/12/2018     03/12/2018    K 3.6 03/12/2018     03/12/2018    CREATININE 0.7 03/12/2018    BUN 13 03/12/2018    CO2 26 03/12/2018    TSH 2.007 02/22/2018    HGBA1C 6.8 (H) 02/22/2018       BP (!) 140/86   Pulse 95   Temp 98 °F (36.7 °C)   Resp 18   Ht 5' 4" (1.626 m)   Wt 68.3 kg (150 lb 9.2 oz)   SpO2 98%   BMI 25.85 kg/m²       Review of Systems   Constitutional: Negative for appetite change, fatigue and unexpected weight change.   HENT: Negative for congestion, ear pain, postnasal drip, rhinorrhea, sinus pressure, sneezing, sore throat, tinnitus, trouble swallowing and voice change.  "   Eyes: Negative for pain, discharge, redness, itching and visual disturbance.   Respiratory: Negative for cough, chest tightness, shortness of breath and wheezing.    Cardiovascular: Negative for chest pain, palpitations and leg swelling.   Gastrointestinal: Positive for nausea. Negative for abdominal distention, abdominal pain, blood in stool, constipation, diarrhea and vomiting.        No reflux.   Genitourinary: Negative for difficulty urinating, dyspareunia, flank pain, menstrual problem and pelvic pain.   Musculoskeletal: Negative for arthralgias, back pain, myalgias and neck stiffness.   Skin: Negative for color change and rash.   Neurological: Positive for dizziness. Negative for headaches.   Psychiatric/Behavioral: Negative for confusion and sleep disturbance. The patient is not nervous/anxious.       Objective:     Physical Exam   Constitutional: She is oriented to person, place, and time. She appears well-developed and well-nourished. No distress.   Eyes: Conjunctivae are normal. Pupils are equal, round, and reactive to light.   Cardiovascular: Normal rate and regular rhythm.    Pulmonary/Chest: Effort normal and breath sounds normal.   Musculoskeletal:   Normal gait   Neurological: She is alert and oriented to person, place, and time. She displays normal reflexes. No cranial nerve deficit or sensory deficit. Coordination normal.   Skin: Skin is warm and dry.   Left hand cellulitis is improved from last visit with me, no edema, very mild redness, is able to move hand better   Psychiatric: She has a normal mood and affect. Her behavior is normal.     Assessment:      1. Vasovagal episode    2. Cellulitis, unspecified cellulitis site      Plan:   Vasovagal episode    Cellulitis, unspecified cellulitis site    Other orders  -     ondansetron (ZOFRAN-ODT) 4 MG TbDL; Take 1 tablet (4 mg total) by mouth once.  Dispense: 20 tablet; Refill: 0  -     meclizine (ANTIVERT) 25 mg tablet; Take 1 tablet (25 mg total)  by mouth 3 (three) times daily as needed.  Dispense: 60 tablet; Refill: 0    will try to use the zofran to decrease vomiting, increase fluid intake.  Can use the antivert if dizzy.  To ER for any worse symptoms      Current Outpatient Prescriptions:     ALPRAZolam (XANAX) 0.25 MG tablet, Take 1 tablet (0.25 mg total) by mouth 3 (three) times daily as needed., Disp: 90 tablet, Rfl: 1    AMITIZA 24 mcg Cap, TAKE 1 CAPSULE BY MOUTH DAILY WITH MEALS, Disp: 90 capsule, Rfl: 3    amlodipine (NORVASC) 2.5 MG tablet, Take 2.5 mg by mouth once daily., Disp: , Rfl: 5    aspirin (ECOTRIN) 81 MG EC tablet, Take 81 mg by mouth., Disp: , Rfl:     atorvastatin (LIPITOR) 80 MG tablet, Take 1 tablet (80 mg total) by mouth once daily., Disp: 90 tablet, Rfl: 3    blood sugar diagnostic Strp, Check blood sugar (4) times daily, Disp: , Rfl:     blood-glucose meter (CLEVER CHOICE BLOOD GLUC SYS) [facility], , Disp: , Rfl:     buPROPion (WELLBUTRIN XL) 300 MG 24 hr tablet, Take 1 tablet (300 mg total) by mouth once daily., Disp: 90 tablet, Rfl: 3    carbidopa-levodopa  mg (SINEMET)  mg per tablet, Take 2 tablets by mouth 4 (four) times daily. Takes 2 1/2 pills by mouth four times daily, Disp: , Rfl:     glimepiride (AMARYL) 4 MG tablet, Take 1 tablet (4 mg total) by mouth daily with breakfast., Disp: 90 tablet, Rfl: 3    insulin syringes, disposable, 1 mL Syrg, Inject into the skin., Disp: , Rfl:     lancets 28 gauge Misc, Inject 1 applicator into the skin., Disp: , Rfl:     lancing device (LANCING DEVICE WITH LANCETS) Misc, , Disp: , Rfl:     liraglutide 0.6 mg/0.1 mL, 18 mg/3 mL, subq PNIJ (VICTOZA 2-MADDY) 0.6 mg/0.1 mL (18 mg/3 mL) PnIj, Inject 1.2 mg into the skin once daily., Disp: 6 mL, Rfl: 11    lisinopril (PRINIVIL,ZESTRIL) 40 MG tablet, Take 1 tablet by mouth once daily., Disp: , Rfl:     metFORMIN (GLUCOPHAGE) 500 MG tablet, TAKE 1 TABLET (500 MG TOTAL) BY MOUTH 2 (TWO) TIMES DAILY WITH MEALS., Disp: 180  tablet, Rfl: 3    metoprolol succinate (TOPROL-XL) 25 MG 24 hr tablet, Take 1 tablet (25 mg total) by mouth once daily., Disp: 30 tablet, Rfl: 11    miconazole (MICOTIN) 2 % cream, Apply topically 2 (two) times daily., Disp: 57 g, Rfl: 3    nystatin (MYCOSTATIN) powder, Apply topically 2 (two) times daily., Disp: 60 g, Rfl: 6    pantoprazole (PROTONIX) 40 MG tablet, Take 1 tablet (40 mg total) by mouth once daily., Disp: 90 tablet, Rfl: 3    sulfamethoxazole-trimethoprim 800-160mg (BACTRIM DS) 800-160 mg Tab, Take 1 tablet by mouth 2 (two) times daily., Disp: 20 tablet, Rfl: 0    meclizine (ANTIVERT) 25 mg tablet, Take 1 tablet (25 mg total) by mouth 3 (three) times daily as needed., Disp: 60 tablet, Rfl: 0

## 2018-03-20 ENCOUNTER — TELEPHONE (OUTPATIENT)
Dept: INTERNAL MEDICINE | Facility: CLINIC | Age: 68
End: 2018-03-20

## 2018-03-20 DIAGNOSIS — R11.2 INTRACTABLE VOMITING WITH NAUSEA, UNSPECIFIED VOMITING TYPE: Primary | ICD-10-CM

## 2018-03-20 DIAGNOSIS — R11.2 NAUSEA AND VOMITING, INTRACTABILITY OF VOMITING NOT SPECIFIED, UNSPECIFIED VOMITING TYPE: Primary | ICD-10-CM

## 2018-03-20 NOTE — TELEPHONE ENCOUNTER
Pt daughter called in about mom nausea and vomiting , she stated the medication is working as long as she take it , But she shouldn't be taking it all the tiime she want to know what's the problem. Offered options from Master, [facility] patel , appt with Dr. Sauceda or Referral to gastro . Daughter wants referral to Gastro . Informed her that once the referral is placed I will call her to schedule . She verbalized understanding

## 2018-03-20 NOTE — TELEPHONE ENCOUNTER
----- Message from Laurie Ramirez sent at 3/20/2018  2:26 PM CDT -----  Contact: Katherin Archer/daughter [phone]  States that she needs to speak to nurse. States that pt was seen by Master Robles last week for nausea and vomiting and she still has the nausea and vomiting. Please call back at [phone]//thank you acc

## 2018-03-21 RX ORDER — BUPROPION HYDROCHLORIDE 300 MG/1
300 TABLET ORAL DAILY
Qty: 90 TABLET | Refills: 3 | Status: SHIPPED | OUTPATIENT
Start: 2018-03-21 | End: 2019-02-11 | Stop reason: SDUPTHER

## 2018-04-03 ENCOUNTER — PATIENT MESSAGE (OUTPATIENT)
Dept: GASTROENTEROLOGY | Facility: CLINIC | Age: 68
End: 2018-04-03

## 2018-04-10 ENCOUNTER — INITIAL CONSULT (OUTPATIENT)
Dept: GASTROENTEROLOGY | Facility: CLINIC | Age: 68
End: 2018-04-10
Payer: MEDICARE

## 2018-04-10 ENCOUNTER — TELEPHONE (OUTPATIENT)
Dept: INTERNAL MEDICINE | Facility: CLINIC | Age: 68
End: 2018-04-10

## 2018-04-10 VITALS
HEIGHT: 65 IN | SYSTOLIC BLOOD PRESSURE: 112 MMHG | BODY MASS INDEX: 24.98 KG/M2 | WEIGHT: 149.94 LBS | HEART RATE: 88 BPM | DIASTOLIC BLOOD PRESSURE: 72 MMHG

## 2018-04-10 DIAGNOSIS — R11.2 NON-INTRACTABLE VOMITING WITH NAUSEA, UNSPECIFIED VOMITING TYPE: Primary | ICD-10-CM

## 2018-04-10 DIAGNOSIS — R42 DIZZINESS: ICD-10-CM

## 2018-04-10 PROCEDURE — 99999 PR PBB SHADOW E&M-EST. PATIENT-LVL III: CPT | Mod: PBBFAC,,, | Performed by: NURSE PRACTITIONER

## 2018-04-10 PROCEDURE — 99213 OFFICE O/P EST LOW 20 MIN: CPT | Mod: PBBFAC,PO | Performed by: NURSE PRACTITIONER

## 2018-04-10 PROCEDURE — 99214 OFFICE O/P EST MOD 30 MIN: CPT | Mod: S$PBB,ICN,, | Performed by: NURSE PRACTITIONER

## 2018-04-10 NOTE — LETTER
April 10, 2018      Rob Sauceda MD  [address]  [address]  [place] [address]           [facility]  [address]  [place] [address]  Phone: [phone]  Fax: [phone]          Patient: Mari Mayorga   MR Number: [id number]   YOB: 1950   Date of Visit: 4/10/2018       Dear Dr. Rob Sauceda:    Thank you for referring Mari Mayorga to me for evaluation. Attached you will find relevant portions of my assessment and plan of care.    If you have questions, please do not hesitate to call me. I look forward to following Mari Mayorga along with you.    Sincerely,    Sobia Dickson, NP    Enclosure  CC:  No Recipients    If you would like to receive this communication electronically, please contact [email or web address] or [phone] to request more information on [email or web address] access.    For providers and/or their staff who would like to refer a patient to [facility], please contact us through our one-stop-shop provider referral line, [facility], at [phone].    If you feel you have received this communication in error or would no longer like to receive these types of communications, please e-mail [email or web address]

## 2018-04-10 NOTE — PROGRESS NOTES
Clinic Consult:  [facility] Consultation Note    Reason for Consult:  The primary encounter diagnosis was Non-intractable vomiting with nausea, unspecified vomiting type. A diagnosis of Dizziness was also pertinent to this visit.    PCP: Rob Sauceda   [address] / [place] [address]    HPI:  This is a 68 y.o. female here for evaluation of the above. She was referred to me by Dr. Sauceda. She desribes problems with dizziness, nausea, and occasional vomiting since January 2018. Per patient, nausea and vomiting would occur secondary to the dizziness.  She was recently hospitalized and admitted to a rehab facility for weakness, where a physical therapist did some maneuvers to help with vertigo. She reports significant improvement with dizziness and has resolved her nausea and vomiting. No current GI symptoms.     Review of Systems   Constitutional: Negative for fever, malaise/fatigue and weight loss.   HENT: Negative for sore throat.    Respiratory: Negative for cough and wheezing.    Cardiovascular: Negative for chest pain and palpitations.   Gastrointestinal: Positive for nausea and vomiting. Negative for abdominal pain, blood in stool, constipation, diarrhea, heartburn and melena.   Genitourinary: Negative for dysuria and frequency.   Musculoskeletal: Negative for back pain, joint pain, myalgias and neck pain.   Skin: Negative for itching and rash.   Neurological: Positive for dizziness. Negative for speech change, seizures, loss of consciousness and headaches.   Psychiatric/Behavioral: Negative for depression and substance abuse. The patient is not nervous/anxious.        Medical History:  has a past medical history of Breast cancer; CAD (coronary artery disease); Chronic constipation; GERD (gastroesophageal reflux disease); History of breast cancer; History of CVA (cerebrovascular accident); Hyperlipidemia associated with type 2 diabetes mellitus; Hypertension associated with  diabetes; Major depression, recurrent; Parkinson disease; and Type II diabetes mellitus with complication.    Surgical History:  has a past surgical history that includes Breast lumpectomy.    Family History: family history includes Diabetes in her mother..     Social History:  reports that she has quit smoking. She has never used smokeless tobacco. She reports that she does not drink alcohol or use drugs.    Allergies: Reviewed    Home Medications:   Current Outpatient Prescriptions on File Prior to Visit   Medication Sig Dispense Refill    ALPRAZolam (XANAX) 0.25 MG tablet Take 1 tablet (0.25 mg total) by mouth 3 (three) times daily as needed. 90 tablet 1    AMITIZA 24 mcg Cap TAKE 1 CAPSULE BY MOUTH DAILY WITH MEALS 90 capsule 3    amlodipine (NORVASC) 2.5 MG tablet Take 2.5 mg by mouth 2 (two) times daily.   5    aspirin (ECOTRIN) 81 MG EC tablet Take 81 mg by mouth.      blood sugar diagnostic Strp Check blood sugar (4) times daily      blood-glucose meter (CLEVER CHOICE BLOOD GLUC SYS) Misc       buPROPion (WELLBUTRIN XL) 300 MG 24 hr tablet Take 1 tablet (300 mg total) by mouth once daily. 90 tablet 3    carbidopa-levodopa  mg (SINEMET)  mg per tablet Take 2 tablets by mouth 4 (four) times daily. Takes 2 1/2 pills by mouth four times daily      glimepiride (AMARYL) 4 MG tablet Take 1 tablet (4 mg total) by mouth daily with breakfast. 90 tablet 3    insulin syringes, disposable, 1 mL Syrg Inject into the skin.      lancets 28 gauge Misc Inject 1 applicator into the skin.      lancing device (LANCING DEVICE WITH LANCETS) Misc       liraglutide 0.6 mg/0.1 mL, 18 mg/3 mL, subq PNIJ (VICTOZA 2-MADDY) 0.6 mg/0.1 mL (18 mg/3 mL) PnIj Inject 1.2 mg into the skin once daily. 6 mL 11    lisinopril (PRINIVIL,ZESTRIL) 40 MG tablet Take 1 tablet by mouth once daily.      miconazole (MICOTIN) 2 % cream Apply topically 2 (two) times daily. 57 g 3    nystatin (MYCOSTATIN) powder Apply topically 2  "(two) times daily. 60 g 6    pantoprazole (PROTONIX) 40 MG tablet Take 1 tablet (40 mg total) by mouth once daily. 90 tablet 3    atorvastatin (LIPITOR) 80 MG tablet Take 1 tablet (80 mg total) by mouth once daily. 90 tablet 3    meclizine (ANTIVERT) 25 mg tablet Take 1 tablet (25 mg total) by mouth 3 (three) times daily as needed. 60 tablet 0    metFORMIN (GLUCOPHAGE) 500 MG tablet TAKE 1 TABLET (500 MG TOTAL) BY MOUTH 2 (TWO) TIMES DAILY WITH MEALS. 180 tablet 3    metoprolol succinate (TOPROL-XL) 25 MG 24 hr tablet Take 1 tablet (25 mg total) by mouth once daily. 30 tablet 11    sulfamethoxazole-trimethoprim 800-160mg (BACTRIM DS) 800-160 mg Tab Take 1 tablet by mouth 2 (two) times daily. 20 tablet 0     No current facility-administered medications on file prior to visit.        Physical Exam:  /72   Pulse 88   Ht 5' 4.8" (1.646 m)   Wt 68 kg (149 lb 14.6 oz)   BMI 25.10 kg/m²   Body mass index is 25.1 kg/m².  Physical Exam   Constitutional: She is oriented to person, place, and time and well-developed, well-nourished, and in no distress. No distress.   HENT:   Head: Normocephalic.   Eyes: Conjunctivae are normal. Pupils are equal, round, and reactive to light.   Cardiovascular: Normal rate, regular rhythm and normal heart sounds.    Pulmonary/Chest: Effort normal and breath sounds normal. No respiratory distress.   Abdominal: Soft. Bowel sounds are normal. She exhibits no distension. There is no tenderness.   Neurological: She is alert and oriented to person, place, and time. No cranial nerve deficit.   Skin: Skin is warm and dry. No rash noted.   Psychiatric: Mood and affect normal.       Labs: Pertinent labs reviewed.    Assessment:  1. Non-intractable vomiting with nausea, unspecified vomiting type    2. Dizziness         Recommendations:  - nausea and vomiting seems related to dizziness and not from a GI cause  - symptoms have improved with improvement in dizziness  - no further GI workup " needed at this time.   - she will notify me if develops any nausea and vomiting without dizziness.     Follow-up if symptoms worsen or fail to improve.    Thank you so much for allowing me to participate in the care of LINDA Cheng

## 2018-04-10 NOTE — TELEPHONE ENCOUNTER
----- Message from Brii Agarwal sent at 4/10/2018 10:32 AM CDT -----  Contact: Katherin/pt daughter   Call caller regarding some questions about pt meds.     ...[phone]

## 2018-04-12 ENCOUNTER — HOSPITAL ENCOUNTER (EMERGENCY)
Facility: HOSPITAL | Age: 68
Discharge: HOME | End: 2018-04-12
Payer: MEDICARE

## 2018-04-12 VITALS
DIASTOLIC BLOOD PRESSURE: 79 MMHG | RESPIRATION RATE: 20 BRPM | HEIGHT: 64 IN | SYSTOLIC BLOOD PRESSURE: 127 MMHG | TEMPERATURE: 98 F | WEIGHT: 150 LBS | HEART RATE: 102 BPM | BODY MASS INDEX: 25.61 KG/M2 | OXYGEN SATURATION: 99 %

## 2018-04-12 DIAGNOSIS — K59.00 CONSTIPATION: ICD-10-CM

## 2018-04-12 DIAGNOSIS — K59.00 CONSTIPATION, UNSPECIFIED CONSTIPATION TYPE: Primary | ICD-10-CM

## 2018-04-12 PROCEDURE — 99283 EMERGENCY DEPT VISIT LOW MDM: CPT | Mod: 25

## 2018-04-12 PROCEDURE — 25000003 PHARM REV CODE 250: Performed by: PHYSICIAN ASSISTANT

## 2018-04-12 RX ORDER — SYRING-NEEDL,DISP,INSUL,0.3 ML 29 G X1/2"
296 SYRINGE, EMPTY DISPOSABLE MISCELLANEOUS
Status: COMPLETED | OUTPATIENT
Start: 2018-04-12 | End: 2018-04-12

## 2018-04-12 RX ORDER — PSEUDOEPHEDRINE/ACETAMINOPHEN 30MG-500MG
100 TABLET ORAL
Status: COMPLETED | OUTPATIENT
Start: 2018-04-12 | End: 2018-04-12

## 2018-04-12 RX ORDER — POLYETHYLENE GLYCOL 3350, SODIUM SULFATE ANHYDROUS, SODIUM BICARBONATE, SODIUM CHLORIDE, POTASSIUM CHLORIDE 236; 22.74; 6.74; 5.86; 2.97 G/4L; G/4L; G/4L; G/4L; G/4L
240 POWDER, FOR SOLUTION ORAL ONCE
Qty: 240 ML | Refills: 0 | Status: SHIPPED | OUTPATIENT
Start: 2018-04-12 | End: 2018-04-12

## 2018-04-12 RX ADMIN — Medication 100 ML: at 12:04

## 2018-04-12 RX ADMIN — MAGNESIUM CITRATE 296 ML: 1.75 LIQUID ORAL at 12:04

## 2018-04-12 RX ADMIN — SODIUM CHLORIDE 500 ML: 0.9 INJECTION, SOLUTION INTRAVENOUS at 12:04

## 2018-04-12 NOTE — ED PROVIDER NOTES
Encounter Date: 4/12/2018       History     Chief Complaint   Patient presents with    Constipation     + consitaption on and off for 2 years      The history is provided by the patient.   Constipation    The current episode started several weeks ago. The problem occurs continuously. The problem has been unchanged. The pain is at a severity of 3/10. The stool is described as hard. There was no prior successful therapy. Prior unsuccessful therapies include enemas. Associated symptoms include hemorrhoids and rectal pain. Pertinent negatives include no anorexia, no fever, no abdominal pain, no diarrhea, no hematemesis, no nausea, no vomiting, no hematuria, no vaginal bleeding, no vaginal discharge, no chest pain, no headaches, no coughing, no difficulty breathing and no rash.     Review of patient's allergies indicates:   Allergen Reactions    Morphine Hives     Past Medical History:   Diagnosis Date    Breast cancer     CAD (coronary artery disease)     s/p stent 2016    Chronic constipation     GERD (gastroesophageal reflux disease)     History of breast cancer     bilateral breast, XRT both times, no ctx    History of CVA (cerebrovascular accident)     Hyperlipidemia associated with type 2 diabetes mellitus     Hypertension associated with diabetes     Major depression, recurrent     Parkinson disease     Type II diabetes mellitus with complication      Past Surgical History:   Procedure Laterality Date    ADENOIDECTOMY      BACK SURGERY      BREAST LUMPECTOMY      CHOLECYSTECTOMY      HYSTERECTOMY      TONSILLECTOMY       Family History   Problem Relation Age of Onset    Diabetes Mother      Social History   Substance Use Topics    Smoking status: Former Smoker    Smokeless tobacco: Never Used    Alcohol use No     Review of Systems   Constitutional: Negative for chills and fever.   HENT: Negative for sore throat.    Eyes: Negative for photophobia and redness.   Respiratory: Negative for  cough and shortness of breath.    Cardiovascular: Negative for chest pain.   Gastrointestinal: Positive for constipation, hemorrhoids and rectal pain. Negative for abdominal pain, anorexia, diarrhea, hematemesis, nausea and vomiting.   Endocrine: Negative for polydipsia and polyphagia.   Genitourinary: Negative for dysuria, hematuria, vaginal bleeding and vaginal discharge.   Musculoskeletal: Negative for arthralgias, back pain and myalgias.   Skin: Negative for rash.   Neurological: Negative for weakness and headaches.   Hematological: Does not bruise/bleed easily.   Psychiatric/Behavioral: The patient is not nervous/anxious.    All other systems reviewed and are negative.      Physical Exam     Initial Vitals [04/12/18 1022]   BP Pulse Resp Temp SpO2   127/79 102 20 98.3 °F (36.8 °C) 99 %      MAP       95         Physical Exam    Nursing note and vitals reviewed.  Constitutional: Vital signs are normal. She appears well-developed and well-nourished. No distress.   HENT:   Head: Normocephalic and atraumatic.   Right Ear: External ear normal.   Left Ear: External ear normal.   Nose: Nose normal.   Mouth/Throat: Oropharynx is clear and moist.   Eyes: Conjunctivae, EOM and lids are normal. Pupils are equal, round, and reactive to light.   Neck: Normal range of motion and full passive range of motion without pain. Neck supple.   Cardiovascular: Normal rate, regular rhythm, S1 normal, S2 normal, normal heart sounds, intact distal pulses and normal pulses.   Pulmonary/Chest: Breath sounds normal. No respiratory distress. She has no wheezes. She has no rales.   Abdominal: Soft. Normal appearance and bowel sounds are normal. She exhibits no distension. There is no tenderness.   Musculoskeletal: Normal range of motion.   Lymphadenopathy:     She has no cervical adenopathy.   Neurological: She is alert and oriented to person, place, and time. She has normal strength. No cranial nerve deficit or sensory deficit. Coordination  and gait normal.   Skin: Skin is warm, dry and intact.   Psychiatric: She has a normal mood and affect. Her speech is normal and behavior is normal. Judgment and thought content normal. Cognition and memory are normal.         ED Course   Procedures  Labs Reviewed - No data to display                               Clinical Impression:   The primary encounter diagnosis was Constipation, unspecified constipation type. A diagnosis of Constipation was also pertinent to this visit.    Disposition:   Disposition: Discharged  Condition: Stable                        CHAVEZ Richmond  04/12/18 4204

## 2018-04-12 NOTE — ED NOTES
"approx 10 walnut sized pieces of stool were digitally removed after enema pt was assisted to BSC . She also expelled approx 2 cups of hard stool. Pt states "I feel like something is moving" pt was cleaned of stool and an adult diaper in place "in case I have an accident on the way home" Pt was very grateful.  "

## 2018-04-13 ENCOUNTER — PES CALL (OUTPATIENT)
Dept: ADMINISTRATIVE | Facility: CLINIC | Age: 68
End: 2018-04-13

## 2018-05-04 ENCOUNTER — OUTPATIENT CASE MANAGEMENT (OUTPATIENT)
Dept: ADMINISTRATIVE | Facility: OTHER | Age: 68
End: 2018-05-04

## 2018-05-05 NOTE — PROGRESS NOTES
Thank you for the referral. The following patient has been assigned to Sedrick Eduardo RN with Outpatient Complex Care Management for high risk screening.    Reason for referral: Br Opportunity    Please contact [facility] at [phone] with any questions.    Thank you,    Karis Lawton, [facility] [place]  [facility]  Ext [phone]

## 2018-05-14 ENCOUNTER — OUTPATIENT CASE MANAGEMENT (OUTPATIENT)
Dept: ADMINISTRATIVE | Facility: OTHER | Age: 68
End: 2018-05-14

## 2018-05-14 NOTE — PROGRESS NOTES
"5/14/18-RN BRAXTON called and spoke with Ms. Mayorga in attempt to screen for OPCM. RN CM explained to patient purpose of OPCM. Declined need for assistance at this time. Patient stated that she is currently receiving assistance from "[facility] RN will send patient contact information for OOC and OPCM. Encouraged patient to contact OPCM in the event that needs develop. Verbalized understanding. Will close case at this time  "

## 2018-05-18 ENCOUNTER — TELEPHONE (OUTPATIENT)
Dept: INTERNAL MEDICINE | Facility: CLINIC | Age: 68
End: 2018-05-18

## 2018-05-18 NOTE — TELEPHONE ENCOUNTER
----- Message from Nataliia Naik sent at 5/18/2018 11:26 AM CDT -----  Contact: mark - phys therapist  States pt fell on Monday and has bruising on right knee and elbow and has some pain in right hip and can be reached at [phone]//thanks/dbw

## 2018-05-25 ENCOUNTER — HOSPITAL ENCOUNTER (OUTPATIENT)
Dept: RADIOLOGY | Facility: HOSPITAL | Age: 68
Discharge: HOME | End: 2018-05-25
Attending: INTERNAL MEDICINE
Payer: MEDICARE

## 2018-05-25 ENCOUNTER — OFFICE VISIT (OUTPATIENT)
Dept: INTERNAL MEDICINE | Facility: CLINIC | Age: 68
End: 2018-05-25
Payer: MEDICARE

## 2018-05-25 VITALS
WEIGHT: 153.44 LBS | TEMPERATURE: 97 F | DIASTOLIC BLOOD PRESSURE: 70 MMHG | HEART RATE: 84 BPM | SYSTOLIC BLOOD PRESSURE: 110 MMHG | RESPIRATION RATE: 16 BRPM | BODY MASS INDEX: 26.2 KG/M2 | HEIGHT: 64 IN | OXYGEN SATURATION: 98 %

## 2018-05-25 DIAGNOSIS — M25.551 PAIN OF RIGHT HIP JOINT: ICD-10-CM

## 2018-05-25 DIAGNOSIS — M25.551 PAIN OF RIGHT HIP JOINT: Primary | ICD-10-CM

## 2018-05-25 DIAGNOSIS — M70.61 TROCHANTERIC BURSITIS, RIGHT HIP: ICD-10-CM

## 2018-05-25 PROCEDURE — 73502 X-RAY EXAM HIP UNI 2-3 VIEWS: CPT | Mod: 26,RT,, | Performed by: RADIOLOGY

## 2018-05-25 PROCEDURE — 20610 DRAIN/INJ JOINT/BURSA W/O US: CPT | Mod: PBBFAC,PO | Performed by: INTERNAL MEDICINE

## 2018-05-25 PROCEDURE — 99213 OFFICE O/P EST LOW 20 MIN: CPT | Mod: PBBFAC,25,PO | Performed by: INTERNAL MEDICINE

## 2018-05-25 PROCEDURE — 20610 DRAIN/INJ JOINT/BURSA W/O US: CPT | Mod: S$PBB,RT,, | Performed by: INTERNAL MEDICINE

## 2018-05-25 PROCEDURE — 99213 OFFICE O/P EST LOW 20 MIN: CPT | Mod: 25,S$PBB,, | Performed by: INTERNAL MEDICINE

## 2018-05-25 PROCEDURE — 99999 PR PBB SHADOW E&M-EST. PATIENT-LVL III: CPT | Mod: PBBFAC,,, | Performed by: INTERNAL MEDICINE

## 2018-05-25 PROCEDURE — 73502 X-RAY EXAM HIP UNI 2-3 VIEWS: CPT | Mod: TC,PO,RT

## 2018-05-25 RX ORDER — METHYLPREDNISOLONE ACETATE 80 MG/ML
80 INJECTION, SUSPENSION INTRA-ARTICULAR; INTRALESIONAL; INTRAMUSCULAR; SOFT TISSUE
Status: COMPLETED | OUTPATIENT
Start: 2018-05-25 | End: 2018-05-25

## 2018-05-25 RX ORDER — ALPRAZOLAM 0.25 MG/1
TABLET ORAL
Qty: 90 TABLET | Refills: 5 | Status: SHIPPED | OUTPATIENT
Start: 2018-05-25 | End: 2018-12-18 | Stop reason: SDUPTHER

## 2018-05-25 RX ORDER — MELOXICAM 15 MG/1
15 TABLET ORAL DAILY
Qty: 30 TABLET | Refills: 11 | Status: SHIPPED | OUTPATIENT
Start: 2018-05-25 | End: 2018-10-03

## 2018-05-25 RX ADMIN — METHYLPREDNISOLONE ACETATE 80 MG: 80 INJECTION, SUSPENSION INTRALESIONAL; INTRAMUSCULAR; INTRASYNOVIAL; SOFT TISSUE at 09:05

## 2018-05-25 NOTE — PROGRESS NOTES
"HPI:  Patient is a 68-year-old female who comes today for complaints of pain in the right hip for last several months.  He has been progressive.  She states it is worse with standing.  She denies any trauma or injury to the hip.  She has had some falls but none that caused any significant trauma to the hip    Current meds have been verified and updated per the EMR  Exam:/70   Pulse 84   Temp 96.6 °F (35.9 °C)   Resp 16   Ht 5' 4" (1.626 m)   Wt 69.6 kg (153 lb 7 oz)   SpO2 98%   BMI 26.34 kg/m²   She has a extreme tenderness just on palpation other greater trochanteric bursa.  There is no warmth erythema.  She has pain with external rotation of the hip and mildly with flexion of the hip.    Lab Results   Component Value Date    WBC 9.53 03/12/2018    HGB 14.6 03/12/2018    HCT 42.3 03/12/2018     03/12/2018    CHOL 117 (L) 11/08/2017    TRIG 98 11/08/2017    HDL 52 11/08/2017    ALT 8 (L) 03/12/2018    AST 15 03/12/2018     03/12/2018    K 3.6 03/12/2018     03/12/2018    CREATININE 0.7 03/12/2018    BUN 13 03/12/2018    CO2 26 03/12/2018    TSH 2.007 02/22/2018    HGBA1C 6.8 (H) 02/22/2018       Impression:  Right hip pain, suspect most of it is bursitis  Patient Active Problem List   Diagnosis    Type II diabetes mellitus with complication    Hypertension associated with diabetes    Hyperlipidemia associated with type 2 diabetes mellitus    Parkinson disease    CAD (coronary artery disease)    GERD (gastroesophageal reflux disease)    Chronic constipation    History of breast cancer    History of CVA (cerebrovascular accident)    Major depression, recurrent       Plan:  Orders Placed This Encounter    X-Ray Hip 2 View Right    meloxicam (MOBIC) 15 MG tablet    methylPREDNISolone acetate injection 80 mg     The right greater trochanteric bursa was injected with 2 cc of Marcaine and 1 cc of Depo-Medrol 80.  She also had x-rays of the hip done today.  She was placed on " meloxicam

## 2018-06-07 ENCOUNTER — TELEPHONE (OUTPATIENT)
Dept: INTERNAL MEDICINE | Facility: CLINIC | Age: 68
End: 2018-06-07

## 2018-06-07 NOTE — TELEPHONE ENCOUNTER
Called pt she is needing a new prescription for Lorazepam.  She reports that it has been a while since she has taken the medication.  To be filled by [facility]. [place] 3/19/18 [place] 9/11/18

## 2018-06-08 RX ORDER — LORAZEPAM 0.5 MG/1
0.5 TABLET ORAL
Qty: 90 TABLET | Refills: 3 | Status: SHIPPED | OUTPATIENT
Start: 2018-06-08 | End: 2018-07-27

## 2018-07-27 ENCOUNTER — OFFICE VISIT (OUTPATIENT)
Dept: INTERNAL MEDICINE | Facility: CLINIC | Age: 68
End: 2018-07-27
Payer: MEDICARE

## 2018-07-27 ENCOUNTER — HOSPITAL ENCOUNTER (OUTPATIENT)
Dept: RADIOLOGY | Facility: HOSPITAL | Age: 68
Discharge: HOME | End: 2018-07-27
Attending: FAMILY MEDICINE
Payer: MEDICARE

## 2018-07-27 VITALS
HEIGHT: 64 IN | DIASTOLIC BLOOD PRESSURE: 68 MMHG | OXYGEN SATURATION: 97 % | SYSTOLIC BLOOD PRESSURE: 118 MMHG | BODY MASS INDEX: 27.59 KG/M2 | WEIGHT: 161.63 LBS | HEART RATE: 98 BPM | RESPIRATION RATE: 18 BRPM | TEMPERATURE: 98 F

## 2018-07-27 DIAGNOSIS — M23.91 LIGAMENTOUS LAXITY OF RIGHT KNEE: ICD-10-CM

## 2018-07-27 DIAGNOSIS — M25.561 ACUTE PAIN OF RIGHT KNEE: ICD-10-CM

## 2018-07-27 DIAGNOSIS — M25.561 ACUTE PAIN OF RIGHT KNEE: Primary | ICD-10-CM

## 2018-07-27 PROCEDURE — 99999 PR PBB SHADOW E&M-EST. PATIENT-LVL IV: CPT | Mod: PBBFAC,,, | Performed by: FAMILY MEDICINE

## 2018-07-27 PROCEDURE — 73560 X-RAY EXAM OF KNEE 1 OR 2: CPT | Mod: 26,XS,LT, | Performed by: RADIOLOGY

## 2018-07-27 PROCEDURE — 73560 X-RAY EXAM OF KNEE 1 OR 2: CPT | Mod: TC,59,PO,LT

## 2018-07-27 PROCEDURE — 73562 X-RAY EXAM OF KNEE 3: CPT | Mod: 26,RT,, | Performed by: RADIOLOGY

## 2018-07-27 PROCEDURE — 99213 OFFICE O/P EST LOW 20 MIN: CPT | Mod: S$PBB,,, | Performed by: FAMILY MEDICINE

## 2018-07-27 PROCEDURE — 99214 OFFICE O/P EST MOD 30 MIN: CPT | Mod: PBBFAC,25,PO | Performed by: FAMILY MEDICINE

## 2018-07-27 PROCEDURE — 73562 X-RAY EXAM OF KNEE 3: CPT | Mod: TC,PO,RT

## 2018-07-27 NOTE — PROGRESS NOTES
"Subjective:       Patient ID: Mari Mayorga is a 68 y.o. female.    Chief Complaint: Knee Pain      Patient reports for about 2 weeks now, she has felt like her right knee will give way. She feels very unstable when walking, uses cane cautiously. No injury or trauma to knee, has never had problems with either of her knees before.      Knee Pain        Review of Systems   Constitutional: Negative for fever.   Musculoskeletal: Positive for arthralgias and gait problem. Negative for joint swelling.   Skin: Negative for color change and rash.     Past Medical History:   Diagnosis Date    Breast cancer     CAD (coronary artery disease)     s/p stent 2016    Chronic constipation     GERD (gastroesophageal reflux disease)     History of breast cancer     bilateral breast, XRT both times, no ctx    History of CVA (cerebrovascular accident)     Hyperlipidemia associated with type 2 diabetes mellitus     Hypertension associated with diabetes     Major depression, recurrent     Parkinson disease     Type II diabetes mellitus with complication      Past Surgical History:   Procedure Laterality Date    ADENOIDECTOMY      BACK SURGERY      BREAST LUMPECTOMY      CHOLECYSTECTOMY      HYSTERECTOMY      TONSILLECTOMY       Family History   Problem Relation Age of Onset    Diabetes Mother      Social History     Social History    Marital status:      Spouse name: N/A    Number of children: N/A    Years of education: N/A     Occupational History    Not on file.     Social History Main Topics    Smoking status: Former Smoker    Smokeless tobacco: Never Used    Alcohol use No    Drug use: No    Sexual activity: No     Other Topics Concern    Not on file     Social History Narrative    No narrative on file     Review of patient's allergies indicates:   Allergen Reactions    Morphine Hives       Objective:       /68   Pulse 98   Temp 98.3 °F (36.8 °C)   Resp 18   Ht 5' 4" (1.626 m)   " Wt 73.3 kg (161 lb 9.6 oz)   SpO2 97%   BMI 27.74 kg/m²   Physical Exam   Constitutional: She appears well-developed and well-nourished. No distress.   Using cane   Musculoskeletal: Normal range of motion. She exhibits tenderness. She exhibits no edema.        Right knee: She exhibits MCL laxity. She exhibits no swelling, no erythema and no LCL laxity. Tenderness found.   Skin: She is not diaphoretic.   Vitals reviewed.    Assessment:     1. Acute pain of right knee    2. Ligamentous laxity of right knee      Plan:   Acute pain of right knee  Ligamentous laxity of right knee  -      X-Ray Knee 3 View Right; Future; Expected date: 07/27/2018 - degenerative changes  -     Ambulatory referral to Home Health - PT  -     MRI Knee Without Contrast Right; Future; Expected date: 07/27/2018    Recommended that she wear knee brace for extra stability, resume meloxicam prescribed by Dr. Sauceda at her last visit.  Medication List with Changes/Refills   Current Medications    ALPRAZOLAM (XANAX) 0.25 MG TABLET    TAKE 1 TABLET BY MOUTH 3  TIMES DAILY AS NEEDED    AMITIZA 24 MCG CAP    TAKE 1 CAPSULE BY MOUTH DAILY WITH MEALS    AMLODIPINE (NORVASC) 2.5 MG TABLET    Take 2.5 mg by mouth 2 (two) times daily.     ASPIRIN (ECOTRIN) 81 MG EC TABLET    Take 81 mg by mouth.    BLOOD SUGAR DIAGNOSTIC STRP    Check blood sugar (4) times daily    BLOOD-GLUCOSE METER (CLEVER CHOICE BLOOD GLUC SYS) MISC        BUPROPION (WELLBUTRIN XL) 300 MG 24 HR TABLET    Take 1 tablet (300 mg total) by mouth once daily.    CARBIDOPA-LEVODOPA  MG (SINEMET)  MG PER TABLET    Take 2 tablets by mouth 4 (four) times daily. Takes 2 1/2 pills by mouth four times daily    GLIMEPIRIDE (AMARYL) 4 MG TABLET    Take 1 tablet (4 mg total) by mouth daily with breakfast.    INSULIN SYRINGES, DISPOSABLE, 1 ML SYRG    Inject into the skin.    LANCETS 28 GAUGE MISC    Inject 1 applicator into the skin.    LANCING DEVICE (LANCING DEVICE WITH LANCETS) MISC         LIRAGLUTIDE 0.6 MG/0.1 ML, 18 MG/3 ML, SUBQ PNIJ (VICTOZA 2-MADDY) 0.6 MG/0.1 ML (18 MG/3 ML) PNIJ    Inject 1.2 mg into the skin once daily.    LISINOPRIL (PRINIVIL,ZESTRIL) 40 MG TABLET    Take 1 tablet by mouth once daily.    MECLIZINE (ANTIVERT) 25 MG TABLET    Take 1 tablet (25 mg total) by mouth 3 (three) times daily as needed.    MELOXICAM (MOBIC) 15 MG TABLET    Take 1 tablet (15 mg total) by mouth once daily.    METFORMIN (GLUCOPHAGE) 500 MG TABLET    TAKE 1 TABLET (500 MG TOTAL) BY MOUTH 2 (TWO) TIMES DAILY WITH MEALS.    METOPROLOL SUCCINATE (TOPROL-XL) 25 MG 24 HR TABLET    Take 1 tablet (25 mg total) by mouth once daily.    MICONAZOLE (MICOTIN) 2 % CREAM    Apply topically 2 (two) times daily.    NYSTATIN (MYCOSTATIN) POWDER    Apply topically 2 (two) times daily.    PANTOPRAZOLE (PROTONIX) 40 MG TABLET    Take 1 tablet (40 mg total) by mouth once daily.   Discontinued Medications    LORAZEPAM (ATIVAN) 0.5 MG TABLET    Take 1 tablet (0.5 mg total) by mouth as needed for Anxiety.

## 2018-08-02 ENCOUNTER — TELEPHONE (OUTPATIENT)
Dept: INTERNAL MEDICINE | Facility: CLINIC | Age: 68
End: 2018-08-02

## 2018-08-02 ENCOUNTER — HOSPITAL ENCOUNTER (OUTPATIENT)
Dept: RADIOLOGY | Facility: HOSPITAL | Age: 68
Discharge: HOME | End: 2018-08-02
Attending: FAMILY MEDICINE
Payer: MEDICARE

## 2018-08-02 DIAGNOSIS — M23.91 LIGAMENTOUS LAXITY OF RIGHT KNEE: ICD-10-CM

## 2018-08-02 DIAGNOSIS — M25.561 ACUTE PAIN OF RIGHT KNEE: ICD-10-CM

## 2018-08-02 PROCEDURE — 73721 MRI JNT OF LWR EXTRE W/O DYE: CPT | Mod: TC,RT

## 2018-08-02 NOTE — TELEPHONE ENCOUNTER
----- Message from Yesica Holguin MD sent at 8/2/2018 12:00 PM CDT -----  Please let her know that her MRI did not show any tears. IT was normal. I would recommend that she continue PT to help build her knee strength up, continue wearing the knee sleeve/brace for extra support.

## 2018-08-10 ENCOUNTER — TELEPHONE (OUTPATIENT)
Dept: INTERNAL MEDICINE | Facility: CLINIC | Age: 68
End: 2018-08-10

## 2018-09-04 ENCOUNTER — LAB VISIT (OUTPATIENT)
Dept: LAB | Facility: HOSPITAL | Age: 68
End: 2018-09-04
Attending: INTERNAL MEDICINE
Payer: MEDICARE

## 2018-09-04 DIAGNOSIS — E11.8 TYPE 2 DIABETES MELLITUS WITH COMPLICATION, WITH LONG-TERM CURRENT USE OF INSULIN: ICD-10-CM

## 2018-09-04 DIAGNOSIS — Z79.4 TYPE 2 DIABETES MELLITUS WITH COMPLICATION, WITH LONG-TERM CURRENT USE OF INSULIN: ICD-10-CM

## 2018-09-04 LAB
ALBUMIN SERPL BCP-MCNC: 3.5 G/DL
ALP SERPL-CCNC: 65 U/L
ALT SERPL W/O P-5'-P-CCNC: 16 U/L
ANION GAP SERPL CALC-SCNC: 10 MMOL/L
AST SERPL-CCNC: 13 U/L
BASOPHILS # BLD AUTO: 0.05 K/UL
BASOPHILS NFR BLD: 0.8 %
BILIRUB SERPL-MCNC: 0.8 MG/DL
BUN SERPL-MCNC: 26 MG/DL
CALCIUM SERPL-MCNC: 9.8 MG/DL
CHLORIDE SERPL-SCNC: 103 MMOL/L
CHOLEST SERPL-MCNC: 175 MG/DL
CHOLEST/HDLC SERPL: 3.2 {RATIO}
CO2 SERPL-SCNC: 27 MMOL/L
CREAT SERPL-MCNC: 1 MG/DL
DIFFERENTIAL METHOD: ABNORMAL
EOSINOPHIL # BLD AUTO: 0.2 K/UL
EOSINOPHIL NFR BLD: 3 %
ERYTHROCYTE [DISTWIDTH] IN BLOOD BY AUTOMATED COUNT: 12.6 %
EST. GFR  (AFRICAN AMERICAN): >60 ML/MIN/1.73 M^2
EST. GFR  (NON AFRICAN AMERICAN): 57.2 ML/MIN/1.73 M^2
ESTIMATED AVG GLUCOSE: 146 MG/DL
GLUCOSE SERPL-MCNC: 271 MG/DL
HBA1C MFR BLD HPLC: 6.7 %
HCT VFR BLD AUTO: 43.6 %
HDLC SERPL-MCNC: 55 MG/DL
HDLC SERPL: 31.4 %
HGB BLD-MCNC: 13.9 G/DL
IMM GRANULOCYTES # BLD AUTO: 0.01 K/UL
IMM GRANULOCYTES NFR BLD AUTO: 0.2 %
LDLC SERPL CALC-MCNC: 77.8 MG/DL
LYMPHOCYTES # BLD AUTO: 1.2 K/UL
LYMPHOCYTES NFR BLD: 18.1 %
MCH RBC QN AUTO: 30.2 PG
MCHC RBC AUTO-ENTMCNC: 31.9 G/DL
MCV RBC AUTO: 95 FL
MONOCYTES # BLD AUTO: 0.4 K/UL
MONOCYTES NFR BLD: 6.4 %
NEUTROPHILS # BLD AUTO: 4.6 K/UL
NEUTROPHILS NFR BLD: 71.5 %
NONHDLC SERPL-MCNC: 120 MG/DL
NRBC BLD-RTO: 0 /100 WBC
PLATELET # BLD AUTO: 302 K/UL
PMV BLD AUTO: 10.2 FL
POTASSIUM SERPL-SCNC: 4.7 MMOL/L
PROT SERPL-MCNC: 6.7 G/DL
RBC # BLD AUTO: 4.6 M/UL
SODIUM SERPL-SCNC: 140 MMOL/L
TRIGL SERPL-MCNC: 211 MG/DL
TSH SERPL DL<=0.005 MIU/L-ACNC: 2.37 UIU/ML
WBC # BLD AUTO: 6.36 K/UL

## 2018-09-04 PROCEDURE — 36415 COLL VENOUS BLD VENIPUNCTURE: CPT | Mod: PO

## 2018-09-04 PROCEDURE — 84443 ASSAY THYROID STIM HORMONE: CPT

## 2018-09-04 PROCEDURE — 85025 COMPLETE CBC W/AUTO DIFF WBC: CPT

## 2018-09-04 PROCEDURE — 80053 COMPREHEN METABOLIC PANEL: CPT

## 2018-09-04 PROCEDURE — 83036 HEMOGLOBIN GLYCOSYLATED A1C: CPT

## 2018-09-04 PROCEDURE — 80061 LIPID PANEL: CPT

## 2018-09-07 NOTE — TELEPHONE ENCOUNTER
This call is an FYI patient does not wish to be seen at this point. She fell going into her daughters home. Patient does have some bruising and slight pain in right hip. They will call if patient needs appointment.   21 yof 21 yof abd cramp, epigastric and hypogastric, which began yesterday morning, vomiting which resolved, now w/ nausea.  no prior abd surg.  no cp/sob.  no fc.

## 2018-09-11 ENCOUNTER — OFFICE VISIT (OUTPATIENT)
Dept: INTERNAL MEDICINE | Facility: CLINIC | Age: 68
End: 2018-09-11
Payer: MEDICARE

## 2018-09-11 VITALS
TEMPERATURE: 97 F | HEIGHT: 64 IN | HEART RATE: 84 BPM | SYSTOLIC BLOOD PRESSURE: 130 MMHG | WEIGHT: 161.81 LBS | OXYGEN SATURATION: 98 % | BODY MASS INDEX: 27.63 KG/M2 | DIASTOLIC BLOOD PRESSURE: 76 MMHG | RESPIRATION RATE: 16 BRPM

## 2018-09-11 DIAGNOSIS — K21.9 GASTROESOPHAGEAL REFLUX DISEASE, ESOPHAGITIS PRESENCE NOT SPECIFIED: ICD-10-CM

## 2018-09-11 DIAGNOSIS — Z12.39 SCREENING BREAST EXAMINATION: ICD-10-CM

## 2018-09-11 DIAGNOSIS — E11.69 HYPERLIPIDEMIA ASSOCIATED WITH TYPE 2 DIABETES MELLITUS: ICD-10-CM

## 2018-09-11 DIAGNOSIS — E11.59 HYPERTENSION ASSOCIATED WITH DIABETES: ICD-10-CM

## 2018-09-11 DIAGNOSIS — E78.5 HYPERLIPIDEMIA ASSOCIATED WITH TYPE 2 DIABETES MELLITUS: ICD-10-CM

## 2018-09-11 DIAGNOSIS — I25.10 CORONARY ARTERY DISEASE, ANGINA PRESENCE UNSPECIFIED, UNSPECIFIED VESSEL OR LESION TYPE, UNSPECIFIED WHETHER NATIVE OR TRANSPLANTED HEART: ICD-10-CM

## 2018-09-11 DIAGNOSIS — F33.1 MODERATE EPISODE OF RECURRENT MAJOR DEPRESSIVE DISORDER: ICD-10-CM

## 2018-09-11 DIAGNOSIS — I15.2 HYPERTENSION ASSOCIATED WITH DIABETES: ICD-10-CM

## 2018-09-11 DIAGNOSIS — Z79.4 TYPE 2 DIABETES MELLITUS WITH COMPLICATION, WITH LONG-TERM CURRENT USE OF INSULIN: Primary | ICD-10-CM

## 2018-09-11 DIAGNOSIS — Z86.73 HISTORY OF CVA (CEREBROVASCULAR ACCIDENT): ICD-10-CM

## 2018-09-11 DIAGNOSIS — Z85.3 HISTORY OF BREAST CANCER: ICD-10-CM

## 2018-09-11 DIAGNOSIS — G20.A1 PARKINSON DISEASE: ICD-10-CM

## 2018-09-11 DIAGNOSIS — E11.8 TYPE 2 DIABETES MELLITUS WITH COMPLICATION, WITH LONG-TERM CURRENT USE OF INSULIN: Primary | ICD-10-CM

## 2018-09-11 PROCEDURE — 99214 OFFICE O/P EST MOD 30 MIN: CPT | Mod: S$PBB,,, | Performed by: INTERNAL MEDICINE

## 2018-09-11 PROCEDURE — 99213 OFFICE O/P EST LOW 20 MIN: CPT | Mod: PBBFAC,PO | Performed by: INTERNAL MEDICINE

## 2018-09-11 PROCEDURE — 99999 PR PBB SHADOW E&M-EST. PATIENT-LVL III: CPT | Mod: PBBFAC,,, | Performed by: INTERNAL MEDICINE

## 2018-09-11 RX ORDER — AMLODIPINE BESYLATE 2.5 MG/1
2.5 TABLET ORAL 2 TIMES DAILY
COMMUNITY
Start: 2017-04-29 | End: 2019-03-25 | Stop reason: SDUPTHER

## 2018-09-11 RX ORDER — GLIMEPIRIDE 4 MG/1
4 TABLET ORAL
Qty: 90 TABLET | Refills: 3 | Status: SHIPPED | OUTPATIENT
Start: 2018-09-11 | End: 2019-06-21 | Stop reason: SDUPTHER

## 2018-09-11 NOTE — PROGRESS NOTES
"HPI:  Patient is a 68-year-old female who comes today for follow-up of her diabetes, hypertension, lipids, coronary artery disease, and her Parkinson's disease. Patient has been doing about the same.  There has been no major problems.  She is receiving physical therapy for her right knee.  Patient denies any hypoglycemic events.  Her fasting sugar in the morning is typically between 150-200.  She does not rub readily comply with her diet.  She does eat a lot of carbs.  She is faithful in taking the Victoza.  She is no longer taking any Lantus insulin.  Her Parkinson's disease is been doing fairly well.  There has been no significant progression.  Her blood pressure at home has been well controlled.    Current meds have been verified and updated per the EMR  Exam:/76   Pulse 84   Temp 97.4 °F (36.3 °C)   Resp 16   Ht 5' 4" (1.626 m)   Wt 73.4 kg (161 lb 13.1 oz)   SpO2 98%   BMI 27.78 kg/m²   Carotids 2+ equal without bruits  Chest clear  Cardiovascular regular rate and rhythm without murmur gallop or rub  Extremities without edema    Lab Results   Component Value Date    WBC 6.36 09/04/2018    HGB 13.9 09/04/2018    HCT 43.6 09/04/2018     09/04/2018    CHOL 175 09/04/2018    TRIG 211 (H) 09/04/2018    HDL 55 09/04/2018    ALT 16 09/04/2018    AST 13 09/04/2018     09/04/2018    K 4.7 09/04/2018     09/04/2018    CREATININE 1.0 09/04/2018    BUN 26 (H) 09/04/2018    CO2 27 09/04/2018    TSH 2.372 09/04/2018    HGBA1C 6.7 (H) 09/04/2018       Impression:  Diabetes, fairly well controlled with hemoglobin A1c of 6.7 despite her fasting sugars being 150-200.  Hypertension and lipids well controlled  Coronary artery disease, asymptomatic  Other medical problems below, stable  Patient Active Problem List   Diagnosis    Type II diabetes mellitus with complication    Hypertension associated with diabetes    Hyperlipidemia associated with type 2 diabetes mellitus    Parkinson disease    " CAD (coronary artery disease)    GERD (gastroesophageal reflux disease)    Chronic constipation    History of breast cancer    History of CVA (cerebrovascular accident)    Major depression, recurrent       Plan:  Orders Placed This Encounter    Hemoglobin A1c    Comprehensive metabolic panel    Lipid panel    TSH    CBC auto differential    glimepiride (AMARYL) 4 MG tablet     Her medications remain the same.  She will be seen again in 6 months with the above lab work.

## 2018-09-18 RX ORDER — LIRAGLUTIDE 6 MG/ML
INJECTION SUBCUTANEOUS
Qty: 6 SYRINGE | Refills: 3 | Status: SHIPPED | OUTPATIENT
Start: 2018-09-18 | End: 2019-09-25

## 2018-09-26 ENCOUNTER — TELEPHONE (OUTPATIENT)
Dept: INTERNAL MEDICINE | Facility: CLINIC | Age: 68
End: 2018-09-26

## 2018-09-26 NOTE — TELEPHONE ENCOUNTER
----- Message from Laurie Ramirez sent at 9/26/2018 12:47 PM CDT -----  Contact: [facility] [phone]  States that pt was discharge from agency today. States that she met all of his goals. Please call back at [phone]//thank you acc

## 2018-09-26 NOTE — TELEPHONE ENCOUNTER
[facility] called to inform her that she has been discharged because she met her goals .. He will fax information over.

## 2018-10-03 ENCOUNTER — OFFICE VISIT (OUTPATIENT)
Dept: INTERNAL MEDICINE | Facility: CLINIC | Age: 68
End: 2018-10-03
Payer: MEDICARE

## 2018-10-03 VITALS
RESPIRATION RATE: 16 BRPM | TEMPERATURE: 98 F | HEIGHT: 64 IN | DIASTOLIC BLOOD PRESSURE: 68 MMHG | WEIGHT: 158.5 LBS | BODY MASS INDEX: 27.06 KG/M2 | OXYGEN SATURATION: 98 % | HEART RATE: 96 BPM | SYSTOLIC BLOOD PRESSURE: 118 MMHG

## 2018-10-03 DIAGNOSIS — T14.8XXA ABRASION OF SKIN: Primary | ICD-10-CM

## 2018-10-03 PROCEDURE — 99999 PR PBB SHADOW E&M-EST. PATIENT-LVL IV: CPT | Mod: PBBFAC,,, | Performed by: NURSE PRACTITIONER

## 2018-10-03 PROCEDURE — 90471 IMMUNIZATION ADMIN: CPT | Mod: PBBFAC,PO

## 2018-10-03 PROCEDURE — 90662 IIV NO PRSV INCREASED AG IM: CPT | Mod: PBBFAC,PO

## 2018-10-03 PROCEDURE — 99213 OFFICE O/P EST LOW 20 MIN: CPT | Mod: 25,S$PBB,, | Performed by: NURSE PRACTITIONER

## 2018-10-03 PROCEDURE — 90472 IMMUNIZATION ADMIN EACH ADD: CPT | Mod: PBBFAC,PO

## 2018-10-03 PROCEDURE — 99214 OFFICE O/P EST MOD 30 MIN: CPT | Mod: PBBFAC,PO | Performed by: NURSE PRACTITIONER

## 2018-10-03 RX ORDER — MUPIROCIN 20 MG/G
OINTMENT TOPICAL DAILY
Qty: 22 G | Refills: 0 | Status: SHIPPED | OUTPATIENT
Start: 2018-10-03 | End: 2019-03-25

## 2018-10-03 RX ORDER — CEPHALEXIN 500 MG/1
500 CAPSULE ORAL EVERY 12 HOURS
COMMUNITY
End: 2019-03-25

## 2018-10-03 NOTE — PROGRESS NOTES
"Subjective:      Patient ID: Mari Mayorga is a 68 y.o. female.    Chief Complaint: arm injury Due to fall    HPI:  Patient is with her daughter today. She was seen at [facility] after hours a week ago after she fell on the road while putting something is the trash.  She scraped up her right elbow area.  Is is wrapped, has not looked at it since last week.  She is still taking her Keflex bid, tolerating it well.  Minimal pain, no fever    Past Medical History:   Diagnosis Date    Breast cancer     CAD (coronary artery disease)     s/p stent 2016    Chronic constipation     GERD (gastroesophageal reflux disease)     History of breast cancer     bilateral breast, XRT both times, no ctx    History of CVA (cerebrovascular accident)     Hyperlipidemia associated with type 2 diabetes mellitus     Hypertension associated with diabetes     Major depression, recurrent     Parkinson disease     Type II diabetes mellitus with complication        Past Surgical History:   Procedure Laterality Date    ADENOIDECTOMY      BACK SURGERY      BREAST LUMPECTOMY      CHOLECYSTECTOMY      HYSTERECTOMY      TONSILLECTOMY         Lab Results   Component Value Date    WBC 6.36 09/04/2018    HGB 13.9 09/04/2018    HCT 43.6 09/04/2018     09/04/2018    CHOL 175 09/04/2018    TRIG 211 (H) 09/04/2018    HDL 55 09/04/2018    ALT 16 09/04/2018    AST 13 09/04/2018     09/04/2018    K 4.7 09/04/2018     09/04/2018    CREATININE 1.0 09/04/2018    BUN 26 (H) 09/04/2018    CO2 27 09/04/2018    TSH 2.372 09/04/2018    HGBA1C 6.7 (H) 09/04/2018       /68   Pulse 96   Temp 98.4 °F (36.9 °C)   Resp 16   Ht 5' 4" (1.626 m)   Wt 71.9 kg (158 lb 8.2 oz)   SpO2 98%   BMI 27.21 kg/m²       Review of Systems   Constitutional: Negative for appetite change, fatigue and unexpected weight change.   HENT: Negative for congestion, ear pain, postnasal drip, rhinorrhea, sinus pressure, sneezing, sore throat, tinnitus, " trouble swallowing and voice change.    Eyes: Negative for pain, discharge, redness, itching and visual disturbance.   Respiratory: Negative for cough, chest tightness, shortness of breath and wheezing.    Cardiovascular: Negative for chest pain, palpitations and leg swelling.   Gastrointestinal: Negative for abdominal distention, abdominal pain, blood in stool, constipation, diarrhea, nausea and vomiting.        No reflux.   Genitourinary: Negative for difficulty urinating, dyspareunia, flank pain, menstrual problem and pelvic pain.   Musculoskeletal: Negative for arthralgias, back pain, myalgias and neck stiffness.   Skin: Positive for wound. Negative for color change and rash.   Neurological: Negative for dizziness and headaches.   Psychiatric/Behavioral: Negative for confusion and sleep disturbance. The patient is not nervous/anxious.       Objective:     Physical Exam   Constitutional: She is oriented to person, place, and time. She appears well-developed and well-nourished. No distress.   Musculoskeletal:   Normal gait   Neurological: She is alert and oriented to person, place, and time.   Skin: Skin is warm and dry.   Right proximal posterior forearm near elbow, site has mild redness surrounding it, no signs of infection, no bleeding or swelling. Good granulation tissue.  Cleaned with sterile saline, applied tripple antibiotic ointment, gauze.   Psychiatric: She has a normal mood and affect. Her behavior is normal.     Assessment:      1. Abrasion of skin      Plan:   Abrasion of skin    Other orders  -     mupirocin (BACTROBAN) 2 % ointment; Apply topically once daily.  Dispense: 22 g; Refill: 0  -     (In Office Administered) Tdap Vaccine  -     Influenza - High Dose (65+) (PF) (IM)    had high dose flu shot and Tdap today.  Will clean daily and apply ointment, finish antibiotics      Current Outpatient Medications:     ALPRAZolam (XANAX) 0.25 MG tablet, TAKE 1 TABLET BY MOUTH 3  TIMES DAILY AS NEEDED,  Disp: 90 tablet, Rfl: 5    AMITIZA 24 mcg Cap, TAKE 1 CAPSULE BY MOUTH DAILY WITH MEALS, Disp: 90 capsule, Rfl: 3    amLODIPine (NORVASC) 2.5 MG tablet, Take 2.5 mg by mouth., Disp: , Rfl:     aspirin (ECOTRIN) 81 MG EC tablet, Take 81 mg by mouth., Disp: , Rfl:     blood sugar diagnostic Strp, Check blood sugar (4) times daily, Disp: , Rfl:     blood-glucose meter (CLEVER CHOICE BLOOD GLUC SYS) [facility], , Disp: , Rfl:     buPROPion (WELLBUTRIN XL) 300 MG 24 hr tablet, Take 1 tablet (300 mg total) by mouth once daily., Disp: 90 tablet, Rfl: 3    carbidopa-levodopa  mg (SINEMET)  mg per tablet, Take 2 tablets by mouth 4 (four) times daily. Takes 2 1/2 pills by mouth four times daily, Disp: , Rfl:     cephALEXin (KEFLEX) 500 MG capsule, Take 500 mg by mouth every 12 (twelve) hours., Disp: , Rfl:     glimepiride (AMARYL) 4 MG tablet, Take 1 tablet (4 mg total) by mouth daily with breakfast., Disp: 90 tablet, Rfl: 3    insulin syringes, disposable, 1 mL Syrg, Inject into the skin., Disp: , Rfl:     lancets 28 gauge Misc, Inject 1 applicator into the skin., Disp: , Rfl:     lancing device (LANCING DEVICE WITH LANCETS) Misc, , Disp: , Rfl:     lisinopril (PRINIVIL,ZESTRIL) 40 MG tablet, Take 1 tablet by mouth once daily., Disp: , Rfl:     metFORMIN (GLUCOPHAGE) 500 MG tablet, TAKE 1 TABLET (500 MG TOTAL) BY MOUTH 2 (TWO) TIMES DAILY WITH MEALS., Disp: 180 tablet, Rfl: 3    pantoprazole (PROTONIX) 40 MG tablet, Take 1 tablet (40 mg total) by mouth once daily., Disp: 90 tablet, Rfl: 3    VICTOZA 2-MADDY 0.6 mg/0.1 mL (18 mg/3 mL) PnIj, INJECT 1.2 MG INTO THE SKIN ONCE DAILY, Disp: 6 Syringe, Rfl: 3    mupirocin (BACTROBAN) 2 % ointment, Apply topically once daily., Disp: 22 g, Rfl: 0

## 2018-10-05 ENCOUNTER — TELEPHONE (OUTPATIENT)
Dept: ADMINISTRATIVE | Facility: CLINIC | Age: 68
End: 2018-10-05

## 2018-10-05 NOTE — TELEPHONE ENCOUNTER
Home Health SOC 07/31/2018 - 09/28/2018 with [facility] - Dr. Yesica Holguin. PT services.

## 2018-10-16 NOTE — TELEPHONE ENCOUNTER
----- Message from Marisabel Rodriguez sent at 7/19/2017 11:39 AM CDT -----  Contact: pt  She's calling to get a refill...    1. What is the name of the medication you are requesting? Alprazolam  2. What is the dose? 0.25 mg  3. How do you take the medication? Orally, topically, etc? orally  4. How often do you take this medication? Once at night  5. Do you need a 30 day or 90 day supply? 90-day  6. How many refills are you requesting? 1  7. What is your preferred pharmacy and location of the pharmacy? [facility]  8. Who can we contact with further questions? [phone] (pyaa)       EM PGY1 Becky Banks MD: No dissection on CTA. Troponin trending down from 17 to 11. HEART score 4. Will have pt stay in CDU for stress testing.

## 2018-11-25 RX ORDER — LUBIPROSTONE 24 UG/1
CAPSULE, GELATIN COATED ORAL
Qty: 90 CAPSULE | Refills: 3 | Status: SHIPPED | OUTPATIENT
Start: 2018-11-25 | End: 2019-11-22 | Stop reason: SDUPTHER

## 2018-12-06 ENCOUNTER — TELEPHONE (OUTPATIENT)
Dept: INTERNAL MEDICINE | Facility: CLINIC | Age: 68
End: 2018-12-06

## 2018-12-06 NOTE — TELEPHONE ENCOUNTER
----- Message from Ade Francisite sent at 12/6/2018  4:41 PM CST -----  Contact: Jayy with [employer] called and stated he was calling to confirm if a fax was received. He can be reached at [phone].    Thanks,  Tf

## 2018-12-06 NOTE — TELEPHONE ENCOUNTER
Called pt left message to call office about order for back brace and knee brace.  Prescription request from [facility].

## 2018-12-18 ENCOUNTER — TELEPHONE (OUTPATIENT)
Dept: INTERNAL MEDICINE | Facility: CLINIC | Age: 68
End: 2018-12-18

## 2018-12-18 RX ORDER — ALPRAZOLAM 0.25 MG/1
TABLET ORAL
Qty: 90 TABLET | Refills: 5 | Status: SHIPPED | OUTPATIENT
Start: 2018-12-18 | End: 2018-12-19

## 2018-12-18 NOTE — TELEPHONE ENCOUNTER
----- Message from Chikis Rascon sent at 12/18/2018  3:15 PM CST -----  Contact: pt   Pt is requesting a call back from the nurse in regards to the pt getting a refill  [phone] (home)       1. What is the name of the medication you are requesting? ALPRAZolam   2. What is the dose? 0.25 mg   3. How do you take the medication? Orally, topically, etc?   4. How often do you take this medication? 1 at night  5. Do you need a 30 day or 90 day supply? 90 day   6. How many refills are you requesting? Per   7. What is your preferred pharmacy and location of the pharmacy?    [facility]/pharmacy #[id number] - [place], [place] - [address] [address]  [address] [address]  Phone: [phone] Fax: [phone]    8. Who can we contact with further questions? Pt

## 2018-12-18 NOTE — TELEPHONE ENCOUNTER
----- Message from Chikis Rascon sent at 12/18/2018  3:15 PM CST -----  Contact: pt   Pt is requesting a call back from the nurse in regards to the pt getting a refill  [phone] (home)       1. What is the name of the medication you are requesting? ALPRAZolam   2. What is the dose? 0.25 mg   3. How do you take the medication? Orally, topically, etc?   4. How often do you take this medication? 1 at night  5. Do you need a 30 day or 90 day supply? 90 day   6. How many refills are you requesting? Per   7. What is your preferred pharmacy and location of the pharmacy?    [facility]/pharmacy #[id number] - [place], [place] - [address] [address]  [address] [address] [address]  Phone: [phone] Fax: [phone]    8. Who can we contact with further questions? Pt

## 2018-12-19 RX ORDER — ALPRAZOLAM 0.25 MG/1
0.25 TABLET ORAL 3 TIMES DAILY PRN
Qty: 90 TABLET | Refills: 5 | OUTPATIENT
Start: 2018-12-19 | End: 2019-01-18

## 2018-12-19 RX ORDER — ALPRAZOLAM 0.25 MG/1
0.25 TABLET ORAL 3 TIMES DAILY PRN
Qty: 90 TABLET | Refills: 5 | Status: SHIPPED | OUTPATIENT
Start: 2018-12-19 | End: 2019-03-25

## 2018-12-19 NOTE — TELEPHONE ENCOUNTER
----- Message from Jud Contreras sent at 12/19/2018  8:12 AM CST -----  Contact: self/[phone]  Returning call, please call back at [phone]. Thanks/ar

## 2019-01-23 ENCOUNTER — PATIENT OUTREACH (OUTPATIENT)
Dept: ADMINISTRATIVE | Facility: HOSPITAL | Age: 69
End: 2019-01-23

## 2019-02-11 RX ORDER — BUPROPION HYDROCHLORIDE 300 MG/1
TABLET ORAL
Qty: 90 TABLET | Refills: 3 | Status: SHIPPED | OUTPATIENT
Start: 2019-02-11 | End: 2020-01-30

## 2019-02-12 RX ORDER — METFORMIN HYDROCHLORIDE 500 MG/1
500 TABLET ORAL 2 TIMES DAILY WITH MEALS
Qty: 180 TABLET | Refills: 3 | Status: SHIPPED | OUTPATIENT
Start: 2019-02-12 | End: 2020-02-17 | Stop reason: SDUPTHER

## 2019-03-07 ENCOUNTER — PES CALL (OUTPATIENT)
Dept: ADMINISTRATIVE | Facility: CLINIC | Age: 69
End: 2019-03-07

## 2019-03-10 RX ORDER — PANTOPRAZOLE SODIUM 40 MG/1
TABLET, DELAYED RELEASE ORAL
Qty: 90 TABLET | Refills: 3 | Status: SHIPPED | OUTPATIENT
Start: 2019-03-10 | End: 2020-03-04

## 2019-03-20 ENCOUNTER — LAB VISIT (OUTPATIENT)
Dept: LAB | Facility: HOSPITAL | Age: 69
End: 2019-03-20
Attending: INTERNAL MEDICINE
Payer: MEDICARE

## 2019-03-20 DIAGNOSIS — E11.8 TYPE 2 DIABETES MELLITUS WITH COMPLICATION, WITH LONG-TERM CURRENT USE OF INSULIN: ICD-10-CM

## 2019-03-20 DIAGNOSIS — Z79.4 TYPE 2 DIABETES MELLITUS WITH COMPLICATION, WITH LONG-TERM CURRENT USE OF INSULIN: ICD-10-CM

## 2019-03-20 LAB
ALBUMIN SERPL BCP-MCNC: 3.6 G/DL
ALP SERPL-CCNC: 69 U/L
ALT SERPL W/O P-5'-P-CCNC: 20 U/L
ANION GAP SERPL CALC-SCNC: 9 MMOL/L
AST SERPL-CCNC: 13 U/L
BASOPHILS # BLD AUTO: 0.04 K/UL
BASOPHILS NFR BLD: 0.7 %
BILIRUB SERPL-MCNC: 0.5 MG/DL
BUN SERPL-MCNC: 24 MG/DL
CALCIUM SERPL-MCNC: 10 MG/DL
CHLORIDE SERPL-SCNC: 101 MMOL/L
CHOLEST SERPL-MCNC: 205 MG/DL
CHOLEST/HDLC SERPL: 3.7 {RATIO}
CO2 SERPL-SCNC: 29 MMOL/L
CREAT SERPL-MCNC: 1.1 MG/DL
DIFFERENTIAL METHOD: ABNORMAL
EOSINOPHIL # BLD AUTO: 0.1 K/UL
EOSINOPHIL NFR BLD: 2.5 %
ERYTHROCYTE [DISTWIDTH] IN BLOOD BY AUTOMATED COUNT: 12.5 %
EST. GFR  (AFRICAN AMERICAN): 58.4 ML/MIN/1.73 M^2
EST. GFR  (NON AFRICAN AMERICAN): 50.6 ML/MIN/1.73 M^2
ESTIMATED AVG GLUCOSE: 157 MG/DL
GLUCOSE SERPL-MCNC: 446 MG/DL
HBA1C MFR BLD HPLC: 7.1 %
HCT VFR BLD AUTO: 43.1 %
HDLC SERPL-MCNC: 55 MG/DL
HDLC SERPL: 26.8 %
HGB BLD-MCNC: 14.1 G/DL
IMM GRANULOCYTES # BLD AUTO: 0.01 K/UL
IMM GRANULOCYTES NFR BLD AUTO: 0.2 %
LDLC SERPL CALC-MCNC: 94.4 MG/DL
LYMPHOCYTES # BLD AUTO: 1 K/UL
LYMPHOCYTES NFR BLD: 16.7 %
MCH RBC QN AUTO: 31.1 PG
MCHC RBC AUTO-ENTMCNC: 32.7 G/DL
MCV RBC AUTO: 95 FL
MONOCYTES # BLD AUTO: 0.4 K/UL
MONOCYTES NFR BLD: 6.5 %
NEUTROPHILS # BLD AUTO: 4.2 K/UL
NEUTROPHILS NFR BLD: 73.4 %
NONHDLC SERPL-MCNC: 150 MG/DL
NRBC BLD-RTO: 0 /100 WBC
PLATELET # BLD AUTO: 278 K/UL
PMV BLD AUTO: 11.1 FL
POTASSIUM SERPL-SCNC: 4.8 MMOL/L
PROT SERPL-MCNC: 6.8 G/DL
RBC # BLD AUTO: 4.54 M/UL
SODIUM SERPL-SCNC: 139 MMOL/L
TRIGL SERPL-MCNC: 278 MG/DL
TSH SERPL DL<=0.005 MIU/L-ACNC: 2.48 UIU/ML
WBC # BLD AUTO: 5.69 K/UL

## 2019-03-20 PROCEDURE — 36415 COLL VENOUS BLD VENIPUNCTURE: CPT | Mod: PO

## 2019-03-20 PROCEDURE — 80061 LIPID PANEL: CPT

## 2019-03-20 PROCEDURE — 80053 COMPREHEN METABOLIC PANEL: CPT

## 2019-03-20 PROCEDURE — 84443 ASSAY THYROID STIM HORMONE: CPT

## 2019-03-20 PROCEDURE — 83036 HEMOGLOBIN GLYCOSYLATED A1C: CPT

## 2019-03-20 PROCEDURE — 85025 COMPLETE CBC W/AUTO DIFF WBC: CPT

## 2019-03-25 ENCOUNTER — OFFICE VISIT (OUTPATIENT)
Dept: INTERNAL MEDICINE | Facility: CLINIC | Age: 69
End: 2019-03-25
Payer: MEDICARE

## 2019-03-25 VITALS
TEMPERATURE: 98 F | OXYGEN SATURATION: 96 % | SYSTOLIC BLOOD PRESSURE: 116 MMHG | HEIGHT: 64 IN | BODY MASS INDEX: 26.34 KG/M2 | DIASTOLIC BLOOD PRESSURE: 64 MMHG | WEIGHT: 154.31 LBS | HEART RATE: 93 BPM | RESPIRATION RATE: 16 BRPM

## 2019-03-25 DIAGNOSIS — I15.2 HYPERTENSION ASSOCIATED WITH DIABETES: ICD-10-CM

## 2019-03-25 DIAGNOSIS — E11.69 HYPERLIPIDEMIA ASSOCIATED WITH TYPE 2 DIABETES MELLITUS: ICD-10-CM

## 2019-03-25 DIAGNOSIS — E11.8 TYPE 2 DIABETES MELLITUS WITH COMPLICATION, WITH LONG-TERM CURRENT USE OF INSULIN: Primary | ICD-10-CM

## 2019-03-25 DIAGNOSIS — Z12.39 SCREENING BREAST EXAMINATION: ICD-10-CM

## 2019-03-25 DIAGNOSIS — E78.5 HYPERLIPIDEMIA ASSOCIATED WITH TYPE 2 DIABETES MELLITUS: ICD-10-CM

## 2019-03-25 DIAGNOSIS — Z86.73 HISTORY OF CVA (CEREBROVASCULAR ACCIDENT): ICD-10-CM

## 2019-03-25 DIAGNOSIS — Z85.3 HISTORY OF BREAST CANCER: ICD-10-CM

## 2019-03-25 DIAGNOSIS — G20.A1 PARKINSON DISEASE: ICD-10-CM

## 2019-03-25 DIAGNOSIS — E11.59 HYPERTENSION ASSOCIATED WITH DIABETES: ICD-10-CM

## 2019-03-25 DIAGNOSIS — I25.10 CORONARY ARTERY DISEASE, ANGINA PRESENCE UNSPECIFIED, UNSPECIFIED VESSEL OR LESION TYPE, UNSPECIFIED WHETHER NATIVE OR TRANSPLANTED HEART: ICD-10-CM

## 2019-03-25 DIAGNOSIS — Z79.4 TYPE 2 DIABETES MELLITUS WITH COMPLICATION, WITH LONG-TERM CURRENT USE OF INSULIN: Primary | ICD-10-CM

## 2019-03-25 DIAGNOSIS — F33.1 MODERATE EPISODE OF RECURRENT MAJOR DEPRESSIVE DISORDER: ICD-10-CM

## 2019-03-25 DIAGNOSIS — K21.9 GASTROESOPHAGEAL REFLUX DISEASE, ESOPHAGITIS PRESENCE NOT SPECIFIED: ICD-10-CM

## 2019-03-25 PROCEDURE — 99214 OFFICE O/P EST MOD 30 MIN: CPT | Mod: S$PBB,,, | Performed by: INTERNAL MEDICINE

## 2019-03-25 PROCEDURE — 99213 OFFICE O/P EST LOW 20 MIN: CPT | Mod: PBBFAC,PO | Performed by: INTERNAL MEDICINE

## 2019-03-25 PROCEDURE — 99999 PR PBB SHADOW E&M-EST. PATIENT-LVL III: ICD-10-PCS | Mod: PBBFAC,,, | Performed by: INTERNAL MEDICINE

## 2019-03-25 PROCEDURE — 99999 PR PBB SHADOW E&M-EST. PATIENT-LVL III: CPT | Mod: PBBFAC,,, | Performed by: INTERNAL MEDICINE

## 2019-03-25 PROCEDURE — 99214 PR OFFICE/OUTPT VISIT, EST, LEVL IV, 30-39 MIN: ICD-10-PCS | Mod: S$PBB,,, | Performed by: INTERNAL MEDICINE

## 2019-03-25 RX ORDER — MEMANTINE HYDROCHLORIDE 10 MG/1
10 TABLET ORAL
COMMUNITY
Start: 2019-01-08 | End: 2019-03-25 | Stop reason: SDUPTHER

## 2019-03-25 RX ORDER — AMLODIPINE BESYLATE 2.5 MG/1
2.5 TABLET ORAL DAILY
Qty: 90 TABLET | Refills: 3
Start: 2019-03-25 | End: 2020-07-28 | Stop reason: SDUPTHER

## 2019-03-25 NOTE — PROGRESS NOTES
"HPI:  Patient is a 69-year-old female who comes today for follow-up of her diabetes, hypertension, lipids, and for her annual physical.  She has been doing fairly well.  This been no major problems. Her blood pressure tends to run slightly low at home.  She has had no chest pain.  She has had no hypoglycemic events.      Current MEDS: medcard review, verified and update  Allergies: Per the electronic medical record    Past Medical History:   Diagnosis Date    Breast cancer     CAD (coronary artery disease)     s/p stent 2016    Chronic constipation     GERD (gastroesophageal reflux disease)     History of breast cancer     bilateral breast, XRT both times, no ctx    History of CVA (cerebrovascular accident)     Hyperlipidemia associated with type 2 diabetes mellitus     Hypertension associated with diabetes     Major depression, recurrent     Parkinson disease     Type II diabetes mellitus with complication        Past Surgical History:   Procedure Laterality Date    ADENOIDECTOMY      BACK SURGERY      BREAST LUMPECTOMY      CHOLECYSTECTOMY      HYSTERECTOMY      TONSILLECTOMY         SHx: per the electronic medical record    FHx: recorded in the electronic medical record    ROS:    denies any chest pains or shortness of breath. Denies any nausea, vomiting or diarrhea. Denies any fever, chills or sweats. Denies any change in weight, voice, stool, skin or hair. Denies any dysuria, dyspepsia or dysphagia. Denies any change in vision, hearing or headaches. Denies any swollen lymph nodes or loss of memory.    PE:  /64 (BP Location: Right arm, Patient Position: Sitting, BP Method: Small (Manual))   Pulse 93   Temp 98.1 °F (36.7 °C) (Tympanic)   Resp 16   Ht 5' 4" (1.626 m)   Wt 70 kg (154 lb 5.2 oz)   SpO2 96%   BMI 26.49 kg/m²   Gen: Well-developed, well-nourished, female, in no acute distress, oriented x3  HEENT: neck is supple, no adenopathy, carotids 2+ equal without bruits, thyroid " exam normal size without nodules.  CHEST: clear to auscultation and percussion  CVS: regular rate and rhythm without significant murmur, gallop, or rubs  ABD: soft, benign, no rebound no guarding, no distention. Bowel sounds are normal.     Nontender,  no palpable masses, no organomegaly and no audible bruits.  BREAST:  Deferred  EXT: no clubbing, cyanosis, or edema  LYMPH: no cervical, inguinal, or axillary adenopathy  FEET: no loss of sensation.  No ulcers or pressure sores.  Monofilament testing normal  NEURO: gait normal.  Cranial nerves II- XII intact. No nystagmus.  Speech normal.   Gross motor and sensory unremarkable.  PELVIC: deferred    Lab Results   Component Value Date    WBC 5.69 03/20/2019    HGB 14.1 03/20/2019    HCT 43.1 03/20/2019     03/20/2019    CHOL 205 (H) 03/20/2019    TRIG 278 (H) 03/20/2019    HDL 55 03/20/2019    ALT 20 03/20/2019    AST 13 03/20/2019     03/20/2019    K 4.8 03/20/2019     03/20/2019    CREATININE 1.1 03/20/2019    BUN 24 (H) 03/20/2019    CO2 29 03/20/2019    TSH 2.475 03/20/2019    HGBA1C 7.1 (H) 03/20/2019       Impression:  Multiple medical problems below, stable  Patient Active Problem List   Diagnosis    Type II diabetes mellitus with complication    Hypertension associated with diabetes    Hyperlipidemia associated with type 2 diabetes mellitus    Parkinson disease    CAD (coronary artery disease)    GERD (gastroesophageal reflux disease)    Chronic constipation    History of breast cancer    History of CVA (cerebrovascular accident)    Major depression, recurrent       Plan:   Orders Placed This Encounter    Comprehensive metabolic panel    Lipid panel    Hemoglobin A1c    Protein / creatinine ratio, urine    TSH    CBC auto differential    amLODIPine (NORVASC) 2.5 MG tablet     She will decrease the Norvasc to take it just 1 today.  She will be seen again in 6 months with above lab work.  Her other medications remain the  same    This note is generated with speech recognition software and is subject to transcription error and sound alike phrases that may be missed by proofreading.

## 2019-06-21 RX ORDER — GLIMEPIRIDE 4 MG/1
TABLET ORAL
Qty: 90 TABLET | Refills: 3 | Status: SHIPPED | OUTPATIENT
Start: 2019-06-21 | End: 2020-03-09

## 2019-06-21 RX ORDER — LIRAGLUTIDE 6 MG/ML
INJECTION SUBCUTANEOUS
Qty: 6 ML | Refills: 11 | Status: SHIPPED | OUTPATIENT
Start: 2019-06-21 | End: 2019-09-25

## 2019-09-19 ENCOUNTER — LAB VISIT (OUTPATIENT)
Dept: LAB | Facility: HOSPITAL | Age: 69
End: 2019-09-19
Attending: INTERNAL MEDICINE
Payer: MEDICARE

## 2019-09-19 DIAGNOSIS — Z79.4 TYPE 2 DIABETES MELLITUS WITH COMPLICATION, WITH LONG-TERM CURRENT USE OF INSULIN: ICD-10-CM

## 2019-09-19 DIAGNOSIS — E11.8 TYPE 2 DIABETES MELLITUS WITH COMPLICATION, WITH LONG-TERM CURRENT USE OF INSULIN: ICD-10-CM

## 2019-09-19 LAB
BASOPHILS # BLD AUTO: 0.05 K/UL (ref 0–0.2)
BASOPHILS NFR BLD: 0.9 % (ref 0–1.9)
DIFFERENTIAL METHOD: ABNORMAL
EOSINOPHIL # BLD AUTO: 0.2 K/UL (ref 0–0.5)
EOSINOPHIL NFR BLD: 3.1 % (ref 0–8)
ERYTHROCYTE [DISTWIDTH] IN BLOOD BY AUTOMATED COUNT: 12.6 % (ref 11.5–14.5)
ESTIMATED AVG GLUCOSE: 169 MG/DL (ref 68–131)
HBA1C MFR BLD HPLC: 7.5 % (ref 4–5.6)
HCT VFR BLD AUTO: 44.7 % (ref 37–48.5)
HGB BLD-MCNC: 14 G/DL (ref 12–16)
IMM GRANULOCYTES # BLD AUTO: 0.01 K/UL (ref 0–0.04)
IMM GRANULOCYTES NFR BLD AUTO: 0.2 % (ref 0–0.5)
LYMPHOCYTES # BLD AUTO: 1.2 K/UL (ref 1–4.8)
LYMPHOCYTES NFR BLD: 20.8 % (ref 18–48)
MCH RBC QN AUTO: 30.6 PG (ref 27–31)
MCHC RBC AUTO-ENTMCNC: 31.3 G/DL (ref 32–36)
MCV RBC AUTO: 98 FL (ref 82–98)
MONOCYTES # BLD AUTO: 0.4 K/UL (ref 0.3–1)
MONOCYTES NFR BLD: 7.2 % (ref 4–15)
NEUTROPHILS # BLD AUTO: 3.9 K/UL (ref 1.8–7.7)
NEUTROPHILS NFR BLD: 67.8 % (ref 38–73)
NRBC BLD-RTO: 0 /100 WBC
PLATELET # BLD AUTO: 288 K/UL (ref 150–350)
PMV BLD AUTO: 10.1 FL (ref 9.2–12.9)
RBC # BLD AUTO: 4.57 M/UL (ref 4–5.4)
WBC # BLD AUTO: 5.73 K/UL (ref 3.9–12.7)

## 2019-09-19 PROCEDURE — 84443 ASSAY THYROID STIM HORMONE: CPT

## 2019-09-19 PROCEDURE — 80061 LIPID PANEL: CPT

## 2019-09-19 PROCEDURE — 83036 HEMOGLOBIN GLYCOSYLATED A1C: CPT

## 2019-09-19 PROCEDURE — 36415 COLL VENOUS BLD VENIPUNCTURE: CPT | Mod: PO

## 2019-09-19 PROCEDURE — 80053 COMPREHEN METABOLIC PANEL: CPT

## 2019-09-19 PROCEDURE — 85025 COMPLETE CBC W/AUTO DIFF WBC: CPT

## 2019-09-20 LAB
ALBUMIN SERPL BCP-MCNC: 3.7 G/DL (ref 3.5–5.2)
ALP SERPL-CCNC: 67 U/L (ref 55–135)
ALT SERPL W/O P-5'-P-CCNC: 7 U/L (ref 10–44)
ANION GAP SERPL CALC-SCNC: 14 MMOL/L (ref 8–16)
AST SERPL-CCNC: 13 U/L (ref 10–40)
BILIRUB SERPL-MCNC: 0.5 MG/DL (ref 0.1–1)
BUN SERPL-MCNC: 15 MG/DL (ref 8–23)
CALCIUM SERPL-MCNC: 9.6 MG/DL (ref 8.7–10.5)
CHLORIDE SERPL-SCNC: 103 MMOL/L (ref 95–110)
CHOLEST SERPL-MCNC: 205 MG/DL (ref 120–199)
CHOLEST/HDLC SERPL: 3.7 {RATIO} (ref 2–5)
CO2 SERPL-SCNC: 23 MMOL/L (ref 23–29)
CREAT SERPL-MCNC: 0.9 MG/DL (ref 0.5–1.4)
EST. GFR  (AFRICAN AMERICAN): >60 ML/MIN/1.73 M^2
EST. GFR  (NON AFRICAN AMERICAN): >60 ML/MIN/1.73 M^2
GLUCOSE SERPL-MCNC: 172 MG/DL (ref 70–110)
HDLC SERPL-MCNC: 55 MG/DL (ref 40–75)
HDLC SERPL: 26.8 % (ref 20–50)
LDLC SERPL CALC-MCNC: 89 MG/DL (ref 63–159)
NONHDLC SERPL-MCNC: 150 MG/DL
POTASSIUM SERPL-SCNC: 4.4 MMOL/L (ref 3.5–5.1)
PROT SERPL-MCNC: 6.6 G/DL (ref 6–8.4)
SODIUM SERPL-SCNC: 140 MMOL/L (ref 136–145)
TRIGL SERPL-MCNC: 305 MG/DL (ref 30–150)
TSH SERPL DL<=0.005 MIU/L-ACNC: 1.97 UIU/ML (ref 0.4–4)

## 2019-09-25 ENCOUNTER — OFFICE VISIT (OUTPATIENT)
Dept: INTERNAL MEDICINE | Facility: CLINIC | Age: 69
End: 2019-09-25
Payer: MEDICARE

## 2019-09-25 VITALS
TEMPERATURE: 98 F | HEART RATE: 89 BPM | SYSTOLIC BLOOD PRESSURE: 110 MMHG | DIASTOLIC BLOOD PRESSURE: 70 MMHG | BODY MASS INDEX: 29.66 KG/M2 | HEIGHT: 64 IN | WEIGHT: 173.75 LBS | OXYGEN SATURATION: 97 %

## 2019-09-25 DIAGNOSIS — E11.69 HYPERLIPIDEMIA ASSOCIATED WITH TYPE 2 DIABETES MELLITUS: Primary | ICD-10-CM

## 2019-09-25 DIAGNOSIS — G30.1 LATE ONSET ALZHEIMER'S DISEASE WITHOUT BEHAVIORAL DISTURBANCE: ICD-10-CM

## 2019-09-25 DIAGNOSIS — F02.80 LATE ONSET ALZHEIMER'S DISEASE WITHOUT BEHAVIORAL DISTURBANCE: ICD-10-CM

## 2019-09-25 DIAGNOSIS — I15.2 HYPERTENSION ASSOCIATED WITH DIABETES: ICD-10-CM

## 2019-09-25 DIAGNOSIS — Z79.4 TYPE 2 DIABETES MELLITUS WITH COMPLICATION, WITH LONG-TERM CURRENT USE OF INSULIN: ICD-10-CM

## 2019-09-25 DIAGNOSIS — I25.10 CORONARY ARTERY DISEASE, ANGINA PRESENCE UNSPECIFIED, UNSPECIFIED VESSEL OR LESION TYPE, UNSPECIFIED WHETHER NATIVE OR TRANSPLANTED HEART: ICD-10-CM

## 2019-09-25 DIAGNOSIS — E78.5 HYPERLIPIDEMIA ASSOCIATED WITH TYPE 2 DIABETES MELLITUS: Primary | ICD-10-CM

## 2019-09-25 DIAGNOSIS — E11.59 HYPERTENSION ASSOCIATED WITH DIABETES: ICD-10-CM

## 2019-09-25 DIAGNOSIS — E11.8 TYPE 2 DIABETES MELLITUS WITH COMPLICATION, WITH LONG-TERM CURRENT USE OF INSULIN: ICD-10-CM

## 2019-09-25 PROCEDURE — 99214 OFFICE O/P EST MOD 30 MIN: CPT | Mod: 25,S$PBB,, | Performed by: INTERNAL MEDICINE

## 2019-09-25 PROCEDURE — 99999 PR PBB SHADOW E&M-EST. PATIENT-LVL IV: CPT | Mod: PBBFAC,,, | Performed by: INTERNAL MEDICINE

## 2019-09-25 PROCEDURE — 99214 PR OFFICE/OUTPT VISIT, EST, LEVL IV, 30-39 MIN: ICD-10-PCS | Mod: 25,S$PBB,, | Performed by: INTERNAL MEDICINE

## 2019-09-25 PROCEDURE — 99999 PR PBB SHADOW E&M-EST. PATIENT-LVL IV: ICD-10-PCS | Mod: PBBFAC,,, | Performed by: INTERNAL MEDICINE

## 2019-09-25 PROCEDURE — 99214 OFFICE O/P EST MOD 30 MIN: CPT | Mod: PBBFAC,PO,25 | Performed by: INTERNAL MEDICINE

## 2019-09-25 PROCEDURE — 90662 IIV NO PRSV INCREASED AG IM: CPT | Mod: PBBFAC,PO

## 2019-09-25 PROCEDURE — G0009 ADMIN PNEUMOCOCCAL VACCINE: HCPCS | Mod: PBBFAC,PO

## 2019-09-25 RX ORDER — DONEPEZIL HYDROCHLORIDE 10 MG/1
10 TABLET, FILM COATED ORAL NIGHTLY
Qty: 90 TABLET | Refills: 3 | Status: SHIPPED | OUTPATIENT
Start: 2019-09-25 | End: 2019-10-10

## 2019-09-25 RX ORDER — MEMANTINE HYDROCHLORIDE 10 MG/1
10 TABLET ORAL 2 TIMES DAILY
Qty: 60 TABLET | Refills: 11
Start: 2019-09-25 | End: 2022-05-11

## 2019-09-25 NOTE — PROGRESS NOTES
Pneumococcal 23 and fluzone administered. Pt tolerated injection well. Advised to wait in clinic for 15 minutes for observation. Pt verbalized understanding.

## 2019-09-25 NOTE — PROGRESS NOTES
"HPI:  Patient is a 69-year-old female who comes today for follow-up of diabetes, hypertension, lipids, and Parkinson's disease. The daughter is here with her.  She privately discussed with me concerns regarding her level of function.  She is very withdrawn.  Her neurologist who treats her Parkinson's disease and started her on Namenda.  He thinks he has some underlying dementia.  He could possibly related to the Parkinson's versus just Alzheimer's dementia itself.  Her blood sugars have generally been less than 140 in the morning.  There has been no major problems with hypoglycemia.    Current meds have been verified and updated per the EMR  Exam:/70 (BP Location: Right arm)   Pulse 89   Temp 97.5 °F (36.4 °C) (Tympanic)   Ht 5' 4" (1.626 m)   Wt 78.8 kg (173 lb 11.6 oz)   SpO2 97%   BMI 29.82 kg/m²   Carotids 2+ equal without bruits  Chest clear  Cardiovascular regular rate and rhythm without murmur gallop or rub  Right knee has significant changes of osteoarthritis  Mental status exam she is only were able to recall 1 word out of 5 words at 5 min.  She is able to write her name.  She heavily distorts the image of o'clock.    Lab Results   Component Value Date    WBC 5.73 09/19/2019    HGB 14.0 09/19/2019    HCT 44.7 09/19/2019     09/19/2019    CHOL 205 (H) 09/19/2019    TRIG 305 (H) 09/19/2019    HDL 55 09/19/2019    ALT 7 (L) 09/19/2019    AST 13 09/19/2019     09/19/2019    K 4.4 09/19/2019     09/19/2019    CREATININE 0.9 09/19/2019    BUN 15 09/19/2019    CO2 23 09/19/2019    TSH 1.972 09/19/2019    HGBA1C 7.5 (H) 09/19/2019       Impression:  Dementia, suspect Alzheimer's, early stages  Multiple other medical problems below, stable  Patient Active Problem List   Diagnosis    Type II diabetes mellitus with complication    Hypertension associated with diabetes    Hyperlipidemia associated with type 2 diabetes mellitus    Parkinson disease    CAD (coronary artery disease)    " GERD (gastroesophageal reflux disease)    Chronic constipation    History of breast cancer    History of CVA (cerebrovascular accident)    Major depression, recurrent    Dementia       Plan:  Orders Placed This Encounter    (In Office Administered) Pneumococcal Polysaccharide Vaccine (23 Valent) (SQ/IM)    Hemoglobin A1c    Lipid panel    Basic metabolic panel    liraglutide 0.6 mg/0.1 mL, 18 mg/3 mL, subq PNIJ (VICTOZA 3-MADDY) 0.6 mg/0.1 mL (18 mg/3 mL) PnIj    memantine (NAMENDA) 10 MG Tab    donepezil (ARICEPT) 10 MG tablet     Encouraged her to consider also adding Aricept to her regimen.  Patient will also try to be much more consistent with Victoza.  She will see me again in 6 months.  She will be given both high-dose influenza and pneumococcal 23 vaccine    This note is generated with speech recognition software and is subject to transcription error and sound alike phrases that may be missed by proofreading.

## 2019-10-07 ENCOUNTER — TELEPHONE (OUTPATIENT)
Dept: INTERNAL MEDICINE | Facility: CLINIC | Age: 69
End: 2019-10-07

## 2019-10-07 NOTE — TELEPHONE ENCOUNTER
Pt daughter is calling in regards to side effects from medication (Aricept) pt is having N/V, possibly dehydrated. Wondering is she should withdraw medication or take something to treat symptoms. I informed her that I will ask a provider on call and get back to her. Pt daughter verbalized understanding.   Call back #[phone].

## 2019-10-07 NOTE — TELEPHONE ENCOUNTER
----- Message from Zak Trammell sent at 10/7/2019  1:23 PM CDT -----  Contact: Evaristo   Would like cb in reference to issues that pt is having with medication change, causing stomach issues. pls return call.             [phone]

## 2019-10-07 NOTE — TELEPHONE ENCOUNTER
I spoke to her daughter today, her caregiver.  Says Ms Mayorga has been having stomach issues for the last 1-2 weeks.  She was recently started on Aricept, was already on Namenda per her neurologist, has a dx of parkinson's disease. Says the N/V/D is intermittent, doesn't think she had fever.  I asked her to stop the Aricept for now, let her neurologist know.  Can have pedialyte to drink then advance as tolerated.  Please update us via my chart how she is doing in the next 2 weeks.  To ER for any worse s/s

## 2019-10-10 ENCOUNTER — OFFICE VISIT (OUTPATIENT)
Dept: INTERNAL MEDICINE | Facility: CLINIC | Age: 69
End: 2019-10-10
Payer: MEDICARE

## 2019-10-10 VITALS
OXYGEN SATURATION: 98 % | HEIGHT: 64 IN | TEMPERATURE: 99 F | SYSTOLIC BLOOD PRESSURE: 148 MMHG | BODY MASS INDEX: 28.46 KG/M2 | WEIGHT: 166.69 LBS | HEART RATE: 99 BPM | DIASTOLIC BLOOD PRESSURE: 74 MMHG

## 2019-10-10 DIAGNOSIS — R11.2 NON-INTRACTABLE VOMITING WITH NAUSEA, UNSPECIFIED VOMITING TYPE: Primary | ICD-10-CM

## 2019-10-10 PROCEDURE — 99999 PR PBB SHADOW E&M-EST. PATIENT-LVL III: ICD-10-PCS | Mod: PBBFAC,,, | Performed by: FAMILY MEDICINE

## 2019-10-10 PROCEDURE — 99213 PR OFFICE/OUTPT VISIT, EST, LEVL III, 20-29 MIN: ICD-10-PCS | Mod: S$PBB,,, | Performed by: FAMILY MEDICINE

## 2019-10-10 PROCEDURE — 99999 PR PBB SHADOW E&M-EST. PATIENT-LVL III: CPT | Mod: PBBFAC,,, | Performed by: FAMILY MEDICINE

## 2019-10-10 PROCEDURE — 99213 OFFICE O/P EST LOW 20 MIN: CPT | Mod: PBBFAC,PO | Performed by: FAMILY MEDICINE

## 2019-10-10 PROCEDURE — 99213 OFFICE O/P EST LOW 20 MIN: CPT | Mod: S$PBB,,, | Performed by: FAMILY MEDICINE

## 2019-10-10 RX ORDER — ONDANSETRON 4 MG/1
4 TABLET, ORALLY DISINTEGRATING ORAL EVERY 8 HOURS PRN
Qty: 20 TABLET | Refills: 1 | Status: SHIPPED | OUTPATIENT
Start: 2019-10-10 | End: 2020-10-22

## 2019-10-12 NOTE — PROGRESS NOTES
Subjective:      Patient ID: Mari Mayorga is a 69 y.o. female.    Chief Complaint: Nausea and Emesis      Patient is here with his daughter today, reports nausea and vomiting for the past 2 weeks.  Denies diarrhea or any abdominal pain. Immediately prior to this starting, she was started on Aricept by her PCP Dr. Sauceda.  Her daughter is wondering if this could be causing her gastrointestinal discomfort.  She did stop the pill yesterday, felt better during the day but did vomit again once last night.    Review of Systems   Constitutional: Positive for appetite change and fatigue. Negative for activity change and fever.   Gastrointestinal: Positive for nausea and vomiting. Negative for abdominal pain, blood in stool, constipation and diarrhea.     Past Medical History:   Diagnosis Date    Breast cancer     CAD (coronary artery disease)     s/p stent 2016    Chronic constipation     Dementia     GERD (gastroesophageal reflux disease)     History of breast cancer     bilateral breast, XRT both times, no ctx    History of CVA (cerebrovascular accident)     Hyperlipidemia associated with type 2 diabetes mellitus     Hypertension associated with diabetes     Major depression, recurrent     Parkinson disease     Type II diabetes mellitus with complication      Past Surgical History:   Procedure Laterality Date    ADENOIDECTOMY      BACK SURGERY      BREAST LUMPECTOMY      CHOLECYSTECTOMY      HYSTERECTOMY      TONSILLECTOMY       Family History   Problem Relation Age of Onset    Diabetes Mother      Social History     Socioeconomic History    Marital status:      Spouse name: Not on file    Number of children: Not on file    Years of education: Not on file    Highest education level: Not on file   Occupational History    Not on file   Social Needs    Financial resource strain: Not on file    Food insecurity:     Worry: Not on file     Inability: Not on file    Transportation  "needs:     Medical: Not on file     Non-medical: Not on file   Tobacco Use    Smoking status: Former Smoker    Smokeless tobacco: Never Used   Substance and Sexual Activity    Alcohol use: No    Drug use: No    Sexual activity: Never   Lifestyle    Physical activity:     Days per week: Not on file     Minutes per session: Not on file    Stress: Not on file   Relationships    Social connections:     Talks on phone: Not on file     Gets together: Not on file     Attends L[employer]-day service: Not on file     Active member of club or organization: Not on file     Attends meetings of clubs or organizations: Not on file     Relationship status: Not on file   Other Topics Concern    Not on file   Social History Narrative    Not on file     Review of patient's allergies indicates:   Allergen Reactions    Morphine Hives       Objective:       BP (!) 148/74 (BP Location: Right arm)   Pulse 99   Temp 98.6 °F (37 °C) (Tympanic)   Ht 5' 4" (1.626 m)   Wt 75.6 kg (166 lb 10.7 oz)   SpO2 98%   BMI 28.61 kg/m²   Physical Exam   Constitutional: She appears well-developed and well-nourished. No distress.   Cardiovascular: Normal rate, regular rhythm and normal heart sounds.   Pulmonary/Chest: Effort normal and breath sounds normal. No respiratory distress.   Abdominal: Soft. Bowel sounds are normal. She exhibits no mass. There is no tenderness. There is no guarding.   Skin: She is not diaphoretic.   Vitals reviewed.    Assessment:     1. Non-intractable vomiting with nausea, unspecified vomiting type      Plan:   Non-intractable vomiting with nausea, unspecified vomiting type    Other orders  -     ondansetron (ZOFRAN-ODT) 4 MG TbDL; Take 1 tablet (4 mg total) by mouth every 8 (eight) hours as needed (nausea).  Dispense: 20 tablet; Refill: 1     Discussed with daughter and patient that I would recommend they continue to hold the Aricept for now and see if her symptoms improve.  Medication List with Changes/Refills   New " Medications    ONDANSETRON (ZOFRAN-ODT) 4 MG TBDL    Take 1 tablet (4 mg total) by mouth every 8 (eight) hours as needed (nausea).   Current Medications    AMITIZA 24 MCG CAP    TAKE 1 CAPSULE BY MOUTH  DAILY WITH A MEAL    AMLODIPINE (NORVASC) 2.5 MG TABLET    Take 1 tablet (2.5 mg total) by mouth once daily.    ASPIRIN (ECOTRIN) 81 MG EC TABLET    Take 81 mg by mouth.    BLOOD SUGAR DIAGNOSTIC STRP    Check blood sugar (4) times daily    BLOOD-GLUCOSE METER (CLEVER CHOICE BLOOD GLUC SYS) MISC        BUPROPION (WELLBUTRIN XL) 300 MG 24 HR TABLET    TAKE 1 TABLET BY MOUTH ONCE DAILY    CARBIDOPA-LEVODOPA  MG (SINEMET)  MG PER TABLET    Take 2 tablets by mouth 4 (four) times daily. Takes 2 1/2 pills by mouth four times daily    GLIMEPIRIDE (AMARYL) 4 MG TABLET    TAKE 1 TABLET BY MOUTH  DAILY WITH BREAKFAST    INSULIN SYRINGES, DISPOSABLE, 1 ML SYRG    Inject into the skin.    LANCETS 28 GAUGE MISC    Inject 1 applicator into the skin.    LANCING DEVICE (LANCING DEVICE WITH LANCETS) MISC        LIRAGLUTIDE 0.6 MG/0.1 ML, 18 MG/3 ML, SUBQ PNIJ (VICTOZA 3-MADDY) 0.6 MG/0.1 ML (18 MG/3 ML) PNIJ    Inject 1.8 mg into the skin once daily.    LISINOPRIL (PRINIVIL,ZESTRIL) 40 MG TABLET    Take 1 tablet by mouth once daily.    MEMANTINE (NAMENDA) 10 MG TAB    Take 1 tablet (10 mg total) by mouth 2 (two) times daily.    METFORMIN (GLUCOPHAGE) 500 MG TABLET    TAKE 1 TABLET (500 MG TOTAL) BY MOUTH 2 (TWO) TIMES DAILY WITH MEALS.    PANTOPRAZOLE (PROTONIX) 40 MG TABLET    TAKE 1 TABLET BY MOUTH ONCE DAILY   Discontinued Medications    DONEPEZIL (ARICEPT) 10 MG TABLET    Take 1 tablet (10 mg total) by mouth every evening.

## 2019-11-08 DIAGNOSIS — E11.9 TYPE 2 DIABETES MELLITUS WITHOUT COMPLICATION: ICD-10-CM

## 2019-11-22 RX ORDER — LUBIPROSTONE 24 UG/1
CAPSULE, GELATIN COATED ORAL
Qty: 90 CAPSULE | Refills: 3 | Status: SHIPPED | OUTPATIENT
Start: 2019-11-22 | End: 2020-07-28 | Stop reason: SDUPTHER

## 2020-01-30 RX ORDER — BUPROPION HYDROCHLORIDE 300 MG/1
TABLET ORAL
Qty: 90 TABLET | Refills: 3 | Status: SHIPPED | OUTPATIENT
Start: 2020-01-30 | End: 2020-07-28 | Stop reason: SDUPTHER

## 2020-02-17 RX ORDER — METFORMIN HYDROCHLORIDE 500 MG/1
500 TABLET ORAL 2 TIMES DAILY WITH MEALS
Qty: 180 TABLET | Refills: 3 | Status: SHIPPED | OUTPATIENT
Start: 2020-02-17 | End: 2020-02-24 | Stop reason: SDUPTHER

## 2020-02-24 RX ORDER — METFORMIN HYDROCHLORIDE 500 MG/1
500 TABLET ORAL 2 TIMES DAILY WITH MEALS
Qty: 180 TABLET | Refills: 3 | Status: SHIPPED | OUTPATIENT
Start: 2020-02-24 | End: 2020-06-09 | Stop reason: SDUPTHER

## 2020-03-04 RX ORDER — PANTOPRAZOLE SODIUM 40 MG/1
TABLET, DELAYED RELEASE ORAL
Qty: 90 TABLET | Refills: 3 | Status: SHIPPED | OUTPATIENT
Start: 2020-03-04 | End: 2020-07-28 | Stop reason: SDUPTHER

## 2020-03-06 ENCOUNTER — HOSPITAL ENCOUNTER (EMERGENCY)
Facility: HOSPITAL | Age: 70
Discharge: HOME | End: 2020-03-06
Attending: EMERGENCY MEDICINE
Payer: MEDICARE

## 2020-03-06 ENCOUNTER — OFFICE VISIT (OUTPATIENT)
Dept: INTERNAL MEDICINE | Facility: CLINIC | Age: 70
End: 2020-03-06
Payer: MEDICARE

## 2020-03-06 VITALS
HEART RATE: 91 BPM | HEIGHT: 64 IN | DIASTOLIC BLOOD PRESSURE: 72 MMHG | TEMPERATURE: 98 F | OXYGEN SATURATION: 97 % | SYSTOLIC BLOOD PRESSURE: 132 MMHG | WEIGHT: 136.88 LBS | BODY MASS INDEX: 23.37 KG/M2

## 2020-03-06 VITALS
RESPIRATION RATE: 15 BRPM | OXYGEN SATURATION: 95 % | WEIGHT: 133.81 LBS | BODY MASS INDEX: 22.97 KG/M2 | DIASTOLIC BLOOD PRESSURE: 81 MMHG | HEART RATE: 98 BPM | TEMPERATURE: 98 F | SYSTOLIC BLOOD PRESSURE: 182 MMHG

## 2020-03-06 DIAGNOSIS — R53.1 WEAKNESS: ICD-10-CM

## 2020-03-06 DIAGNOSIS — R82.4 URINE KETONES: ICD-10-CM

## 2020-03-06 DIAGNOSIS — R53.81 DEBILITATED: ICD-10-CM

## 2020-03-06 DIAGNOSIS — R41.82 ALTERED MENTAL STATE: ICD-10-CM

## 2020-03-06 DIAGNOSIS — R63.4 WEIGHT LOSS, UNINTENTIONAL: ICD-10-CM

## 2020-03-06 DIAGNOSIS — R53.1 WEAKNESS: Primary | ICD-10-CM

## 2020-03-06 DIAGNOSIS — N39.0 ACUTE LOWER UTI: Primary | ICD-10-CM

## 2020-03-06 LAB
ALBUMIN SERPL BCP-MCNC: 3.8 G/DL (ref 3.5–5.2)
ALP SERPL-CCNC: 64 U/L (ref 55–135)
ALT SERPL W/O P-5'-P-CCNC: <5 U/L (ref 10–44)
ANION GAP SERPL CALC-SCNC: 14 MMOL/L (ref 8–16)
APTT BLDCRRT: 25.4 SEC (ref 21–32)
AST SERPL-CCNC: 9 U/L (ref 10–40)
BACTERIA #/AREA URNS HPF: ABNORMAL /HPF
BASOPHILS # BLD AUTO: 0.04 K/UL (ref 0–0.2)
BASOPHILS NFR BLD: 0.5 % (ref 0–1.9)
BILIRUB SERPL-MCNC: 0.8 MG/DL (ref 0.1–1)
BILIRUB SERPL-MCNC: ABNORMAL MG/DL
BILIRUB UR QL STRIP: ABNORMAL
BLOOD URINE, POC: NEGATIVE
BNP SERPL-MCNC: <10 PG/ML (ref 0–99)
BUN SERPL-MCNC: 18 MG/DL (ref 8–23)
CALCIUM SERPL-MCNC: 9.3 MG/DL (ref 8.7–10.5)
CHLORIDE SERPL-SCNC: 98 MMOL/L (ref 95–110)
CLARITY UR: CLEAR
CO2 SERPL-SCNC: 27 MMOL/L (ref 23–29)
COLOR UR: YELLOW
COLOR, POC UA: ABNORMAL
CREAT SERPL-MCNC: 0.8 MG/DL (ref 0.5–1.4)
DIFFERENTIAL METHOD: ABNORMAL
EOSINOPHIL # BLD AUTO: 0.1 K/UL (ref 0–0.5)
EOSINOPHIL NFR BLD: 1.5 % (ref 0–8)
ERYTHROCYTE [DISTWIDTH] IN BLOOD BY AUTOMATED COUNT: 13.2 % (ref 11.5–14.5)
EST. GFR  (AFRICAN AMERICAN): >60 ML/MIN/1.73 M^2
EST. GFR  (NON AFRICAN AMERICAN): >60 ML/MIN/1.73 M^2
GLUCOSE SERPL-MCNC: 143 MG/DL (ref 70–110)
GLUCOSE SERPL-MCNC: 143 MG/DL (ref 70–110)
GLUCOSE UR QL STRIP: NEGATIVE
GLUCOSE UR QL STRIP: NORMAL
HCT VFR BLD AUTO: 45.9 % (ref 37–48.5)
HCV AB SERPL QL IA: NEGATIVE
HGB BLD-MCNC: 15 G/DL (ref 12–16)
HGB UR QL STRIP: NEGATIVE
IMM GRANULOCYTES # BLD AUTO: 0.02 K/UL (ref 0–0.04)
IMM GRANULOCYTES NFR BLD AUTO: 0.3 % (ref 0–0.5)
INR PPP: 1.1 (ref 0.8–1.2)
KETONES UR QL STRIP: ABNORMAL
KETONES UR QL STRIP: ABNORMAL
LEUKOCYTE ESTERASE UR QL STRIP: ABNORMAL
LEUKOCYTE ESTERASE URINE, POC: ABNORMAL
LYMPHOCYTES # BLD AUTO: 1 K/UL (ref 1–4.8)
LYMPHOCYTES NFR BLD: 14.1 % (ref 18–48)
MAGNESIUM SERPL-MCNC: 1.4 MG/DL (ref 1.6–2.6)
MCH RBC QN AUTO: 29.5 PG (ref 27–31)
MCHC RBC AUTO-ENTMCNC: 32.7 G/DL (ref 32–36)
MCV RBC AUTO: 90 FL (ref 82–98)
MICROSCOPIC COMMENT: ABNORMAL
MONOCYTES # BLD AUTO: 0.5 K/UL (ref 0.3–1)
MONOCYTES NFR BLD: 6.2 % (ref 4–15)
NEUTROPHILS # BLD AUTO: 5.7 K/UL (ref 1.8–7.7)
NEUTROPHILS NFR BLD: 77.4 % (ref 38–73)
NITRITE UR QL STRIP: NEGATIVE
NITRITE, POC UA: NEGATIVE
NRBC BLD-RTO: 0 /100 WBC
PH UR STRIP: 6 [PH] (ref 5–8)
PH, POC UA: 6
PHOSPHATE SERPL-MCNC: 2.7 MG/DL (ref 2.7–4.5)
PLATELET # BLD AUTO: 250 K/UL (ref 150–350)
PMV BLD AUTO: 10.9 FL (ref 9.2–12.9)
POCT GLUCOSE: 139 MG/DL (ref 70–110)
POTASSIUM SERPL-SCNC: 3.1 MMOL/L (ref 3.5–5.1)
PROT SERPL-MCNC: 6.7 G/DL (ref 6–8.4)
PROT UR QL STRIP: NEGATIVE
PROTEIN, POC: ABNORMAL
PROTHROMBIN TIME: 11.3 SEC (ref 9–12.5)
RBC # BLD AUTO: 5.08 M/UL (ref 4–5.4)
SODIUM SERPL-SCNC: 139 MMOL/L (ref 136–145)
SP GR UR STRIP: 1.02 (ref 1–1.03)
SPECIFIC GRAVITY, POC UA: 1.02
SQUAMOUS #/AREA URNS HPF: 10 /HPF
TROPONIN I SERPL DL<=0.01 NG/ML-MCNC: 0.01 NG/ML (ref 0–0.03)
URN SPEC COLLECT METH UR: ABNORMAL
UROBILINOGEN UR STRIP-ACNC: NEGATIVE EU/DL
UROBILINOGEN, POC UA: 4
WBC # BLD AUTO: 7.4 K/UL (ref 3.9–12.7)
WBC #/AREA URNS HPF: 11 /HPF (ref 0–5)

## 2020-03-06 PROCEDURE — 81002 URINALYSIS NONAUTO W/O SCOPE: CPT | Mod: PBBFAC,PO | Performed by: FAMILY MEDICINE

## 2020-03-06 PROCEDURE — 25000003 PHARM REV CODE 250: Performed by: EMERGENCY MEDICINE

## 2020-03-06 PROCEDURE — 85730 THROMBOPLASTIN TIME PARTIAL: CPT

## 2020-03-06 PROCEDURE — 99214 PR OFFICE/OUTPT VISIT, EST, LEVL IV, 30-39 MIN: ICD-10-PCS | Mod: S$PBB,,, | Performed by: FAMILY MEDICINE

## 2020-03-06 PROCEDURE — 93005 ELECTROCARDIOGRAM TRACING: CPT

## 2020-03-06 PROCEDURE — 81000 URINALYSIS NONAUTO W/SCOPE: CPT

## 2020-03-06 PROCEDURE — 85610 PROTHROMBIN TIME: CPT

## 2020-03-06 PROCEDURE — 36415 COLL VENOUS BLD VENIPUNCTURE: CPT

## 2020-03-06 PROCEDURE — 99213 OFFICE O/P EST LOW 20 MIN: CPT | Mod: PBBFAC,25,PO | Performed by: FAMILY MEDICINE

## 2020-03-06 PROCEDURE — 86803 HEPATITIS C AB TEST: CPT

## 2020-03-06 PROCEDURE — 87086 URINE CULTURE/COLONY COUNT: CPT | Mod: 59

## 2020-03-06 PROCEDURE — 84100 ASSAY OF PHOSPHORUS: CPT

## 2020-03-06 PROCEDURE — 83880 ASSAY OF NATRIURETIC PEPTIDE: CPT

## 2020-03-06 PROCEDURE — 96365 THER/PROPH/DIAG IV INF INIT: CPT

## 2020-03-06 PROCEDURE — 93010 EKG 12-LEAD: ICD-10-PCS | Mod: ,,, | Performed by: INTERNAL MEDICINE

## 2020-03-06 PROCEDURE — 99214 OFFICE O/P EST MOD 30 MIN: CPT | Mod: S$PBB,,, | Performed by: FAMILY MEDICINE

## 2020-03-06 PROCEDURE — 63600175 PHARM REV CODE 636 W HCPCS: Performed by: EMERGENCY MEDICINE

## 2020-03-06 PROCEDURE — 87086 URINE CULTURE/COLONY COUNT: CPT

## 2020-03-06 PROCEDURE — 83735 ASSAY OF MAGNESIUM: CPT

## 2020-03-06 PROCEDURE — 84484 ASSAY OF TROPONIN QUANT: CPT

## 2020-03-06 PROCEDURE — 80053 COMPREHEN METABOLIC PANEL: CPT

## 2020-03-06 PROCEDURE — 93010 ELECTROCARDIOGRAM REPORT: CPT | Mod: ,,, | Performed by: INTERNAL MEDICINE

## 2020-03-06 PROCEDURE — 85025 COMPLETE CBC W/AUTO DIFF WBC: CPT

## 2020-03-06 PROCEDURE — 99999 PR PBB SHADOW E&M-EST. PATIENT-LVL III: CPT | Mod: PBBFAC,,, | Performed by: FAMILY MEDICINE

## 2020-03-06 PROCEDURE — 99284 EMERGENCY DEPT VISIT MOD MDM: CPT | Mod: 25,27

## 2020-03-06 PROCEDURE — 99999 PR PBB SHADOW E&M-EST. PATIENT-LVL III: ICD-10-PCS | Mod: PBBFAC,,, | Performed by: FAMILY MEDICINE

## 2020-03-06 PROCEDURE — 82962 GLUCOSE BLOOD TEST: CPT | Mod: PBBFAC,PO | Performed by: FAMILY MEDICINE

## 2020-03-06 RX ORDER — LISINOPRIL 20 MG/1
40 TABLET ORAL
Status: COMPLETED | OUTPATIENT
Start: 2020-03-06 | End: 2020-03-06

## 2020-03-06 RX ORDER — CEPHALEXIN 500 MG/1
500 CAPSULE ORAL EVERY 8 HOURS
Qty: 21 CAPSULE | Refills: 0 | Status: SHIPPED | OUTPATIENT
Start: 2020-03-06 | End: 2020-03-13

## 2020-03-06 RX ADMIN — CEFTRIAXONE 1 G: 1 INJECTION, SOLUTION INTRAVENOUS at 06:03

## 2020-03-06 RX ADMIN — SODIUM CHLORIDE 1000 ML: 0.9 INJECTION, SOLUTION INTRAVENOUS at 06:03

## 2020-03-06 RX ADMIN — LISINOPRIL 40 MG: 20 TABLET ORAL at 06:03

## 2020-03-06 NOTE — ED PROVIDER NOTES
3/6/2020, 2:38 PM   History obtained from the patient      History of Present Illness: Mari Mayorga is a 70 y.o. female patient who presents to the Emergency Department for weakness, confusion and fatigue which onset gradually 2 weeks ago.    2:38 PM: [facility] I explained to pt that due to lack of available rooms pt was seen by myself to begin the workup. Pt understands they will be seen and dispositioned by the next available physician. Pt voices understanding and agrees with plan. Pt also understands the longer than normal wait time. I am removing myself from the care of pt and pt will now be assigned to the next available physician.     ALON Velazquez FNP-C      SCRIBE #1 NOTE: I, Shu Becker, am scribing for, and in the presence of, Reji Khun Jr., MD. I have scribed the entire note.       History     Chief Complaint   Patient presents with    Altered Mental Status     c/o weakness, fatigue +confusion and difficulity walking      Review of patient's allergies indicates:   Allergen Reactions    Morphine Hives    Donepezil Nausea And Vomiting         History of Present Illness     HPI    3/6/2020, 4:33 PM  History obtained from the daughter and patient   History limited secondary to AMS      History of Present Illness: Mari Mayorga is a 70 y.o. female patient with a PMHx of breast cancer, CAD, early-onset dementia, GERD, CVA, HLD, HTN, Parkinson's disease, and DM who presents to the Emergency Department for evaluation of AMS which onset gradually. Daughter states patient's mental status has been declining for a while but symptoms suddenly worsened this week. Symptoms are worsening and moderate in severity. No mitigating or exacerbating factors reported. Associated sxs include confusion, generalized weakness, decreased appetite, and n/v. Daughter and patient deny any CP, palpitations, SOB, cough, rhinorrhea, sore throat, fever, chills, dysuria, hematuria, abdominal  pain, and all other sxs at this time. No prior Tx. Patient was seen by Dr. Holguin today and told to come to ED. No further complaints or concerns at this time.       Arrival mode: Personal vehicle    PCP: Rob Sauceda MD        Past Medical History:  Past Medical History:   Diagnosis Date    Breast cancer     CAD (coronary artery disease)     s/p stent 2016    Chronic constipation     Dementia     GERD (gastroesophageal reflux disease)     History of breast cancer     bilateral breast, XRT both times, no ctx    History of CVA (cerebrovascular accident)     Hyperlipidemia associated with type 2 diabetes mellitus     Hypertension associated with diabetes     Major depression, recurrent     Parkinson disease     Type II diabetes mellitus with complication        Past Surgical History:  Past Surgical History:   Procedure Laterality Date    ADENOIDECTOMY      BACK SURGERY      BREAST LUMPECTOMY      CHOLECYSTECTOMY      HYSTERECTOMY      TONSILLECTOMY           Family History:  Family History   Problem Relation Age of Onset    Diabetes Mother        Social History:  Social History     Tobacco Use    Smoking status: Former Smoker    Smokeless tobacco: Never Used   Substance and Sexual Activity    Alcohol use: No    Drug use: No    Sexual activity: Never        Review of Systems     Review of Systems   Constitutional: Positive for appetite change (decreased). Negative for activity change, chills, diaphoresis, fatigue and fever.        (+) generalized weakness   HENT: Negative for congestion, ear pain, nosebleeds, rhinorrhea, sinus pain, sore throat and trouble swallowing.    Eyes: Negative for pain and discharge.   Respiratory: Negative for cough, chest tightness, shortness of breath, wheezing and stridor.    Cardiovascular: Negative for chest pain, palpitations and leg swelling.   Gastrointestinal: Positive for nausea and vomiting. Negative for abdominal distention, abdominal pain, blood in  stool, constipation and diarrhea.   Genitourinary: Negative for difficulty urinating, dysuria, flank pain, frequency, hematuria and urgency.   Musculoskeletal: Negative for arthralgias, back pain, myalgias and neck pain.   Skin: Negative for pallor, rash and wound.   Neurological: Negative for dizziness, syncope, weakness, light-headedness, numbness and headaches.   Hematological: Does not bruise/bleed easily.   Psychiatric/Behavioral: Positive for confusion. Negative for self-injury.   All other systems reviewed and are negative.     Physical Exam     Initial Vitals   BP Pulse Resp Temp SpO2   03/06/20 1435 03/06/20 1435 03/06/20 1435 03/06/20 1435 03/06/20 1631   (!) 147/67 95 18 98 °F (36.7 °C) 97 %      MAP       --                 Physical Exam  Nursing Notes and Vital Signs Reviewed.  Constitutional: Patient is in no acute distress. Well-developed and well-nourished.  Head: Atraumatic. Normocephalic.  Eyes: PERRL. EOM intact. Conjunctivae are not pale. No scleral icterus.  ENT: Mucous membranes are moist. Oropharynx is clear and symmetric.    Neck: Supple. Full ROM. No lymphadenopathy.  Cardiovascular: Regular rate. Regular rhythm. No murmurs, rubs, or gallops. Distal pulses are 2+ and symmetric.  Pulmonary/Chest: No respiratory distress. Clear to auscultation bilaterally. No wheezing or rales.  Abdominal: Soft and non-distended.  There is no tenderness.  No rebound, guarding, or rigidity. Good bowel sounds.  Genitourinary: No CVA tenderness  Musculoskeletal: Moves all extremities. No obvious deformities. No edema. No calf tenderness.  Skin: Warm and dry.  Neurological:  Alert, awake, and appropriate.  Normal speech.  No acute focal neurological deficits are appreciated.  Psychiatric: Normal affect. Good eye contact. Appropriate in content.     ED Course   Procedures  ED Vital Signs:  Vitals:    03/06/20 1435 03/06/20 1610 03/06/20 1631   BP: (!) 147/67  (!) 163/66   Pulse: 95 95 92   Resp: 18  14   Temp: 98  °F (36.7 °C)     TempSrc: Oral     SpO2:   97%   Weight: 60.7 kg (133 lb 13.1 oz)         Abnormal Lab Results:  Labs Reviewed   CBC W/ AUTO DIFFERENTIAL - Abnormal; Notable for the following components:       Result Value    Gran% 77.4 (*)     Lymph% 14.1 (*)     All other components within normal limits   COMPREHENSIVE METABOLIC PANEL - Abnormal; Notable for the following components:    Potassium 3.1 (*)     Glucose 143 (*)     AST 9 (*)     ALT <5 (*)     All other components within normal limits   MAGNESIUM - Abnormal; Notable for the following components:    Magnesium 1.4 (*)     All other components within normal limits   URINALYSIS, REFLEX TO URINE CULTURE - Abnormal; Notable for the following components:    Ketones, UA 1+ (*)     Bilirubin (UA) 1+ (*)     Leukocytes, UA Trace (*)     All other components within normal limits    Narrative:     Preferred Collection Type->Urine, Clean Catch   URINALYSIS MICROSCOPIC - Abnormal; Notable for the following components:    WBC, UA 11 (*)     Bacteria Many (*)     All other components within normal limits    Narrative:     Preferred Collection Type->Urine, Clean Catch   POCT GLUCOSE - Abnormal; Notable for the following components:    POCT Glucose 139 (*)     All other components within normal limits   CULTURE, URINE   HEPATITIS C ANTIBODY   PROTIME-INR   APTT   B-TYPE NATRIURETIC PEPTIDE   TROPONIN I   PHOSPHORUS        All Lab Results:  Results for orders placed or performed during the hospital encounter of 03/06/20   Hepatitis C antibody   Result Value Ref Range    Hepatitis C Ab Negative Negative   CBC auto differential   Result Value Ref Range    WBC 7.40 3.90 - 12.70 K/uL    RBC 5.08 4.00 - 5.40 M/uL    Hemoglobin 15.0 12.0 - 16.0 g/dL    Hematocrit 45.9 37.0 - 48.5 %    Mean Corpuscular Volume 90 82 - 98 fL    Mean Corpuscular Hemoglobin 29.5 27.0 - 31.0 pg    Mean Corpuscular Hemoglobin Conc 32.7 32.0 - 36.0 g/dL    RDW 13.2 11.5 - 14.5 %    Platelets 250 150  - 350 K/uL    MPV 10.9 9.2 - 12.9 fL    Immature Granulocytes 0.3 0.0 - 0.5 %    Gran # (ANC) 5.7 1.8 - 7.7 K/uL    Immature Grans (Abs) 0.02 0.00 - 0.04 K/uL    Lymph # 1.0 1.0 - 4.8 K/uL    Mono # 0.5 0.3 - 1.0 K/uL    Eos # 0.1 0.0 - 0.5 K/uL    Baso # 0.04 0.00 - 0.20 K/uL    nRBC 0 0 /100 WBC    Gran% 77.4 (H) 38.0 - 73.0 %    Lymph% 14.1 (L) 18.0 - 48.0 %    Mono% 6.2 4.0 - 15.0 %    Eosinophil% 1.5 0.0 - 8.0 %    Basophil% 0.5 0.0 - 1.9 %    Differential Method Automated    Comprehensive metabolic panel   Result Value Ref Range    Sodium 139 136 - 145 mmol/L    Potassium 3.1 (L) 3.5 - 5.1 mmol/L    Chloride 98 95 - 110 mmol/L    CO2 27 23 - 29 mmol/L    Glucose 143 (H) 70 - 110 mg/dL    BUN, Bld 18 8 - 23 mg/dL    Creatinine 0.8 0.5 - 1.4 mg/dL    Calcium 9.3 8.7 - 10.5 mg/dL    Total Protein 6.7 6.0 - 8.4 g/dL    Albumin 3.8 3.5 - 5.2 g/dL    Total Bilirubin 0.8 0.1 - 1.0 mg/dL    Alkaline Phosphatase 64 55 - 135 U/L    AST 9 (L) 10 - 40 U/L    ALT <5 (L) 10 - 44 U/L    Anion Gap 14 8 - 16 mmol/L    eGFR if African American >60 >60 mL/min/1.73 m^2    eGFR if non African American >60 >60 mL/min/1.73 m^2   Protime-INR   Result Value Ref Range    Prothrombin Time 11.3 9.0 - 12.5 sec    INR 1.1 0.8 - 1.2   APTT   Result Value Ref Range    aPTT 25.4 21.0 - 32.0 sec   Brain natriuretic peptide   Result Value Ref Range    BNP <10 0 - 99 pg/mL   Troponin I   Result Value Ref Range    Troponin I 0.015 0.000 - 0.026 ng/mL   Magnesium   Result Value Ref Range    Magnesium 1.4 (L) 1.6 - 2.6 mg/dL   Phosphorus   Result Value Ref Range    Phosphorus 2.7 2.7 - 4.5 mg/dL   Urinalysis, Reflex to Urine Culture Urine, Clean Catch   Result Value Ref Range    Specimen UA Urine, Clean Catch     Color, UA Yellow Yellow, Straw, Darleen    Appearance, UA Clear Clear    pH, UA 6.0 5.0 - 8.0    Specific Gravity, UA 1.025 1.005 - 1.030    Protein, UA Negative Negative    Glucose, UA Negative Negative    Ketones, UA 1+ (A) Negative     Bilirubin (UA) 1+ (A) Negative    Occult Blood UA Negative Negative    Nitrite, UA Negative Negative    Urobilinogen, UA Negative <2.0 EU/dL    Leukocytes, UA Trace (A) Negative   Urinalysis Microscopic   Result Value Ref Range    WBC, UA 11 (H) 0 - 5 /hpf    Bacteria Many (A) None-Occ /hpf    Squam Epithel, UA 10 /hpf    Microscopic Comment SEE COMMENT    POCT glucose   Result Value Ref Range    POCT Glucose 139 (H) 70 - 110 mg/dL         Imaging Results:  Imaging Results          CT Head Without Contrast (Final result)  Result time 03/06/20 16:12:56    Final result by FRAN Young Sr., MD (03/06/20 16:12:56)                 Impression:      1. There are moderate subacute to chronic appearing ischemic changes in the deep white matter of both cerebral hemispheres. There are chronic appearing ischemic changes in the gabriela.  2. There is no intracranial hemorrhage.  All CT scans at this facility use dose modulation, iterative reconstruction, and/or weight base dosing when appropriate to reduce radiation dose when appropriate to reduce radiation dose to as low as reasonably achievable.      Electronically signed by: Matthew Young MD  Date:    03/06/2020  Time:    16:12             Narrative:    EXAMINATION:  CT HEAD WITHOUT CONTRAST    CLINICAL HISTORY:  Confusion/delirium, altered LOC, unexplained;    TECHNIQUE:  Standard brain CT protocol without IV contrast was performed.    COMPARISON:  None    FINDINGS:  There are moderate subacute to chronic appearing ischemic changes in the deep white matter of both cerebral hemispheres.  There are chronic appearing ischemic changes in the gabriela.  There is no intracranial hemorrhage.  There is no calvarial fracture.  The visualized portion of the paranasal sinuses is clear.                                        The Emergency Provider reviewed the vital signs and test results, which are outlined above.     ED Discussion     6:11 PM: Patient remains stable nontoxic.   Evaluation is negative.  She does have mildly elevated heart rate from what I would expect.  This may be all related to some mild dehydration.  She is receiving fluids for this I will reassess after they are done.  Patient is stable.  Family is aware of her current condition as well as the plan of care.    6:13 PM: Cell counts are back on the urine.  Patient appears have a mild urinary tract infection.  Will treat with Rocephin and Keflex at home.  I have discussed this with the daughter. They are aware of the diagnosis and plan of care.    6:25 PM: Reassessed pt at this time. Discussed with pt all pertinent ED information and results. Discussed pt dx and plan of tx. Gave pt all f/u and return to the ED instructions. All questions and concerns were addressed at this time. Pt expresses understanding of information and instructions, and is comfortable with plan to discharge. Pt is stable for discharge.    I discussed with patient and/or family/caretaker that evaluation in the ED does not suggest any emergent or life threatening medical conditions requiring immediate intervention beyond what was provided in the ED, and I believe patient is safe for discharge.  Regardless, an unremarkable evaluation in the ED does not preclude the development or presence of a serious of life threatening condition. As such, patient was instructed to return immediately for any worsening or change in current symptoms.    6:52 PM  Patient is stable and nontoxic.  Patient has a mild lower UTI with mild generalized weakness.  She is stable safe for discharge in my opinion.  Discussed with her all findings.  Patient verbalized understanding room with all instructions and seems reliable.     Medical Decision Making:   Clinical Tests:   Lab Tests: Ordered and Reviewed  Radiological Study: Ordered and Reviewed          ED Medication(s):  Medications   cefTRIAXone (ROCEPHIN) 1 g in dextrose 5 % 50 mL IVPB (has no administration in time range)    sodium chloride 0.9% bolus 1,000 mL (1,000 mLs Intravenous New Bag 3/6/20 9565)       New Prescriptions    CEPHALEXIN (KEFLEX) 500 MG CAPSULE    Take 1 capsule (500 mg total) by mouth every 8 (eight) hours. for 7 days       Follow-up Information     Rob Sauceda MD In 3 days.    Specialty:  Internal Medicine  Contact information:  [address]  [address]  [facility] [address]  [phone]                       Scribe Attestation:   Scribe #1: I performed the above scribed service and the documentation accurately describes the services I performed. I attest to the accuracy of the note.     Attending:   Physician Attestation Statement for Scribe #1: I, Reji Kuhn Jr., MD, personally performed the services described in this documentation, as scribed by Shu Becker, in my presence, and it is both accurate and complete.           Clinical Impression       ICD-10-CM ICD-9-CM   1. Acute lower UTI N39.0 599.0   2. Altered mental state R41.82 780.97   3. Weakness R53.1 780.79       Disposition:   Disposition: Discharged  Condition: Stable               Reji Kuhn Jr., MD  03/06/20 3230

## 2020-03-07 NOTE — DISCHARGE INSTRUCTIONS
You.  Have a mild urinary tract infection causing dehydration generalized weakness.  Continue all home medications.  Take Keflex as prescribed for infection.  Please start these tomorrow morning.  Follow up with her doctor on Monday for re-evaluation.  Drink plenty of fluids.  Return as needed for any worsening symptoms, problems, questions or concerns.

## 2020-03-08 LAB
BACTERIA UR CULT: NORMAL

## 2020-03-08 NOTE — PROGRESS NOTES
Subjective:      Patient ID: Mari Mayorga is a 70 y.o. female.    Chief Complaint: Fatigue      Patient has had increased weakness, confusion, nausea.  She has lost significant weight, about 30 lbs, in the past 4 months.  She says she is continuously nauseous and occasionally vomits, not able to tolerate much food.  She is also having foul-smelling urine.  Also increased swelling in both feet.  Her neurologist has been concerned about her debilitation and recently he has ordered home health for her.  Home health came out for the initial visit this morning and recommended she come here today.    Review of Systems   Constitutional: Positive for activity change, appetite change, fatigue and unexpected weight change. Negative for fever.   HENT: Negative for congestion, postnasal drip and rhinorrhea.    Respiratory: Negative for cough and shortness of breath.    Cardiovascular: Positive for leg swelling. Negative for chest pain and palpitations.   Gastrointestinal: Positive for abdominal pain, nausea and vomiting. Negative for constipation and diarrhea.   Genitourinary: Positive for frequency and urgency. Negative for dysuria and flank pain.   Psychiatric/Behavioral: Positive for confusion.     Past Medical History:   Diagnosis Date    Breast cancer     CAD (coronary artery disease)     s/p stent 2016    Chronic constipation     Dementia     GERD (gastroesophageal reflux disease)     History of breast cancer     bilateral breast, XRT both times, no ctx    History of CVA (cerebrovascular accident)     Hyperlipidemia associated with type 2 diabetes mellitus     Hypertension associated with diabetes     Major depression, recurrent     Parkinson disease     Type II diabetes mellitus with complication           Past Surgical History:   Procedure Laterality Date    ADENOIDECTOMY      BACK SURGERY      BREAST LUMPECTOMY      CHOLECYSTECTOMY      HYSTERECTOMY      TONSILLECTOMY       Family History  "  Problem Relation Age of Onset    Diabetes Mother      Social History     Socioeconomic History    Marital status:      Spouse name: Not on file    Number of children: Not on file    Years of education: Not on file    Highest education level: Not on file   Occupational History    Not on file   Social Needs    Financial resource strain: Not on file    Food insecurity:     Worry: Not on file     Inability: Not on file    Transportation needs:     Medical: Not on file     Non-medical: Not on file   Tobacco Use    Smoking status: Former Smoker    Smokeless tobacco: Never Used   Substance and Sexual Activity    Alcohol use: No    Drug use: No    Sexual activity: Never   Lifestyle    Physical activity:     Days per week: Not on file     Minutes per session: Not on file    Stress: Not on file   Relationships    Social connections:     Talks on phone: Not on file     Gets together: Not on file     Attends Yarsani service: Not on file     Active member of club or organization: Not on file     Attends meetings of clubs or organizations: Not on file     Relationship status: Not on file   Other Topics Concern    Not on file   Social History Narrative    Not on file     Review of patient's allergies indicates:   Allergen Reactions    Morphine Hives    Donepezil Nausea And Vomiting       Objective:       /72 (BP Location: Right arm)   Pulse 91   Temp 98 °F (36.7 °C) (Tympanic)   Ht 5' 4" (1.626 m)   Wt 62.1 kg (136 lb 14.5 oz)   SpO2 97%   BMI 23.50 kg/m²   Physical Exam   Constitutional: She appears well-developed and well-nourished. No distress.   Cardiovascular: Normal rate, regular rhythm and normal heart sounds.   Pulmonary/Chest: Effort normal and breath sounds normal. No respiratory distress.   Abdominal: Soft. Bowel sounds are normal. There is no tenderness. There is no guarding.   No CVA tenderness   Skin: She is not diaphoretic.   Psychiatric: She has a normal mood and affect. " Her behavior is normal.   Nursing note and vitals reviewed.    Assessment:     1. Weakness    2. Debilitated    3. Weight loss, unintentional    4. Urine ketones      Plan:   Weakness  -     POCT URINE DIPSTICK WITHOUT MICROSCOPE  -     Urine culture; Future; Expected date: 03/06/2020  -     POCT Glucose, Hand-Held Device    Debilitated    Weight loss, unintentional    Urine ketones     Recommended patient go to ER for further evaluation.  Medication List with Changes/Refills   New Medications    CEPHALEXIN (KEFLEX) 500 MG CAPSULE    Take 1 capsule (500 mg total) by mouth every 8 (eight) hours. for 7 days   Current Medications    AMITIZA 24 MCG CAP    TAKE 1 CAPSULE BY MOUTH  DAILY WITH A MEAL    AMLODIPINE (NORVASC) 2.5 MG TABLET    Take 1 tablet (2.5 mg total) by mouth once daily.    ASPIRIN (ECOTRIN) 81 MG EC TABLET    Take 81 mg by mouth.    BLOOD SUGAR DIAGNOSTIC STRP    Check blood sugar (4) times daily    BLOOD-GLUCOSE METER (CLEVER CHOICE BLOOD GLUC SYS) MISC        BUPROPION (WELLBUTRIN XL) 300 MG 24 HR TABLET    TAKE 1 TABLET BY MOUTH ONCE DAILY    CARBIDOPA-LEVODOPA  MG (SINEMET)  MG PER TABLET    Take 2 tablets by mouth 4 (four) times daily. Takes 2 1/2 pills by mouth four times daily    GLIMEPIRIDE (AMARYL) 4 MG TABLET    TAKE 1 TABLET BY MOUTH  DAILY WITH BREAKFAST    INSULIN SYRINGES, DISPOSABLE, 1 ML SYRG    Inject into the skin.    LANCETS 28 GAUGE MISC    Inject 1 applicator into the skin.    LANCING DEVICE (LANCING DEVICE WITH LANCETS) MISC        LIRAGLUTIDE 0.6 MG/0.1 ML, 18 MG/3 ML, SUBQ PNIJ (VICTOZA 3-MADDY) 0.6 MG/0.1 ML (18 MG/3 ML) PNIJ    Inject 1.8 mg into the skin once daily.    LISINOPRIL (PRINIVIL,ZESTRIL) 40 MG TABLET    Take 1 tablet by mouth once daily.    MEMANTINE (NAMENDA) 10 MG TAB    Take 1 tablet (10 mg total) by mouth 2 (two) times daily.    METFORMIN (GLUCOPHAGE) 500 MG TABLET    Take 1 tablet (500 mg total) by mouth 2 (two) times daily with meals.    ONDANSETRON  (ZOFRAN-ODT) 4 MG TBDL    Take 1 tablet (4 mg total) by mouth every 8 (eight) hours as needed (nausea).    PANTOPRAZOLE (PROTONIX) 40 MG TABLET    TAKE 1 TABLET BY MOUTH ONCE DAILY

## 2020-03-09 ENCOUNTER — PATIENT OUTREACH (OUTPATIENT)
Dept: ADMINISTRATIVE | Facility: OTHER | Age: 70
End: 2020-03-09

## 2020-03-09 ENCOUNTER — OFFICE VISIT (OUTPATIENT)
Dept: INTERNAL MEDICINE | Facility: CLINIC | Age: 70
End: 2020-03-09
Payer: MEDICARE

## 2020-03-09 VITALS
HEIGHT: 64 IN | DIASTOLIC BLOOD PRESSURE: 78 MMHG | OXYGEN SATURATION: 98 % | WEIGHT: 133 LBS | SYSTOLIC BLOOD PRESSURE: 138 MMHG | BODY MASS INDEX: 22.71 KG/M2 | HEART RATE: 92 BPM | TEMPERATURE: 98 F

## 2020-03-09 DIAGNOSIS — R53.1 WEAKNESS: ICD-10-CM

## 2020-03-09 DIAGNOSIS — N39.0 URINARY TRACT INFECTION WITHOUT HEMATURIA, SITE UNSPECIFIED: ICD-10-CM

## 2020-03-09 DIAGNOSIS — R63.4 WEIGHT LOSS, UNINTENTIONAL: ICD-10-CM

## 2020-03-09 DIAGNOSIS — R11.0 NAUSEA: Primary | ICD-10-CM

## 2020-03-09 DIAGNOSIS — E16.2 HYPOGLYCEMIA: ICD-10-CM

## 2020-03-09 PROCEDURE — 99999 PR PBB SHADOW E&M-EST. PATIENT-LVL IV: ICD-10-PCS | Mod: PBBFAC,,, | Performed by: FAMILY MEDICINE

## 2020-03-09 PROCEDURE — 99214 OFFICE O/P EST MOD 30 MIN: CPT | Mod: S$PBB,,, | Performed by: FAMILY MEDICINE

## 2020-03-09 PROCEDURE — 99214 PR OFFICE/OUTPT VISIT, EST, LEVL IV, 30-39 MIN: ICD-10-PCS | Mod: S$PBB,,, | Performed by: FAMILY MEDICINE

## 2020-03-09 PROCEDURE — 99214 OFFICE O/P EST MOD 30 MIN: CPT | Mod: PBBFAC,PO | Performed by: FAMILY MEDICINE

## 2020-03-09 PROCEDURE — 99999 PR PBB SHADOW E&M-EST. PATIENT-LVL IV: CPT | Mod: PBBFAC,,, | Performed by: FAMILY MEDICINE

## 2020-03-09 RX ORDER — FUROSEMIDE 20 MG/1
20 TABLET ORAL DAILY PRN
Qty: 30 TABLET | Refills: 5 | Status: SHIPPED | OUTPATIENT
Start: 2020-03-09 | End: 2020-10-22

## 2020-03-10 ENCOUNTER — TELEPHONE (OUTPATIENT)
Dept: INTERNAL MEDICINE | Facility: CLINIC | Age: 70
End: 2020-03-10

## 2020-03-10 ENCOUNTER — OFFICE VISIT (OUTPATIENT)
Dept: GASTROENTEROLOGY | Facility: CLINIC | Age: 70
End: 2020-03-10
Payer: MEDICARE

## 2020-03-10 VITALS
DIASTOLIC BLOOD PRESSURE: 68 MMHG | TEMPERATURE: 92 F | BODY MASS INDEX: 23.11 KG/M2 | WEIGHT: 135.38 LBS | SYSTOLIC BLOOD PRESSURE: 130 MMHG | OXYGEN SATURATION: 98 % | HEIGHT: 64 IN

## 2020-03-10 DIAGNOSIS — R11.0 NAUSEA: Primary | ICD-10-CM

## 2020-03-10 DIAGNOSIS — R63.4 WEIGHT LOSS, UNINTENTIONAL: ICD-10-CM

## 2020-03-10 DIAGNOSIS — R63.0 DECREASED APPETITE: ICD-10-CM

## 2020-03-10 DIAGNOSIS — K59.09 CHRONIC CONSTIPATION: ICD-10-CM

## 2020-03-10 PROCEDURE — 99999 PR PBB SHADOW E&M-EST. PATIENT-LVL V: ICD-10-PCS | Mod: PBBFAC,,, | Performed by: PHYSICIAN ASSISTANT

## 2020-03-10 PROCEDURE — 99214 OFFICE O/P EST MOD 30 MIN: CPT | Mod: S$PBB,,, | Performed by: PHYSICIAN ASSISTANT

## 2020-03-10 PROCEDURE — 99215 OFFICE O/P EST HI 40 MIN: CPT | Mod: PBBFAC | Performed by: PHYSICIAN ASSISTANT

## 2020-03-10 PROCEDURE — 99999 PR PBB SHADOW E&M-EST. PATIENT-LVL V: CPT | Mod: PBBFAC,,, | Performed by: PHYSICIAN ASSISTANT

## 2020-03-10 PROCEDURE — 99214 PR OFFICE/OUTPT VISIT, EST, LEVL IV, 30-39 MIN: ICD-10-PCS | Mod: S$PBB,,, | Performed by: PHYSICIAN ASSISTANT

## 2020-03-10 NOTE — TELEPHONE ENCOUNTER
----- Message from Yesica Holguin MD sent at 3/10/2020  8:41 AM CDT -----  [facility] I need [facility] to collect urine to recheck her UTI at the end of the week (thurs/fr if possible), orders in epic.

## 2020-03-10 NOTE — TELEPHONE ENCOUNTER
----- Message from Yesica Holguin MD sent at 3/8/2020 11:01 AM CDT -----  Please notify that her urine did grow out multiple types of bacteria, I saw they did give her fluids in ER and didn't find anything else. How is she doing currently?

## 2020-03-10 NOTE — PROGRESS NOTES
Subjective:      Patient ID: Mari Mayorga is a 70 y.o. female.    Chief Complaint: Nausea; Weight Loss; and Abdominal Pain    HPI  The patient has been referred for weight loss, nausea, vomiting and decreased appetite. She was last seen by Sobia Dickson in 2018 for the above. At the time, these were attributed to vestibular issues and resolved with Physical Therapy. Today she has the same issues but no change in her baseline dizziness. She reports that current symptoms started 2-3 weeks ago. She isn't eating much because she gets full easily and nauseated. She had been vomiting, but hasn't the last three days. She was seen in the ER four days ago and started on an antibiotic for UTI. She is having mild periumbilical pain which is better after a BM. She is unable to tell me how often she has a BM. She has chronic constipated treated with Amitiza and stool softener. Nothing else increases or decreases the pain. No hematochezia, melena or hematemesis. She has lost 30 pounds since October 2019.     Review of Systems  As per HPI.     Objective:     Physical Exam   Constitutional: She appears well-developed and well-nourished. No distress.   HENT:   Head: Normocephalic and atraumatic.   Eyes: EOM are normal.   Cardiovascular: Normal rate and regular rhythm.   Pulmonary/Chest: Effort normal and breath sounds normal. No respiratory distress. She has no wheezes.   Abdominal: Soft. Bowel sounds are normal. She exhibits no distension. There is tenderness in the periumbilical area.   Musculoskeletal: She exhibits edema (bilateral LE).   Neurological: She is alert. No cranial nerve deficit. Gait abnormal.   Skin: She is not diaphoretic.   Psychiatric: Her behavior is normal.       Assessment:     1. Nausea    2. Weight loss, unintentional    3. Decreased appetite    4. Chronic constipation        Plan:     CT scan. Stop Metformin for 48 hours after CT scan.   Take Ondansetron in the morning before breakfast for the  next 3 days.   Continue with Amitiza and stool softener. If continued problems, then start Miralax once a day.   Depending on results, may need to consider EGD.     Orders Placed This Encounter   Procedures    CT Abdomen Pelvis With Contrast       Follow up if symptoms worsen or fail to improve.    Thank you for the opportunity to participate in the care of this patient.   Coy Kowalski PA-C.

## 2020-03-10 NOTE — PATIENT INSTRUCTIONS
Stop Metformin for 48 hours after CT scan.   Take Ondansetron in the morning before breakfast for the next 3 days.   Continue with Amitiza and stool softener. If continued problems going, then start Miralax once a day.

## 2020-03-10 NOTE — LETTER
March 10, 2020      Yesica Holguin MD  [id number] [address] [facility]  [address] [place] [id number]  Phone: [phone]  Fax: [phone]          Patient: Mari Mayorga   MR Number: [id number]   YOB: 1950   Date of Visit: 3/10/2020       Dear Dr. Yesica Holguin:    Thank you for referring Mari Mayorga to me for evaluation. Attached you will find relevant portions of my assessment and plan of care.    If you have questions, please do not hesitate to call me. I look forward to following Mari Mayorga along with you.    Sincerely,    EVI Jacob  CC:  No Recipients    If you would like to receive this communication electronically, please contact [email or web address] or [phone] to request more information on [facility] access.    For providers and/or their staff who would like to refer a patient to [facility], please contact us through our one-stop-shop provider referral line, [facility], at [phone].    If you feel you have received this communication in error or would no longer like to receive these types of communications, please e-mail [email or web address]

## 2020-03-10 NOTE — PROGRESS NOTES
Subjective:      Patient ID: Mari Mayorga is a 70 y.o. female.    Chief Complaint: Follow-up      Patient here today for ER follow-up.  She reports some slight improvement over the weekend, still very nauseated and not able to eat very much at all.  Her confusion has improved somewhat since starting Cipro for her UTI.  Still very weak.  She has [facility] health currently who is skilled nursing, PT/OT coming to her house routinely.  Family member here with her today is considering nursing home/rehab placement, not sure what to do, feeling overwhelmed.    Review of Systems   Constitutional: Positive for activity change, appetite change, fatigue and unexpected weight change.   HENT: Negative for sore throat.    Respiratory: Negative for shortness of breath.    Cardiovascular: Positive for leg swelling. Negative for chest pain and palpitations.   Gastrointestinal: Positive for abdominal pain, nausea and vomiting. Negative for constipation and diarrhea.   Musculoskeletal: Positive for arthralgias.   Neurological: Positive for weakness.   Psychiatric/Behavioral: Positive for confusion and sleep disturbance.     Past Medical History:   Diagnosis Date    Breast cancer     CAD (coronary artery disease)     s/p stent 2016    Chronic constipation     Dementia     GERD (gastroesophageal reflux disease)     History of breast cancer     bilateral breast, XRT both times, no ctx    History of CVA (cerebrovascular accident)     Hyperlipidemia associated with type 2 diabetes mellitus     Hypertension associated with diabetes     Major depression, recurrent     Parkinson disease     Type II diabetes mellitus with complication           Past Surgical History:   Procedure Laterality Date    ADENOIDECTOMY      BACK SURGERY      BREAST LUMPECTOMY      CHOLECYSTECTOMY      HYSTERECTOMY      TONSILLECTOMY       Family History   Problem Relation Age of Onset    Diabetes Mother      Social History  "    Socioeconomic History    Marital status:      Spouse name: Not on file    Number of children: Not on file    Years of education: Not on file    Highest education level: Not on file   Occupational History    Not on file   Social Needs    Financial resource strain: Not on file    Food insecurity:     Worry: Not on file     Inability: Not on file    Transportation needs:     Medical: Not on file     Non-medical: Not on file   Tobacco Use    Smoking status: Former Smoker    Smokeless tobacco: Never Used   Substance and Sexual Activity    Alcohol use: No    Drug use: No    Sexual activity: Never   Lifestyle    Physical activity:     Days per week: Not on file     Minutes per session: Not on file    Stress: Not on file   Relationships    Social connections:     Talks on phone: Not on file     Gets together: Not on file     Attends Denominational service: Not on file     Active member of club or organization: Not on file     Attends meetings of clubs or organizations: Not on file     Relationship status: Not on file   Other Topics Concern    Not on file   Social History Narrative    Not on file     Review of patient's allergies indicates:   Allergen Reactions    Morphine Hives    Donepezil Nausea And Vomiting       Objective:       /78 (BP Location: Right arm)   Pulse 92   Temp 98 °F (36.7 °C) (Tympanic)   Ht 5' 4" (1.626 m)   Wt 60.3 kg (133 lb)   SpO2 98%   BMI 22.83 kg/m²   Physical Exam   Constitutional: She is oriented to person, place, and time. Vital signs are normal. She appears well-developed and well-nourished. No distress.   HENT:   Head: Normocephalic and atraumatic.   Right Ear: Hearing, tympanic membrane, external ear and ear canal normal.   Left Ear: Hearing, tympanic membrane, external ear and ear canal normal.   Nose: Nose normal.   Mouth/Throat: Uvula is midline, oropharynx is clear and moist and mucous membranes are normal. No oropharyngeal exudate.   Eyes: Pupils " are equal, round, and reactive to light. Conjunctivae and EOM are normal.   Neck: Normal range of motion. Neck supple. No tracheal deviation present. No thyromegaly present.   Cardiovascular: Normal rate, regular rhythm, normal heart sounds and intact distal pulses.   No murmur heard.  Pulmonary/Chest: Effort normal and breath sounds normal. No respiratory distress.   Abdominal: Soft. Bowel sounds are normal. There is no tenderness. There is no guarding. No hernia.   Musculoskeletal: Normal range of motion. She exhibits edema (1+ pitting bilaterally).   Lymphadenopathy:     She has no cervical adenopathy.   Neurological: She is alert and oriented to person, place, and time.   Skin: Skin is warm and dry. Capillary refill takes less than 2 seconds. She is not diaphoretic.   Psychiatric: She has a normal mood and affect. Her behavior is normal.   Vitals reviewed.    Assessment:     1. Nausea    2. Weakness    3. Weight loss, unintentional    4. Hypoglycemia    5. Urinary tract infection without hematuria, site unspecified      Plan:   Nausea  -     Ambulatory referral/consult to Gastroenterology; Future; Expected date: 03/16/2020    Weakness  -     Ambulatory referral/consult to Social Work; Future; Expected date: 03/17/2020    Weight loss, unintentional  -     Ambulatory referral/consult to Gastroenterology; Future; Expected date: 03/16/2020  -     Ambulatory referral/consult to Social Work; Future; Expected date: 03/17/2020    Hypoglycemia    Urinary tract infection without hematuria, site unspecified  -     Urinalysis; Future; Expected date: 03/10/2020  -     Urine culture; Future; Expected date: 03/10/2020    Other orders  -     furosemide (LASIX) 20 MG tablet; Take 1 tablet (20 mg total) by mouth daily as needed (swelling).  Dispense: 30 tablet; Refill: 5      Medication List with Changes/Refills   New Medications    FUROSEMIDE (LASIX) 20 MG TABLET    Take 1 tablet (20 mg total) by mouth daily as needed  (swelling).   Current Medications    AMITIZA 24 MCG CAP    TAKE 1 CAPSULE BY MOUTH  DAILY WITH A MEAL    AMLODIPINE (NORVASC) 2.5 MG TABLET    Take 1 tablet (2.5 mg total) by mouth once daily.    ASPIRIN (ECOTRIN) 81 MG EC TABLET    Take 81 mg by mouth.    BLOOD SUGAR DIAGNOSTIC STRP    Check blood sugar (4) times daily    BLOOD-GLUCOSE METER (CLEVER CHOICE BLOOD GLUC SYS) MISC        BUPROPION (WELLBUTRIN XL) 300 MG 24 HR TABLET    TAKE 1 TABLET BY MOUTH ONCE DAILY    CARBIDOPA-LEVODOPA  MG (SINEMET)  MG PER TABLET    Take 2 tablets by mouth 4 (four) times daily. Takes 2 1/2 pills by mouth four times daily    CEPHALEXIN (KEFLEX) 500 MG CAPSULE    Take 1 capsule (500 mg total) by mouth every 8 (eight) hours. for 7 days    INSULIN SYRINGES, DISPOSABLE, 1 ML SYRG    Inject into the skin.    LANCETS 28 GAUGE MISC    Inject 1 applicator into the skin.    LANCING DEVICE (LANCING DEVICE WITH LANCETS) MISC        LIRAGLUTIDE 0.6 MG/0.1 ML, 18 MG/3 ML, SUBQ PNIJ (VICTOZA 3-MADDY) 0.6 MG/0.1 ML (18 MG/3 ML) PNIJ    Inject 1.8 mg into the skin once daily.    LISINOPRIL (PRINIVIL,ZESTRIL) 40 MG TABLET    Take 1 tablet by mouth once daily.    MEMANTINE (NAMENDA) 10 MG TAB    Take 1 tablet (10 mg total) by mouth 2 (two) times daily.    METFORMIN (GLUCOPHAGE) 500 MG TABLET    Take 1 tablet (500 mg total) by mouth 2 (two) times daily with meals.    ONDANSETRON (ZOFRAN-ODT) 4 MG TBDL    Take 1 tablet (4 mg total) by mouth every 8 (eight) hours as needed (nausea).    PANTOPRAZOLE (PROTONIX) 40 MG TABLET    TAKE 1 TABLET BY MOUTH ONCE DAILY   Discontinued Medications    GLIMEPIRIDE (AMARYL) 4 MG TABLET    TAKE 1 TABLET BY MOUTH  DAILY WITH BREAKFAST

## 2020-03-16 ENCOUNTER — HOSPITAL ENCOUNTER (OUTPATIENT)
Dept: RADIOLOGY | Facility: HOSPITAL | Age: 70
Discharge: HOME | End: 2020-03-16
Attending: PHYSICIAN ASSISTANT
Payer: MEDICARE

## 2020-03-16 ENCOUNTER — TELEPHONE (OUTPATIENT)
Dept: GASTROENTEROLOGY | Facility: CLINIC | Age: 70
End: 2020-03-16

## 2020-03-16 DIAGNOSIS — R63.4 WEIGHT LOSS, UNINTENTIONAL: ICD-10-CM

## 2020-03-16 DIAGNOSIS — K59.09 CHRONIC CONSTIPATION: ICD-10-CM

## 2020-03-16 DIAGNOSIS — R63.0 DECREASED APPETITE: ICD-10-CM

## 2020-03-16 DIAGNOSIS — R11.0 NAUSEA: ICD-10-CM

## 2020-03-16 PROCEDURE — 25500020 PHARM REV CODE 255: Performed by: PHYSICIAN ASSISTANT

## 2020-03-16 PROCEDURE — 74177 CT ABD & PELVIS W/CONTRAST: CPT | Mod: TC

## 2020-03-16 RX ADMIN — IOHEXOL 75 ML: 350 INJECTION, SOLUTION INTRAVENOUS at 09:03

## 2020-03-16 RX ADMIN — IOHEXOL 30 ML: 350 INJECTION, SOLUTION INTRAVENOUS at 09:03

## 2020-03-16 NOTE — TELEPHONE ENCOUNTER
Spoke to Valerie Neil, with [facility], Ph [phone].  She is currently in the home with the pt. The pt and her daughter does not remember the last date that pt had a bowel movement, reports she is taking the Amitiza, stool softener and Miralax.   Valerie reports hypoactive bowel sounds with pt reporting some abdomen discomfort. Pt had a CT done this morning.   Any recommendations?

## 2020-03-16 NOTE — TELEPHONE ENCOUNTER
Spoke to Valerie with [facility] at [phone], with recommendations and advise. Valerie verbalized understanding and agreed to plan.

## 2020-03-17 ENCOUNTER — TELEPHONE (OUTPATIENT)
Dept: GASTROENTEROLOGY | Facility: CLINIC | Age: 70
End: 2020-03-17

## 2020-03-17 ENCOUNTER — PATIENT MESSAGE (OUTPATIENT)
Dept: GASTROENTEROLOGY | Facility: CLINIC | Age: 70
End: 2020-03-17

## 2020-03-17 DIAGNOSIS — R93.2 ABNORMAL FINDINGS ON DIAGNOSTIC IMAGING OF LIVER AND BILIARY TRACT: ICD-10-CM

## 2020-03-17 DIAGNOSIS — D49.0 LIVER NEOPLASM: ICD-10-CM

## 2020-03-18 ENCOUNTER — PATIENT MESSAGE (OUTPATIENT)
Dept: GASTROENTEROLOGY | Facility: CLINIC | Age: 70
End: 2020-03-18

## 2020-03-18 ENCOUNTER — PATIENT MESSAGE (OUTPATIENT)
Dept: INTERNAL MEDICINE | Facility: CLINIC | Age: 70
End: 2020-03-18

## 2020-03-18 NOTE — TELEPHONE ENCOUNTER
I spoke to the patient's daughter, Katherin. CT results reviewed and plan discussed. Please schedule MRI as soon as possible. I have also entered labs which can be done the same day.   Thanks,   Coy

## 2020-03-18 NOTE — TELEPHONE ENCOUNTER
Spoke to pts daughter, Katherin. Advised her that I have scheduled her MRI for tomorrow at 745 am. They agreed to plan.

## 2020-03-19 ENCOUNTER — PATIENT MESSAGE (OUTPATIENT)
Dept: GASTROENTEROLOGY | Facility: CLINIC | Age: 70
End: 2020-03-19

## 2020-03-19 ENCOUNTER — HOSPITAL ENCOUNTER (OUTPATIENT)
Dept: RADIOLOGY | Facility: HOSPITAL | Age: 70
Discharge: HOME | End: 2020-03-19
Attending: PHYSICIAN ASSISTANT
Payer: MEDICARE

## 2020-03-19 DIAGNOSIS — D49.0 LIVER NEOPLASM: ICD-10-CM

## 2020-03-19 PROCEDURE — 25500020 PHARM REV CODE 255: Performed by: PHYSICIAN ASSISTANT

## 2020-03-19 PROCEDURE — A9585 GADOBUTROL INJECTION: HCPCS | Performed by: PHYSICIAN ASSISTANT

## 2020-03-19 PROCEDURE — 74183 MRI ABD W/O CNTR FLWD CNTR: CPT | Mod: TC

## 2020-03-19 RX ORDER — GADOBUTROL 604.72 MG/ML
10 INJECTION INTRAVENOUS
Status: COMPLETED | OUTPATIENT
Start: 2020-03-19 | End: 2020-03-19

## 2020-03-19 RX ADMIN — GADOBUTROL 6 ML: 604.72 INJECTION INTRAVENOUS at 11:03

## 2020-03-20 ENCOUNTER — PATIENT MESSAGE (OUTPATIENT)
Dept: GASTROENTEROLOGY | Facility: CLINIC | Age: 70
End: 2020-03-20

## 2020-03-20 DIAGNOSIS — R93.2 ABNORMAL FINDINGS ON DIAGNOSTIC IMAGING OF LIVER AND BILIARY TRACT: Primary | ICD-10-CM

## 2020-03-20 DIAGNOSIS — R63.4 WEIGHT LOSS, UNINTENTIONAL: ICD-10-CM

## 2020-03-20 NOTE — TELEPHONE ENCOUNTER
Discussed results with Dr. Kaplan. He agrees there is no mass or thrombus. However, he recommends repeat MRI in three months.   Results and recommendations discussed with the patient's daughter, Katherin. We also discussed her BMs. Continue Amitiza bid and Miralax daily for now, however, if diarrhea continues into tomorrow, hold pm Amitiza. She will check in with me early next week.   Please contact daughter to schedule MRI.   Thanks,   Coy

## 2020-03-26 ENCOUNTER — PATIENT MESSAGE (OUTPATIENT)
Dept: INTERNAL MEDICINE | Facility: CLINIC | Age: 70
End: 2020-03-26

## 2020-03-27 ENCOUNTER — PATIENT MESSAGE (OUTPATIENT)
Dept: INTERNAL MEDICINE | Facility: CLINIC | Age: 70
End: 2020-03-27

## 2020-05-01 ENCOUNTER — TELEPHONE (OUTPATIENT)
Dept: INTERNAL MEDICINE | Facility: CLINIC | Age: 70
End: 2020-05-01

## 2020-05-01 NOTE — TELEPHONE ENCOUNTER
----- Message from Jaelyn Rosenbaum sent at 5/1/2020 11:41 AM CDT -----  Contact: Conor LOPEZ Ms Tonia   Stated she's calling to verify an order for the pt Free Style monitor,  She can be reached at [phone] or [phone] Thanks

## 2020-05-04 ENCOUNTER — TELEPHONE (OUTPATIENT)
Dept: INTERNAL MEDICINE | Facility: CLINIC | Age: 70
End: 2020-05-04

## 2020-05-04 NOTE — TELEPHONE ENCOUNTER
----- Message from Titi Soto sent at 5/4/2020 11:53 AM CDT -----  Contact: [facility]   She is calling in regards to getting the approval for the patient's diabetes medical supplies and waiting for the prior authorization, please advise #[phone] & fax#[phone]

## 2020-05-06 ENCOUNTER — TELEPHONE (OUTPATIENT)
Dept: INTERNAL MEDICINE | Facility: CLINIC | Age: 70
End: 2020-05-06

## 2020-05-06 NOTE — TELEPHONE ENCOUNTER
----- Message from Michael Bailey sent at 5/6/2020 12:35 PM CDT -----  Contact: Ajay [phone]  Would like to consult with nurse regarding pt.  Please call back at [phone].  Md April

## 2020-05-12 ENCOUNTER — TELEPHONE (OUTPATIENT)
Dept: INTERNAL MEDICINE | Facility: CLINIC | Age: 70
End: 2020-05-12

## 2020-05-12 NOTE — TELEPHONE ENCOUNTER
Tried returning Mabel's phone call and she did not answer. I was unable to leave a vm because her vm was full and could not accept anymore messages.

## 2020-05-12 NOTE — TELEPHONE ENCOUNTER
----- Message from Shanti Garcia sent at 5/12/2020 11:59 AM CDT -----  Contact: [employer]  duplicate message regarding  signing free style ace script...[phone]

## 2020-06-09 DIAGNOSIS — E11.8 TYPE II DIABETES MELLITUS WITH COMPLICATION: Primary | ICD-10-CM

## 2020-06-09 RX ORDER — METFORMIN HYDROCHLORIDE 500 MG/1
500 TABLET ORAL 2 TIMES DAILY WITH MEALS
Qty: 180 TABLET | Refills: 3 | Status: SHIPPED | OUTPATIENT
Start: 2020-06-09 | End: 2020-06-10 | Stop reason: SDUPTHER

## 2020-06-10 ENCOUNTER — PATIENT MESSAGE (OUTPATIENT)
Dept: INTERNAL MEDICINE | Facility: CLINIC | Age: 70
End: 2020-06-10

## 2020-06-10 RX ORDER — METFORMIN HYDROCHLORIDE 500 MG/1
500 TABLET ORAL 2 TIMES DAILY WITH MEALS
Qty: 180 TABLET | Refills: 0 | Status: SHIPPED | OUTPATIENT
Start: 2020-06-10 | End: 2020-07-28 | Stop reason: SDUPTHER

## 2020-06-22 ENCOUNTER — HOSPITAL ENCOUNTER (OUTPATIENT)
Dept: RADIOLOGY | Facility: HOSPITAL | Age: 70
Discharge: HOME | End: 2020-06-22
Attending: PHYSICIAN ASSISTANT
Payer: MEDICARE

## 2020-06-22 DIAGNOSIS — R93.2 ABNORMAL FINDINGS ON DIAGNOSTIC IMAGING OF LIVER AND BILIARY TRACT: ICD-10-CM

## 2020-06-22 DIAGNOSIS — R63.4 WEIGHT LOSS, UNINTENTIONAL: ICD-10-CM

## 2020-06-22 PROCEDURE — A9585 GADOBUTROL INJECTION: HCPCS | Performed by: PHYSICIAN ASSISTANT

## 2020-06-22 PROCEDURE — 74183 MRI ABD W/O CNTR FLWD CNTR: CPT | Mod: TC

## 2020-06-22 PROCEDURE — 25500020 PHARM REV CODE 255: Performed by: PHYSICIAN ASSISTANT

## 2020-06-22 RX ORDER — GADOBUTROL 604.72 MG/ML
10 INJECTION INTRAVENOUS
Status: COMPLETED | OUTPATIENT
Start: 2020-06-22 | End: 2020-06-22

## 2020-06-22 RX ADMIN — GADOBUTROL 6 ML: 604.72 INJECTION INTRAVENOUS at 01:06

## 2020-06-24 LAB
LEFT EYE DM RETINOPATHY: POSITIVE
RIGHT EYE DM RETINOPATHY: POSITIVE

## 2020-07-14 ENCOUNTER — PATIENT OUTREACH (OUTPATIENT)
Dept: ADMINISTRATIVE | Facility: HOSPITAL | Age: 70
End: 2020-07-14

## 2020-07-14 NOTE — PROGRESS NOTES
Chart audit completed.   Updated.  Care Everywhere-No records abstracted/entered.   Lab [facility]-No records entered.  Links-Immunizations-abstracted/updated.  Annual Wellness visit-LM to schedule

## 2020-07-22 ENCOUNTER — LAB VISIT (OUTPATIENT)
Dept: LAB | Facility: HOSPITAL | Age: 70
End: 2020-07-22
Attending: INTERNAL MEDICINE
Payer: MEDICARE

## 2020-07-22 DIAGNOSIS — E11.8 TYPE II DIABETES MELLITUS WITH COMPLICATION: ICD-10-CM

## 2020-07-22 PROCEDURE — 80053 COMPREHEN METABOLIC PANEL: CPT

## 2020-07-22 PROCEDURE — 80061 LIPID PANEL: CPT

## 2020-07-22 PROCEDURE — 84443 ASSAY THYROID STIM HORMONE: CPT

## 2020-07-22 PROCEDURE — 36415 COLL VENOUS BLD VENIPUNCTURE: CPT | Mod: PO

## 2020-07-22 PROCEDURE — 85025 COMPLETE CBC W/AUTO DIFF WBC: CPT

## 2020-07-22 PROCEDURE — 83036 HEMOGLOBIN GLYCOSYLATED A1C: CPT

## 2020-07-23 LAB
ALBUMIN SERPL BCP-MCNC: 3.7 G/DL (ref 3.5–5.2)
ALP SERPL-CCNC: 63 U/L (ref 55–135)
ALT SERPL W/O P-5'-P-CCNC: 15 U/L (ref 10–44)
ANION GAP SERPL CALC-SCNC: 8 MMOL/L (ref 8–16)
AST SERPL-CCNC: 18 U/L (ref 10–40)
BASOPHILS # BLD AUTO: 0.05 K/UL (ref 0–0.2)
BASOPHILS NFR BLD: 0.8 % (ref 0–1.9)
BILIRUB SERPL-MCNC: 0.5 MG/DL (ref 0.1–1)
BUN SERPL-MCNC: 26 MG/DL (ref 8–23)
CALCIUM SERPL-MCNC: 9.5 MG/DL (ref 8.7–10.5)
CHLORIDE SERPL-SCNC: 103 MMOL/L (ref 95–110)
CHOLEST SERPL-MCNC: 208 MG/DL (ref 120–199)
CHOLEST/HDLC SERPL: 3.2 {RATIO} (ref 2–5)
CO2 SERPL-SCNC: 30 MMOL/L (ref 23–29)
CREAT SERPL-MCNC: 0.8 MG/DL (ref 0.5–1.4)
DIFFERENTIAL METHOD: ABNORMAL
EOSINOPHIL # BLD AUTO: 0.1 K/UL (ref 0–0.5)
EOSINOPHIL NFR BLD: 2.3 % (ref 0–8)
ERYTHROCYTE [DISTWIDTH] IN BLOOD BY AUTOMATED COUNT: 13.3 % (ref 11.5–14.5)
EST. GFR  (AFRICAN AMERICAN): >60 ML/MIN/1.73 M^2
EST. GFR  (NON AFRICAN AMERICAN): >60 ML/MIN/1.73 M^2
ESTIMATED AVG GLUCOSE: 126 MG/DL (ref 68–131)
GLUCOSE SERPL-MCNC: 134 MG/DL (ref 70–110)
HBA1C MFR BLD HPLC: 6 % (ref 4–5.6)
HCT VFR BLD AUTO: 44.4 % (ref 37–48.5)
HDLC SERPL-MCNC: 66 MG/DL (ref 40–75)
HDLC SERPL: 31.7 % (ref 20–50)
HGB BLD-MCNC: 13.3 G/DL (ref 12–16)
IMM GRANULOCYTES # BLD AUTO: 0.01 K/UL (ref 0–0.04)
IMM GRANULOCYTES NFR BLD AUTO: 0.2 % (ref 0–0.5)
LDLC SERPL CALC-MCNC: 120.6 MG/DL (ref 63–159)
LYMPHOCYTES # BLD AUTO: 1.4 K/UL (ref 1–4.8)
LYMPHOCYTES NFR BLD: 22.6 % (ref 18–48)
MCH RBC QN AUTO: 29.8 PG (ref 27–31)
MCHC RBC AUTO-ENTMCNC: 30 G/DL (ref 32–36)
MCV RBC AUTO: 99 FL (ref 82–98)
MONOCYTES # BLD AUTO: 0.4 K/UL (ref 0.3–1)
MONOCYTES NFR BLD: 6.6 % (ref 4–15)
NEUTROPHILS # BLD AUTO: 4.2 K/UL (ref 1.8–7.7)
NEUTROPHILS NFR BLD: 67.5 % (ref 38–73)
NONHDLC SERPL-MCNC: 142 MG/DL
NRBC BLD-RTO: 0 /100 WBC
PLATELET # BLD AUTO: 265 K/UL (ref 150–350)
PMV BLD AUTO: 10.7 FL (ref 9.2–12.9)
POTASSIUM SERPL-SCNC: 4.6 MMOL/L (ref 3.5–5.1)
PROT SERPL-MCNC: 6.7 G/DL (ref 6–8.4)
RBC # BLD AUTO: 4.47 M/UL (ref 4–5.4)
SODIUM SERPL-SCNC: 141 MMOL/L (ref 136–145)
TRIGL SERPL-MCNC: 107 MG/DL (ref 30–150)
TSH SERPL DL<=0.005 MIU/L-ACNC: 3.54 UIU/ML (ref 0.4–4)
WBC # BLD AUTO: 6.2 K/UL (ref 3.9–12.7)

## 2020-07-28 ENCOUNTER — OFFICE VISIT (OUTPATIENT)
Dept: INTERNAL MEDICINE | Facility: CLINIC | Age: 70
End: 2020-07-28
Payer: MEDICARE

## 2020-07-28 VITALS
SYSTOLIC BLOOD PRESSURE: 106 MMHG | OXYGEN SATURATION: 96 % | WEIGHT: 143.75 LBS | BODY MASS INDEX: 24.67 KG/M2 | DIASTOLIC BLOOD PRESSURE: 68 MMHG | TEMPERATURE: 99 F | HEART RATE: 84 BPM

## 2020-07-28 DIAGNOSIS — I25.10 CORONARY ARTERY DISEASE, ANGINA PRESENCE UNSPECIFIED, UNSPECIFIED VESSEL OR LESION TYPE, UNSPECIFIED WHETHER NATIVE OR TRANSPLANTED HEART: ICD-10-CM

## 2020-07-28 DIAGNOSIS — E11.8 TYPE II DIABETES MELLITUS WITH COMPLICATION: ICD-10-CM

## 2020-07-28 DIAGNOSIS — Z86.73 HISTORY OF CVA (CEREBROVASCULAR ACCIDENT): ICD-10-CM

## 2020-07-28 DIAGNOSIS — E11.59 HYPERTENSION ASSOCIATED WITH DIABETES: ICD-10-CM

## 2020-07-28 DIAGNOSIS — I15.2 HYPERTENSION ASSOCIATED WITH DIABETES: ICD-10-CM

## 2020-07-28 DIAGNOSIS — Z12.31 BREAST CANCER SCREENING BY MAMMOGRAM: ICD-10-CM

## 2020-07-28 DIAGNOSIS — Z85.3 HISTORY OF BREAST CANCER: ICD-10-CM

## 2020-07-28 DIAGNOSIS — E78.5 HYPERLIPIDEMIA ASSOCIATED WITH TYPE 2 DIABETES MELLITUS: Primary | ICD-10-CM

## 2020-07-28 DIAGNOSIS — G20.A1 PARKINSON DISEASE: ICD-10-CM

## 2020-07-28 DIAGNOSIS — K59.09 CHRONIC CONSTIPATION: ICD-10-CM

## 2020-07-28 DIAGNOSIS — G30.1 LATE ONSET ALZHEIMER'S DISEASE WITHOUT BEHAVIORAL DISTURBANCE: ICD-10-CM

## 2020-07-28 DIAGNOSIS — F02.80 LATE ONSET ALZHEIMER'S DISEASE WITHOUT BEHAVIORAL DISTURBANCE: ICD-10-CM

## 2020-07-28 DIAGNOSIS — E11.69 HYPERLIPIDEMIA ASSOCIATED WITH TYPE 2 DIABETES MELLITUS: Primary | ICD-10-CM

## 2020-07-28 DIAGNOSIS — F33.1 MODERATE EPISODE OF RECURRENT MAJOR DEPRESSIVE DISORDER: ICD-10-CM

## 2020-07-28 DIAGNOSIS — M81.0 POSTMENOPAUSAL BONE LOSS: ICD-10-CM

## 2020-07-28 PROCEDURE — 99214 PR OFFICE/OUTPT VISIT, EST, LEVL IV, 30-39 MIN: ICD-10-PCS | Mod: S$PBB,,, | Performed by: INTERNAL MEDICINE

## 2020-07-28 PROCEDURE — 99999 PR PBB SHADOW E&M-EST. PATIENT-LVL III: CPT | Mod: PBBFAC,,, | Performed by: INTERNAL MEDICINE

## 2020-07-28 PROCEDURE — 99214 OFFICE O/P EST MOD 30 MIN: CPT | Mod: S$PBB,,, | Performed by: INTERNAL MEDICINE

## 2020-07-28 PROCEDURE — 99999 PR PBB SHADOW E&M-EST. PATIENT-LVL III: ICD-10-PCS | Mod: PBBFAC,,, | Performed by: INTERNAL MEDICINE

## 2020-07-28 PROCEDURE — 99213 OFFICE O/P EST LOW 20 MIN: CPT | Mod: PBBFAC,PO | Performed by: INTERNAL MEDICINE

## 2020-07-28 RX ORDER — LISINOPRIL 40 MG/1
40 TABLET ORAL DAILY
Qty: 90 TABLET | Refills: 3 | Status: SHIPPED | OUTPATIENT
Start: 2020-07-28 | End: 2021-08-02 | Stop reason: SDUPTHER

## 2020-07-28 RX ORDER — GLIMEPIRIDE 4 MG/1
4 TABLET ORAL DAILY
Qty: 90 TABLET | Refills: 3 | Status: SHIPPED | OUTPATIENT
Start: 2020-07-28 | End: 2021-08-25

## 2020-07-28 RX ORDER — LUBIPROSTONE 24 UG/1
24 CAPSULE, GELATIN COATED ORAL
Qty: 90 CAPSULE | Refills: 3 | Status: SHIPPED | OUTPATIENT
Start: 2020-07-28 | End: 2021-08-25

## 2020-07-28 RX ORDER — PANTOPRAZOLE SODIUM 40 MG/1
40 TABLET, DELAYED RELEASE ORAL DAILY
Qty: 90 TABLET | Refills: 3 | Status: SHIPPED | OUTPATIENT
Start: 2020-07-28 | End: 2021-08-25

## 2020-07-28 RX ORDER — METFORMIN HYDROCHLORIDE 500 MG/1
500 TABLET ORAL 2 TIMES DAILY WITH MEALS
Qty: 180 TABLET | Refills: 3 | Status: SHIPPED | OUTPATIENT
Start: 2020-07-28 | End: 2021-07-31

## 2020-07-28 RX ORDER — BUPROPION HYDROCHLORIDE 300 MG/1
300 TABLET ORAL DAILY
Qty: 90 TABLET | Refills: 3 | Status: SHIPPED | OUTPATIENT
Start: 2020-07-28 | End: 2021-02-03

## 2020-07-28 RX ORDER — AMLODIPINE BESYLATE 2.5 MG/1
2.5 TABLET ORAL DAILY
Qty: 90 TABLET | Refills: 3 | Status: SHIPPED | OUTPATIENT
Start: 2020-07-28 | End: 2021-08-02 | Stop reason: SDUPTHER

## 2020-07-28 NOTE — PROGRESS NOTES
HPI:  Patient is a 70-year-old female comes today accompanied by her daughter for follow-up for diabetes, hypertension, lipids, coronary artery disease, and for her annual physical.  Patient has been doing fairly well.  She has been worked up recently for constipation and abdominal complaints.  At this time she has no abdominal complaints.  I have reviewed all the imaging studies.  I do not think there is any need to repeat any additional studies.  Her blood sugars been doing excellent.  Most time is less than 130 in the morning.  She has had no hypoglycemic events.  Her blood Parkinson's disease also has been doing well.  She has had no angina.    Current MEDS: medcard review, verified and update  Allergies: Per the electronic medical record    Past Medical History:   Diagnosis Date    CAD (coronary artery disease)     s/p stent 2016    Chronic constipation     Dementia     GERD (gastroesophageal reflux disease)     History of breast cancer     bilateral breast, XRT both times, no ctx    History of CVA (cerebrovascular accident)     Hyperlipidemia associated with type 2 diabetes mellitus     Hypertension associated with diabetes     Major depression, recurrent     Parkinson disease     Type II diabetes mellitus with complication        Past Surgical History:   Procedure Laterality Date    ADENOIDECTOMY      BACK SURGERY      BREAST LUMPECTOMY      CHOLECYSTECTOMY      HYSTERECTOMY      TONSILLECTOMY         SHx: per the electronic medical record    FHx: recorded in the electronic medical record    ROS:    denies any chest pains or shortness of breath. Denies any nausea, vomiting or diarrhea. Denies any fever, chills or sweats. Denies any change in weight, voice, stool, skin or hair. Denies any dysuria, dyspepsia or dysphagia. Denies any change in vision, hearing or headaches. Denies any swollen lymph nodes or loss of memory.    PE:  /68 (BP Location: Right arm, Patient Position: Sitting, BP  Method: Medium (Manual))   Pulse 84   Temp 99.1 °F (37.3 °C) (Temporal)   Wt 65.2 kg (143 lb 11.8 oz)   SpO2 96%   BMI 24.67 kg/m²   Gen: Well-developed, well-nourished, female, in no acute distress, oriented x3  HEENT: neck is supple, no adenopathy, carotids 2+ equal without bruits, thyroid exam normal size without nodules.  CHEST: clear to auscultation and percussion  CVS: regular rate and rhythm without significant murmur, gallop, or rubs  ABD: soft, benign, no rebound no guarding, no distention. Bowel sounds are normal.     Nontender,  no palpable masses, no organomegaly and no audible bruits.  BREAST: no masses.  No nipple inversion, retraction, or deviation.  She has had bilateral lumpectomies done  EXT: no clubbing, cyanosis, or edema  LYMPH: no cervical, inguinal, or axillary adenopathy  FEET: no loss of sensation.  No ulcers or pressure sores.  NEURO: gait normal.  Cranial nerves II- XII intact. No nystagmus.  Speech normal.   Gross motor and sensory unremarkable.  PELVIC: deferred    Lab Results   Component Value Date    WBC 6.20 07/22/2020    HGB 13.3 07/22/2020    HCT 44.4 07/22/2020     07/22/2020    CHOL 208 (H) 07/22/2020    TRIG 107 07/22/2020    HDL 66 07/22/2020    ALT 15 07/22/2020    AST 18 07/22/2020     07/22/2020    K 4.6 07/22/2020     07/22/2020    CREATININE 0.8 07/22/2020    BUN 26 (H) 07/22/2020    CO2 30 (H) 07/22/2020    TSH 3.538 07/22/2020    INR 1.1 03/06/2020    HGBA1C 6.0 (H) 07/22/2020       Impression:  Multiple medical problems below, stable  Patient Active Problem List   Diagnosis    Type II diabetes mellitus with complication    Hypertension associated with diabetes    Hyperlipidemia associated with type 2 diabetes mellitus    Parkinson disease    CAD (coronary artery disease)    GERD (gastroesophageal reflux disease)    Chronic constipation    History of breast cancer    History of CVA (cerebrovascular accident)    Major depression, recurrent     Dementia       Plan:   Orders Placed This Encounter    DXA Bone Density Spine And Hip    Hemoglobin A1C    Lipid Panel    Comprehensive metabolic panel    TSH    CBC auto differential    AMITIZA 24 mcg Cap    buPROPion (WELLBUTRIN XL) 300 MG 24 hr tablet    glimepiride (AMARYL) 4 MG tablet    lisinopriL (PRINIVIL,ZESTRIL) 40 MG tablet    metFORMIN (GLUCOPHAGE) 500 MG tablet    pantoprazole (PROTONIX) 40 MG tablet    amLODIPine (NORVASC) 2.5 MG tablet     Medications remain the same.  She will be seen again in 6 months with above lab work in DEXA scan    This note is generated with speech recognition software and is subject to transcription error and sound alike phrases that may be missed by proofreading.

## 2020-08-11 LAB
LEFT EYE DM RETINOPATHY: NEGATIVE
RIGHT EYE DM RETINOPATHY: NEGATIVE

## 2020-10-22 ENCOUNTER — HOSPITAL ENCOUNTER (EMERGENCY)
Facility: HOSPITAL | Age: 70
Discharge: HOME | End: 2020-10-22
Attending: EMERGENCY MEDICINE
Payer: MEDICARE

## 2020-10-22 ENCOUNTER — TELEPHONE (OUTPATIENT)
Dept: INTERNAL MEDICINE | Facility: CLINIC | Age: 70
End: 2020-10-22

## 2020-10-22 VITALS
RESPIRATION RATE: 12 BRPM | HEIGHT: 64 IN | TEMPERATURE: 99 F | SYSTOLIC BLOOD PRESSURE: 154 MMHG | DIASTOLIC BLOOD PRESSURE: 71 MMHG | BODY MASS INDEX: 24.67 KG/M2 | OXYGEN SATURATION: 100 % | HEART RATE: 79 BPM

## 2020-10-22 DIAGNOSIS — S00.83XA CONTUSION OF FOREHEAD, INITIAL ENCOUNTER: ICD-10-CM

## 2020-10-22 DIAGNOSIS — S09.90XA CLOSED HEAD INJURY, INITIAL ENCOUNTER: Primary | ICD-10-CM

## 2020-10-22 DIAGNOSIS — R53.1 WEAKNESS: ICD-10-CM

## 2020-10-22 LAB
ALBUMIN SERPL BCP-MCNC: 3.7 G/DL (ref 3.5–5.2)
ALP SERPL-CCNC: 54 U/L (ref 55–135)
ALT SERPL W/O P-5'-P-CCNC: 5 U/L (ref 10–44)
ANION GAP SERPL CALC-SCNC: 10 MMOL/L (ref 8–16)
AST SERPL-CCNC: 15 U/L (ref 10–40)
BASOPHILS # BLD AUTO: 0.04 K/UL (ref 0–0.2)
BASOPHILS NFR BLD: 0.6 % (ref 0–1.9)
BILIRUB SERPL-MCNC: 0.6 MG/DL (ref 0.1–1)
BILIRUB UR QL STRIP: NEGATIVE
BUN SERPL-MCNC: 18 MG/DL (ref 8–23)
CALCIUM SERPL-MCNC: 9.2 MG/DL (ref 8.7–10.5)
CHLORIDE SERPL-SCNC: 105 MMOL/L (ref 95–110)
CLARITY UR: CLEAR
CO2 SERPL-SCNC: 25 MMOL/L (ref 23–29)
COLOR UR: YELLOW
CREAT SERPL-MCNC: 0.8 MG/DL (ref 0.5–1.4)
DIFFERENTIAL METHOD: NORMAL
EOSINOPHIL # BLD AUTO: 0.2 K/UL (ref 0–0.5)
EOSINOPHIL NFR BLD: 2.4 % (ref 0–8)
ERYTHROCYTE [DISTWIDTH] IN BLOOD BY AUTOMATED COUNT: 12.6 % (ref 11.5–14.5)
EST. GFR  (AFRICAN AMERICAN): >60 ML/MIN/1.73 M^2
EST. GFR  (NON AFRICAN AMERICAN): >60 ML/MIN/1.73 M^2
GLUCOSE SERPL-MCNC: 102 MG/DL (ref 70–110)
GLUCOSE UR QL STRIP: NEGATIVE
HCT VFR BLD AUTO: 41.2 % (ref 37–48.5)
HGB BLD-MCNC: 13.4 G/DL (ref 12–16)
HGB UR QL STRIP: NEGATIVE
IMM GRANULOCYTES # BLD AUTO: 0.01 K/UL (ref 0–0.04)
IMM GRANULOCYTES NFR BLD AUTO: 0.2 % (ref 0–0.5)
KETONES UR QL STRIP: NEGATIVE
LEUKOCYTE ESTERASE UR QL STRIP: NEGATIVE
LYMPHOCYTES # BLD AUTO: 1.7 K/UL (ref 1–4.8)
LYMPHOCYTES NFR BLD: 26.6 % (ref 18–48)
MCH RBC QN AUTO: 30.7 PG (ref 27–31)
MCHC RBC AUTO-ENTMCNC: 32.5 G/DL (ref 32–36)
MCV RBC AUTO: 95 FL (ref 82–98)
MONOCYTES # BLD AUTO: 0.5 K/UL (ref 0.3–1)
MONOCYTES NFR BLD: 8.4 % (ref 4–15)
NEUTROPHILS # BLD AUTO: 3.8 K/UL (ref 1.8–7.7)
NEUTROPHILS NFR BLD: 61.8 % (ref 38–73)
NITRITE UR QL STRIP: NEGATIVE
NRBC BLD-RTO: 0 /100 WBC
PH UR STRIP: 7 [PH] (ref 5–8)
PLATELET # BLD AUTO: 209 K/UL (ref 150–350)
PMV BLD AUTO: 10.5 FL (ref 9.2–12.9)
POTASSIUM SERPL-SCNC: 4.2 MMOL/L (ref 3.5–5.1)
PROT SERPL-MCNC: 6.6 G/DL (ref 6–8.4)
PROT UR QL STRIP: NEGATIVE
RBC # BLD AUTO: 4.36 M/UL (ref 4–5.4)
SODIUM SERPL-SCNC: 140 MMOL/L (ref 136–145)
SP GR UR STRIP: 1.01 (ref 1–1.03)
TROPONIN I SERPL DL<=0.01 NG/ML-MCNC: <0.006 NG/ML (ref 0–0.03)
URN SPEC COLLECT METH UR: NORMAL
UROBILINOGEN UR STRIP-ACNC: NEGATIVE EU/DL
WBC # BLD AUTO: 6.21 K/UL (ref 3.9–12.7)

## 2020-10-22 PROCEDURE — 25000003 PHARM REV CODE 250: Performed by: EMERGENCY MEDICINE

## 2020-10-22 PROCEDURE — 93005 ELECTROCARDIOGRAM TRACING: CPT

## 2020-10-22 PROCEDURE — 93010 EKG 12-LEAD: ICD-10-PCS | Mod: ,,, | Performed by: INTERNAL MEDICINE

## 2020-10-22 PROCEDURE — 99285 EMERGENCY DEPT VISIT HI MDM: CPT | Mod: 25

## 2020-10-22 PROCEDURE — 80053 COMPREHEN METABOLIC PANEL: CPT

## 2020-10-22 PROCEDURE — 85025 COMPLETE CBC W/AUTO DIFF WBC: CPT

## 2020-10-22 PROCEDURE — 84484 ASSAY OF TROPONIN QUANT: CPT

## 2020-10-22 PROCEDURE — 81003 URINALYSIS AUTO W/O SCOPE: CPT

## 2020-10-22 PROCEDURE — 36415 COLL VENOUS BLD VENIPUNCTURE: CPT

## 2020-10-22 PROCEDURE — 93010 ELECTROCARDIOGRAM REPORT: CPT | Mod: ,,, | Performed by: INTERNAL MEDICINE

## 2020-10-22 RX ORDER — ACETAMINOPHEN 500 MG
1000 TABLET ORAL
Status: COMPLETED | OUTPATIENT
Start: 2020-10-22 | End: 2020-10-22

## 2020-10-22 RX ORDER — DOCUSATE SODIUM 250 MG
250 CAPSULE ORAL DAILY
COMMUNITY

## 2020-10-22 RX ADMIN — ACETAMINOPHEN 1000 MG: 500 TABLET ORAL at 07:10

## 2020-10-22 NOTE — TELEPHONE ENCOUNTER
----- Message from Nasir Law sent at 10/22/2020  8:21 AM CDT -----  Contact: daughter-mickey  Type:  Same Day Appointment Request    Caller is requesting a same day appointment.  Caller declined first available appointment listed below.    Name of Caller:mickey  When is the first available appointment?n/a  Symptoms:knot from fall on head  Best Call Back Number:[phone]  Additional Information: non

## 2020-10-22 NOTE — TELEPHONE ENCOUNTER
----- Message from Savanna Crespo sent at 10/22/2020  8:13 AM CDT -----  Regarding: pt fell  Contact: daughter,  Pt fell and has a large knot on head. Please advise. Please call daughter Katherin Archer at [phone]

## 2020-10-22 NOTE — TELEPHONE ENCOUNTER
Spoke with daughter Katherin advices to bring pt to Urgent Care or go to ER; Ms. Pandey agreed and plan to bring pt to [facility] after hour off [place] today

## 2020-10-23 ENCOUNTER — TELEPHONE (OUTPATIENT)
Dept: INTERNAL MEDICINE | Facility: CLINIC | Age: 70
End: 2020-10-23

## 2020-10-23 NOTE — TELEPHONE ENCOUNTER
----- Message from Desiree Wagner sent at 10/22/2020  9:40 AM CDT -----  Type:  Patient Returning Call    Who Called:Everett  Who Left Message for Patient:IVANSEBASTIAN  Does the patient know what this is regarding?:  Would the patient rather a call back or a response via [employer]? call  Best Call Back Number:[phone]    Additional Information:

## 2020-10-23 NOTE — ED PROVIDER NOTES
SCRIBE #1 NOTE: I, Andriadebbie Peng, am scribing for, and in the presence of, Sandie Goddard MD. I have scribed the HPI, ROS, and PEx.     SCRIBE #2 NOTE: I, Cathryn Campoverde, am scribing for, and in the presence of,  Radu Travis MD. I have scribed the remaining portions of the note not scribed by Scribe #1.      History     Chief Complaint   Patient presents with    Fall     States got weak and fell this morning. Denies LOC.  Hematoma noted to right brow.  Denies any other complaints.     Review of patient's allergies indicates:   Allergen Reactions    Morphine Hives    Donepezil Nausea And Vomiting         History of Present Illness     HPI    10/22/2020, 7:39 PM  History obtained from the patient      History of Present Illness: Mari Mayorga is a 70 y.o. female patient with a h/o CAD, dementia, GERD, HLD, HTN, parkinson's disease, who presents to the Emergency Department for evaluation of a fall which onset this morning. Pt states that she suddenly became weak and fell with hitting her head on the back of the dresser this morning. Symptoms are constant and moderate in severity. No mitigating or exacerbating factors reported. Associated sxs include HA. Patient denies any LOC, dizziness, light-headedness, numbness, fever, chills, n/v, SOB, CP, and all other sxs at this time. No prior Tx reported. No further complaints or concerns at this time.       Arrival mode: EMS    PCP: Rob Sauceda MD      Past Medical History:  Past Medical History:   Diagnosis Date    CAD (coronary artery disease)     s/p stent 2016    Chronic constipation     Dementia     GERD (gastroesophageal reflux disease)     History of breast cancer     bilateral breast, XRT both times, no ctx    History of CVA (cerebrovascular accident)     Hyperlipidemia associated with type 2 diabetes mellitus     Hypertension associated with diabetes     Major depression, recurrent     Parkinson disease     Type II diabetes mellitus  with complication        Past Surgical History:  Past Surgical History:   Procedure Laterality Date    ADENOIDECTOMY      BACK SURGERY      BREAST LUMPECTOMY      CHOLECYSTECTOMY      HYSTERECTOMY      TONSILLECTOMY           Family History:  Family History   Problem Relation Age of Onset    Diabetes Mother        Social History:   Social History     Tobacco Use    Smoking status: Former Smoker    Smokeless tobacco: Never Used   Substance and Sexual Activity    Alcohol use: No     Frequency: Never     Binge frequency: Never    Drug use: No    Sexual activity: Never        Review of Systems     Review of Systems   Constitutional: Negative for chills and fever.   HENT: Negative for congestion and sore throat.    Respiratory: Negative for shortness of breath.    Cardiovascular: Negative for chest pain.   Gastrointestinal: Negative for diarrhea, nausea and vomiting.   Genitourinary: Negative for dysuria.   Musculoskeletal: Negative for back pain.   Skin: Negative for rash.   Neurological: Positive for headaches. Negative for dizziness, weakness, light-headedness and numbness.        (-) LOC   Hematological: Does not bruise/bleed easily.   All other systems reviewed and are negative.       Physical Exam     Initial Vitals [10/22/20 1902]   BP Pulse Resp Temp SpO2   (!) 145/66 81 18 98.3 °F (36.8 °C) 99 %      MAP       --          Physical Exam  Nursing Notes and Vital Signs Reviewed.  Constitutional: Patient is in no acute distress. Awake and alert. Appropriate for age.   Head: Large contusion to frontal aspect of R forhead. Midface is stable. No Raccoon's eyes. No Joshi's sign.   Eyes: PERRL. EOM normal. Conjunctivae normal.   Ears: No hemotympanum.   Nose: No nasal deformity. No septal hematoma.   Mouth/Throat: Airway intact. No malocclusion. No dental trauma.   Neck: Trachea midline. No cervical bony tenderness, deformities, or step-offs.   Cardiovascular: Regular rate and rhythm. Heart sounds normal.  "Peripheral pulses are 2+ bilaterally in all extremities.   Pulmonary/Chest: Breath sounds are normal bilaterally. No decreased breath sounds. No respiratory distress. Chest wall is non-tender. No crepitus. No asymmetric rise. No flail segment.   Abdominal: Soft and non-distended. No tenderness. No external evidence of abd trauma based on inspection.   Back: No midline bony tenderness, deformities, or step-offs of the T-spine or L-spine. No abrasions or ecchymosis.   Musculoskeletal: Pelvis is non-tender and stable to compression. No obvious deformities. FROM of all extremities.   Skin: Normal color. No abrasions. No lacerations.   Neurological: Alert and oriented x3. GCS 15. Strength is normal, equal, 5/5 in bilateral upper and lower extremities. No sensory deficits to light touch. Non-focal neurological examination.   Psychiatric: Normal affect.     ED Course   Procedures  ED Vital Signs:  Vitals:    10/22/20 1902 10/22/20 2000 10/22/20 2012 10/22/20 2035   BP: (!) 145/66  (!) 141/66 (!) 177/72   Pulse: 81 75 91 80   Resp: 18   12   Temp: 98.3 °F (36.8 °C)      TempSrc: Oral      SpO2: 99%   100%   Height: 5' 4" (1.626 m)       10/22/20 2108 10/22/20 2109 10/22/20 2110 10/22/20 2112   BP: (!) 169/76 (!) 169/76 (!) 165/73 (!) 141/66   Pulse: 81 83 89 86   Resp:       Temp:       TempSrc:       SpO2:  100%  100%   Height:        10/22/20 2132 10/22/20 2212 10/22/20 2213 10/22/20 2224   BP: (!) 179/76  (!) 154/71    Pulse: 79      Resp:       Temp:    98.5 °F (36.9 °C)   TempSrc:       SpO2: 100% 100%     Height:           Abnormal Lab Results:  Labs Reviewed   COMPREHENSIVE METABOLIC PANEL - Abnormal; Notable for the following components:       Result Value    Alkaline Phosphatase 54 (*)     ALT 5 (*)     All other components within normal limits   CBC W/ AUTO DIFFERENTIAL   TROPONIN I   URINALYSIS, REFLEX TO URINE CULTURE    Narrative:     Specimen Source->Urine           Results for orders placed or performed " during the hospital encounter of 10/22/20   CBC auto differential   Result Value Ref Range    WBC 6.21 3.90 - 12.70 K/uL    RBC 4.36 4.00 - 5.40 M/uL    Hemoglobin 13.4 12.0 - 16.0 g/dL    Hematocrit 41.2 37.0 - 48.5 %    MCV 95 82 - 98 fL    MCH 30.7 27.0 - 31.0 pg    MCHC 32.5 32.0 - 36.0 g/dL    RDW 12.6 11.5 - 14.5 %    Platelets 209 150 - 350 K/uL    MPV 10.5 9.2 - 12.9 fL    Immature Granulocytes 0.2 0.0 - 0.5 %    Gran # (ANC) 3.8 1.8 - 7.7 K/uL    Immature Grans (Abs) 0.01 0.00 - 0.04 K/uL    Lymph # 1.7 1.0 - 4.8 K/uL    Mono # 0.5 0.3 - 1.0 K/uL    Eos # 0.2 0.0 - 0.5 K/uL    Baso # 0.04 0.00 - 0.20 K/uL    nRBC 0 0 /100 WBC    Gran % 61.8 38.0 - 73.0 %    Lymph % 26.6 18.0 - 48.0 %    Mono % 8.4 4.0 - 15.0 %    Eosinophil % 2.4 0.0 - 8.0 %    Basophil % 0.6 0.0 - 1.9 %    Differential Method Automated    Comprehensive metabolic panel   Result Value Ref Range    Sodium 140 136 - 145 mmol/L    Potassium 4.2 3.5 - 5.1 mmol/L    Chloride 105 95 - 110 mmol/L    CO2 25 23 - 29 mmol/L    Glucose 102 70 - 110 mg/dL    BUN 18 8 - 23 mg/dL    Creatinine 0.8 0.5 - 1.4 mg/dL    Calcium 9.2 8.7 - 10.5 mg/dL    Total Protein 6.6 6.0 - 8.4 g/dL    Albumin 3.7 3.5 - 5.2 g/dL    Total Bilirubin 0.6 0.1 - 1.0 mg/dL    Alkaline Phosphatase 54 (L) 55 - 135 U/L    AST 15 10 - 40 U/L    ALT 5 (L) 10 - 44 U/L    Anion Gap 10 8 - 16 mmol/L    eGFR if African American >60 >60 mL/min/1.73 m^2    eGFR if non African American >60 >60 mL/min/1.73 m^2   Troponin I   Result Value Ref Range    Troponin I <0.006 0.000 - 0.026 ng/mL   Urinalysis, Reflex to Urine Culture Urine, Clean Catch    Specimen: Urine   Result Value Ref Range    Specimen UA Urine, Clean Catch     Color, UA Yellow Yellow, Straw, Darleen    Appearance, UA Clear Clear    pH, UA 7.0 5.0 - 8.0    Specific Gravity, UA 1.010 1.005 - 1.030    Protein, UA Negative Negative    Glucose, UA Negative Negative    Ketones, UA Negative Negative    Bilirubin (UA) Negative Negative     Occult Blood UA Negative Negative    Nitrite, UA Negative Negative    Urobilinogen, UA Negative <2.0 EU/dL    Leukocytes, UA Negative Negative         Imaging Results          CT Head Without Contrast (Final result)  Result time 10/22/20 20:35:19    Final result by Luis Alfredo Gray MD (10/22/20 20:35:19)                 Impression:      No intracranial hemorrhage.    Moderate chronic microvascular ischemic changes.    All CT scans at this facility are performed  using dose modulation techniques as appropriate to performed exam including the following:  automated exposure control; adjustment of mA and/or kV according to the patients size (this includes techniques or standardized protocols for targeted exams where dose is matched to indication/reason for exam: i.e. extremities or head);  iterative reconstruction technique.      Electronically signed by: Luis Alfredo Gray  Date:    10/22/2020  Time:    20:35             Narrative:    EXAMINATION:  CT HEAD WITHOUT CONTRAST    CLINICAL HISTORY:  Headache, post traumatic;    TECHNIQUE:  Low dose axial CT images obtained throughout the head without intravenous contrast. Sagittal and coronal reconstructions were performed.    COMPARISON:  03/06/2020.    FINDINGS:  Intracranial compartment:    Ventricles and sulci are normal in size for age without evidence of hydrocephalus. No extra-axial blood or fluid collections.    Moderate chronic microvascular ischemic changes.  No parenchymal mass, hemorrhage, edema or major vascular distribution infarct.    Skull/extracranial contents (limited evaluation): Extra calvarial right frontal soft tissue hematoma.  No fracture. Mastoid air cells and paranasal sinuses are essentially clear.                                 The EKG was ordered, reviewed, and independently interpreted by the ED provider.  Interpretation time: 20:33  Rate: 80 BPM  Rhythm: normal sinus rhythm  Interpretation: Anteroseptal infarct. No STEMI.      The Emergency  Provider reviewed the vital signs and test results, which are outlined above.     ED Discussion     7:58 PM: Dr. Goddard transfers care of patient to Dr. Travis pending imaging results.    9:34 PM: Re-evaluated pt. Pt is resting comfortably and is in no acute distress. D/w pt all pertinent results. D/w pt any concerns expressed at this time. Answered all questions. Pt expresses understanding at this time.    10:00 PM: Reassessed pt at this time.  Pt states her condition has improved at this time. Discussed with pt all pertinent ED information and results. Discussed pt dx and plan of tx. Gave pt all f/u and return to the ED instructions. All questions and concerns were addressed at this time. Pt expresses understanding of information and instructions, and is comfortable with plan to discharge. Pt is stable for discharge.    I discussed with patient and/or family/caretaker that evaluation in the ED does not suggest any emergent or life threatening medical conditions requiring immediate intervention beyond what was provided in the ED, and I believe patient is safe for discharge.  Regardless, an unremarkable evaluation in the ED does not preclude the development or presence of a serious of life threatening condition. As such, patient was instructed to return immediately for any worsening or change in current symptoms.       MDM        Medical Decision Making:   Clinical Tests:   Lab Tests: Ordered and Reviewed  Radiological Study: Ordered and Reviewed  Medical Tests: Ordered and Reviewed           ED Medication(s):  Medications   acetaminophen tablet 1,000 mg (1,000 mg Oral Given 10/22/20 1947)       Discharge Medication List as of 10/22/2020  9:59 PM          Follow-up Information     Rob Sauceda MD In 4 days.    Specialty: Internal Medicine  Contact information:  [address]   [address]  [facility] [address]  [phone]             [facility] - .    Specialty: Emergency Medicine  Why: As  needed, If symptoms worsen  Contact information:  [address]  [facility] [address] [phone]                     Scribe Attestation:   Scribe #1: I performed the above scribed service and the documentation accurately describes the services I performed. I attest to the accuracy of the note.     Attending:   Physician Attestation Statement for Scribe #1: I, Sandie Goddard MD, personally performed the services described in this documentation, as scribed by Andria Peng, in my presence, and it is both accurate and complete.       Scribe Attestation:   Scribe #2: I performed the above scribed service and the documentation accurately describes the services I performed. I attest to the accuracy of the note.    Attending Attestation:           Physician Attestation for Scribe:    Physician Attestation Statement for Scribe #2: I, Radu Travis MD, reviewed documentation, as scribed by Cathryn Campoverde in my presence, and it is both accurate and complete. I also acknowledge and confirm the content of the note done by Scribe #1.           Clinical Impression       ICD-10-CM ICD-9-CM   1. Closed head injury, initial encounter  S09.90XA 959.01   2. Weakness  R53.1 780.79   3. Contusion of forehead, initial encounter  S00.83XA 920       Disposition:   Disposition: Discharged  Condition: Stable         Radu Travis MD  10/27/20 1954

## 2020-10-23 NOTE — TELEPHONE ENCOUNTER
Spoke with patients daughter / care giver states that her mother was seen yesterday and everything has been squared away.

## 2020-12-31 ENCOUNTER — PES CALL (OUTPATIENT)
Dept: ADMINISTRATIVE | Facility: CLINIC | Age: 70
End: 2020-12-31

## 2021-01-27 ENCOUNTER — LAB VISIT (OUTPATIENT)
Dept: LAB | Facility: HOSPITAL | Age: 71
End: 2021-01-27
Attending: INTERNAL MEDICINE
Payer: MEDICARE

## 2021-01-27 ENCOUNTER — OFFICE VISIT (OUTPATIENT)
Dept: INTERNAL MEDICINE | Facility: CLINIC | Age: 71
End: 2021-01-27
Payer: MEDICARE

## 2021-01-27 VITALS
WEIGHT: 157.19 LBS | HEART RATE: 81 BPM | DIASTOLIC BLOOD PRESSURE: 80 MMHG | HEIGHT: 64 IN | TEMPERATURE: 98 F | RESPIRATION RATE: 20 BRPM | OXYGEN SATURATION: 98 % | SYSTOLIC BLOOD PRESSURE: 122 MMHG | BODY MASS INDEX: 26.84 KG/M2

## 2021-01-27 DIAGNOSIS — G30.1 LATE ONSET ALZHEIMER'S DISEASE WITHOUT BEHAVIORAL DISTURBANCE: ICD-10-CM

## 2021-01-27 DIAGNOSIS — G20.A1 PARKINSON DISEASE: Primary | ICD-10-CM

## 2021-01-27 DIAGNOSIS — K21.9 GASTROESOPHAGEAL REFLUX DISEASE, UNSPECIFIED WHETHER ESOPHAGITIS PRESENT: ICD-10-CM

## 2021-01-27 DIAGNOSIS — Z99.89 DEPENDENCE ON OTHER ENABLING MACHINES AND DEVICES: ICD-10-CM

## 2021-01-27 DIAGNOSIS — E11.59 HYPERTENSION ASSOCIATED WITH DIABETES: ICD-10-CM

## 2021-01-27 DIAGNOSIS — E78.5 HYPERLIPIDEMIA ASSOCIATED WITH TYPE 2 DIABETES MELLITUS: ICD-10-CM

## 2021-01-27 DIAGNOSIS — K59.09 CHRONIC CONSTIPATION: ICD-10-CM

## 2021-01-27 DIAGNOSIS — Z91.81 RISK FOR FALLS: ICD-10-CM

## 2021-01-27 DIAGNOSIS — I25.10 CORONARY ARTERY DISEASE, ANGINA PRESENCE UNSPECIFIED, UNSPECIFIED VESSEL OR LESION TYPE, UNSPECIFIED WHETHER NATIVE OR TRANSPLANTED HEART: ICD-10-CM

## 2021-01-27 DIAGNOSIS — E11.8 TYPE II DIABETES MELLITUS WITH COMPLICATION: ICD-10-CM

## 2021-01-27 DIAGNOSIS — Z85.3 HISTORY OF BREAST CANCER: ICD-10-CM

## 2021-01-27 DIAGNOSIS — F33.1 MODERATE EPISODE OF RECURRENT MAJOR DEPRESSIVE DISORDER: ICD-10-CM

## 2021-01-27 DIAGNOSIS — E11.69 HYPERLIPIDEMIA ASSOCIATED WITH TYPE 2 DIABETES MELLITUS: ICD-10-CM

## 2021-01-27 DIAGNOSIS — Z86.73 HISTORY OF CVA (CEREBROVASCULAR ACCIDENT): ICD-10-CM

## 2021-01-27 DIAGNOSIS — Z00.00 ENCOUNTER FOR PREVENTIVE HEALTH EXAMINATION: ICD-10-CM

## 2021-01-27 DIAGNOSIS — I70.0 AORTIC ATHEROSCLEROSIS: ICD-10-CM

## 2021-01-27 DIAGNOSIS — F02.80 LATE ONSET ALZHEIMER'S DISEASE WITHOUT BEHAVIORAL DISTURBANCE: ICD-10-CM

## 2021-01-27 DIAGNOSIS — G20.A1 PARKINSON'S DISEASE: ICD-10-CM

## 2021-01-27 DIAGNOSIS — I15.2 HYPERTENSION ASSOCIATED WITH DIABETES: ICD-10-CM

## 2021-01-27 DIAGNOSIS — R26.9 ABNORMALITY OF GAIT AND MOBILITY: ICD-10-CM

## 2021-01-27 PROBLEM — E10.51 TYPE 1 DIABETES MELLITUS WITH DIABETIC PERIPHERAL ANGIOPATHY WITHOUT GANGRENE: Status: ACTIVE | Noted: 2021-01-27

## 2021-01-27 PROBLEM — E10.51 TYPE 1 DIABETES MELLITUS WITH DIABETIC PERIPHERAL ANGIOPATHY WITHOUT GANGRENE: Status: RESOLVED | Noted: 2021-01-27 | Resolved: 2021-01-27

## 2021-01-27 LAB
BASOPHILS # BLD AUTO: 0.05 K/UL (ref 0–0.2)
BASOPHILS NFR BLD: 0.9 % (ref 0–1.9)
DIFFERENTIAL METHOD: NORMAL
EOSINOPHIL # BLD AUTO: 0.2 K/UL (ref 0–0.5)
EOSINOPHIL NFR BLD: 2.7 % (ref 0–8)
ERYTHROCYTE [DISTWIDTH] IN BLOOD BY AUTOMATED COUNT: 12.4 % (ref 11.5–14.5)
HCT VFR BLD AUTO: 42.5 % (ref 37–48.5)
HGB BLD-MCNC: 13.6 G/DL (ref 12–16)
IMM GRANULOCYTES # BLD AUTO: 0.01 K/UL (ref 0–0.04)
IMM GRANULOCYTES NFR BLD AUTO: 0.2 % (ref 0–0.5)
LYMPHOCYTES # BLD AUTO: 1.1 K/UL (ref 1–4.8)
LYMPHOCYTES NFR BLD: 18.2 % (ref 18–48)
MCH RBC QN AUTO: 30.7 PG (ref 27–31)
MCHC RBC AUTO-ENTMCNC: 32 G/DL (ref 32–36)
MCV RBC AUTO: 96 FL (ref 82–98)
MONOCYTES # BLD AUTO: 0.4 K/UL (ref 0.3–1)
MONOCYTES NFR BLD: 6.5 % (ref 4–15)
NEUTROPHILS # BLD AUTO: 4.2 K/UL (ref 1.8–7.7)
NEUTROPHILS NFR BLD: 71.5 % (ref 38–73)
NRBC BLD-RTO: 0 /100 WBC
PLATELET # BLD AUTO: 279 K/UL (ref 150–350)
PMV BLD AUTO: 10.2 FL (ref 9.2–12.9)
RBC # BLD AUTO: 4.43 M/UL (ref 4–5.4)
WBC # BLD AUTO: 5.82 K/UL (ref 3.9–12.7)

## 2021-01-27 PROCEDURE — 84443 ASSAY THYROID STIM HORMONE: CPT

## 2021-01-27 PROCEDURE — G0439 PR MEDICARE ANNUAL WELLNESS SUBSEQUENT VISIT: ICD-10-PCS | Mod: ,,, | Performed by: NURSE PRACTITIONER

## 2021-01-27 PROCEDURE — 90694 VACC AIIV4 NO PRSRV 0.5ML IM: CPT | Mod: PBBFAC,PO

## 2021-01-27 PROCEDURE — 99214 OFFICE O/P EST MOD 30 MIN: CPT | Mod: PBBFAC,PO,25 | Performed by: NURSE PRACTITIONER

## 2021-01-27 PROCEDURE — G0439 PPPS, SUBSEQ VISIT: HCPCS | Mod: ,,, | Performed by: NURSE PRACTITIONER

## 2021-01-27 PROCEDURE — 83036 HEMOGLOBIN GLYCOSYLATED A1C: CPT

## 2021-01-27 PROCEDURE — G0008 ADMIN INFLUENZA VIRUS VAC: HCPCS | Mod: PBBFAC,PO

## 2021-01-27 PROCEDURE — 85025 COMPLETE CBC W/AUTO DIFF WBC: CPT

## 2021-01-27 PROCEDURE — 36415 COLL VENOUS BLD VENIPUNCTURE: CPT | Mod: PO

## 2021-01-27 PROCEDURE — 80061 LIPID PANEL: CPT

## 2021-01-27 PROCEDURE — 80053 COMPREHEN METABOLIC PANEL: CPT

## 2021-01-27 PROCEDURE — 99999 PR PBB SHADOW E&M-EST. PATIENT-LVL IV: ICD-10-PCS | Mod: PBBFAC,,, | Performed by: NURSE PRACTITIONER

## 2021-01-27 PROCEDURE — 99999 PR PBB SHADOW E&M-EST. PATIENT-LVL IV: CPT | Mod: PBBFAC,,, | Performed by: NURSE PRACTITIONER

## 2021-01-28 ENCOUNTER — TELEPHONE (OUTPATIENT)
Dept: INTERNAL MEDICINE | Facility: CLINIC | Age: 71
End: 2021-01-28

## 2021-01-28 LAB
ALBUMIN SERPL BCP-MCNC: 3.8 G/DL (ref 3.5–5.2)
ALP SERPL-CCNC: 60 U/L (ref 55–135)
ALT SERPL W/O P-5'-P-CCNC: 9 U/L (ref 10–44)
ANION GAP SERPL CALC-SCNC: 9 MMOL/L (ref 8–16)
AST SERPL-CCNC: 14 U/L (ref 10–40)
BILIRUB SERPL-MCNC: 0.3 MG/DL (ref 0.1–1)
BUN SERPL-MCNC: 19 MG/DL (ref 8–23)
CALCIUM SERPL-MCNC: 9.5 MG/DL (ref 8.7–10.5)
CHLORIDE SERPL-SCNC: 106 MMOL/L (ref 95–110)
CHOLEST SERPL-MCNC: 199 MG/DL (ref 120–199)
CHOLEST/HDLC SERPL: 3.2 {RATIO} (ref 2–5)
CO2 SERPL-SCNC: 29 MMOL/L (ref 23–29)
CREAT SERPL-MCNC: 1 MG/DL (ref 0.5–1.4)
EST. GFR  (AFRICAN AMERICAN): >60 ML/MIN/1.73 M^2
EST. GFR  (NON AFRICAN AMERICAN): 56.4 ML/MIN/1.73 M^2
ESTIMATED AVG GLUCOSE: 137 MG/DL (ref 68–131)
GLUCOSE SERPL-MCNC: 174 MG/DL (ref 70–110)
HBA1C MFR BLD HPLC: 6.4 % (ref 4–5.6)
HDLC SERPL-MCNC: 63 MG/DL (ref 40–75)
HDLC SERPL: 31.7 % (ref 20–50)
LDLC SERPL CALC-MCNC: 108 MG/DL (ref 63–159)
NONHDLC SERPL-MCNC: 136 MG/DL
POTASSIUM SERPL-SCNC: 5.2 MMOL/L (ref 3.5–5.1)
PROT SERPL-MCNC: 6.8 G/DL (ref 6–8.4)
SODIUM SERPL-SCNC: 144 MMOL/L (ref 136–145)
TRIGL SERPL-MCNC: 140 MG/DL (ref 30–150)
TSH SERPL DL<=0.005 MIU/L-ACNC: 2.86 UIU/ML (ref 0.4–4)

## 2021-02-03 ENCOUNTER — OFFICE VISIT (OUTPATIENT)
Dept: INTERNAL MEDICINE | Facility: CLINIC | Age: 71
End: 2021-02-03
Payer: MEDICARE

## 2021-02-03 VITALS
TEMPERATURE: 99 F | SYSTOLIC BLOOD PRESSURE: 126 MMHG | HEIGHT: 64 IN | DIASTOLIC BLOOD PRESSURE: 74 MMHG | BODY MASS INDEX: 26.69 KG/M2 | HEART RATE: 77 BPM | WEIGHT: 156.31 LBS | OXYGEN SATURATION: 99 %

## 2021-02-03 DIAGNOSIS — K21.9 GASTROESOPHAGEAL REFLUX DISEASE, UNSPECIFIED WHETHER ESOPHAGITIS PRESENT: ICD-10-CM

## 2021-02-03 DIAGNOSIS — I25.10 CORONARY ARTERY DISEASE, ANGINA PRESENCE UNSPECIFIED, UNSPECIFIED VESSEL OR LESION TYPE, UNSPECIFIED WHETHER NATIVE OR TRANSPLANTED HEART: ICD-10-CM

## 2021-02-03 DIAGNOSIS — F02.80 LATE ONSET ALZHEIMER'S DISEASE WITHOUT BEHAVIORAL DISTURBANCE: ICD-10-CM

## 2021-02-03 DIAGNOSIS — E11.69 HYPERLIPIDEMIA ASSOCIATED WITH TYPE 2 DIABETES MELLITUS: Primary | ICD-10-CM

## 2021-02-03 DIAGNOSIS — I15.2 HYPERTENSION ASSOCIATED WITH DIABETES: ICD-10-CM

## 2021-02-03 DIAGNOSIS — G20.A1 PARKINSON DISEASE: ICD-10-CM

## 2021-02-03 DIAGNOSIS — G30.1 LATE ONSET ALZHEIMER'S DISEASE WITHOUT BEHAVIORAL DISTURBANCE: ICD-10-CM

## 2021-02-03 DIAGNOSIS — Z85.3 HISTORY OF BREAST CANCER: ICD-10-CM

## 2021-02-03 DIAGNOSIS — I70.0 AORTIC ATHEROSCLEROSIS: ICD-10-CM

## 2021-02-03 DIAGNOSIS — E11.59 HYPERTENSION ASSOCIATED WITH DIABETES: ICD-10-CM

## 2021-02-03 DIAGNOSIS — E11.8 TYPE II DIABETES MELLITUS WITH COMPLICATION: ICD-10-CM

## 2021-02-03 DIAGNOSIS — Z91.81 RISK FOR FALLS: ICD-10-CM

## 2021-02-03 DIAGNOSIS — F33.1 MODERATE EPISODE OF RECURRENT MAJOR DEPRESSIVE DISORDER: ICD-10-CM

## 2021-02-03 DIAGNOSIS — E78.5 HYPERLIPIDEMIA ASSOCIATED WITH TYPE 2 DIABETES MELLITUS: Primary | ICD-10-CM

## 2021-02-03 DIAGNOSIS — Z86.73 HISTORY OF CVA (CEREBROVASCULAR ACCIDENT): ICD-10-CM

## 2021-02-03 PROCEDURE — 99214 OFFICE O/P EST MOD 30 MIN: CPT | Mod: PBBFAC,PO | Performed by: INTERNAL MEDICINE

## 2021-02-03 PROCEDURE — 99999 PR PBB SHADOW E&M-EST. PATIENT-LVL IV: ICD-10-PCS | Mod: PBBFAC,,, | Performed by: INTERNAL MEDICINE

## 2021-02-03 PROCEDURE — 99999 PR PBB SHADOW E&M-EST. PATIENT-LVL IV: CPT | Mod: PBBFAC,,, | Performed by: INTERNAL MEDICINE

## 2021-02-03 PROCEDURE — 99214 OFFICE O/P EST MOD 30 MIN: CPT | Mod: S$PBB,,, | Performed by: INTERNAL MEDICINE

## 2021-02-03 PROCEDURE — 99214 PR OFFICE/OUTPT VISIT, EST, LEVL IV, 30-39 MIN: ICD-10-PCS | Mod: S$PBB,,, | Performed by: INTERNAL MEDICINE

## 2021-02-03 RX ORDER — BUPROPION HYDROCHLORIDE 150 MG/1
150 TABLET ORAL DAILY
Qty: 90 TABLET | Refills: 3 | Status: SHIPPED | OUTPATIENT
Start: 2021-02-03 | End: 2022-03-01

## 2021-02-08 ENCOUNTER — PATIENT OUTREACH (OUTPATIENT)
Dept: ADMINISTRATIVE | Facility: HOSPITAL | Age: 71
End: 2021-02-08

## 2021-02-09 ENCOUNTER — APPOINTMENT (OUTPATIENT)
Dept: RADIOLOGY | Facility: HOSPITAL | Age: 71
End: 2021-02-09
Attending: INTERNAL MEDICINE
Payer: MEDICARE

## 2021-02-09 DIAGNOSIS — M81.0 POSTMENOPAUSAL BONE LOSS: ICD-10-CM

## 2021-02-09 PROCEDURE — 77080 DXA BONE DENSITY AXIAL: CPT | Mod: 26,,, | Performed by: RADIOLOGY

## 2021-02-09 PROCEDURE — 77080 DXA BONE DENSITY AXIAL: CPT | Mod: TC

## 2021-02-09 PROCEDURE — 77080 DEXA BONE DENSITY SPINE HIP: ICD-10-PCS | Mod: 26,,, | Performed by: RADIOLOGY

## 2021-03-10 ENCOUNTER — TELEPHONE (OUTPATIENT)
Dept: INTERNAL MEDICINE | Facility: CLINIC | Age: 71
End: 2021-03-10

## 2021-04-05 ENCOUNTER — PATIENT OUTREACH (OUTPATIENT)
Dept: ADMINISTRATIVE | Facility: HOSPITAL | Age: 71
End: 2021-04-05

## 2021-08-01 ENCOUNTER — PATIENT MESSAGE (OUTPATIENT)
Dept: INTERNAL MEDICINE | Facility: CLINIC | Age: 71
End: 2021-08-01

## 2021-08-02 LAB
LEFT EYE DM RETINOPATHY: POSITIVE
RIGHT EYE DM RETINOPATHY: POSITIVE

## 2021-08-02 RX ORDER — AMLODIPINE BESYLATE 2.5 MG/1
2.5 TABLET ORAL DAILY
Qty: 90 TABLET | Refills: 0 | Status: SHIPPED | OUTPATIENT
Start: 2021-08-02 | End: 2021-10-03

## 2021-08-02 RX ORDER — LISINOPRIL 40 MG/1
40 TABLET ORAL DAILY
Qty: 90 TABLET | Refills: 0 | Status: SHIPPED | OUTPATIENT
Start: 2021-08-02 | End: 2021-11-26

## 2021-08-04 ENCOUNTER — PATIENT MESSAGE (OUTPATIENT)
Dept: ADMINISTRATIVE | Facility: HOSPITAL | Age: 71
End: 2021-08-04

## 2021-09-05 ENCOUNTER — PATIENT MESSAGE (OUTPATIENT)
Dept: INTERNAL MEDICINE | Facility: CLINIC | Age: 71
End: 2021-09-05

## 2021-09-07 RX ORDER — METFORMIN HYDROCHLORIDE 500 MG/1
500 TABLET ORAL 2 TIMES DAILY WITH MEALS
Qty: 60 TABLET | Refills: 0 | Status: SHIPPED | OUTPATIENT
Start: 2021-09-07 | End: 2021-12-02 | Stop reason: SDUPTHER

## 2021-09-29 DIAGNOSIS — E11.9 TYPE 2 DIABETES MELLITUS WITHOUT COMPLICATION: ICD-10-CM

## 2021-10-05 ENCOUNTER — TELEPHONE (OUTPATIENT)
Dept: INTERNAL MEDICINE | Facility: CLINIC | Age: 71
End: 2021-10-05

## 2021-10-06 ENCOUNTER — LAB VISIT (OUTPATIENT)
Dept: LAB | Facility: HOSPITAL | Age: 71
End: 2021-10-06
Attending: INTERNAL MEDICINE
Payer: MEDICARE

## 2021-10-06 DIAGNOSIS — E11.8 TYPE II DIABETES MELLITUS WITH COMPLICATION: ICD-10-CM

## 2021-10-06 LAB
ANION GAP SERPL CALC-SCNC: 15 MMOL/L (ref 8–16)
BASOPHILS # BLD AUTO: 0.04 K/UL (ref 0–0.2)
BASOPHILS NFR BLD: 0.9 % (ref 0–1.9)
BUN SERPL-MCNC: 18 MG/DL (ref 8–23)
CALCIUM SERPL-MCNC: 9.9 MG/DL (ref 8.7–10.5)
CHLORIDE SERPL-SCNC: 104 MMOL/L (ref 95–110)
CHOLEST SERPL-MCNC: 177 MG/DL (ref 120–199)
CHOLEST/HDLC SERPL: 3.5 {RATIO} (ref 2–5)
CO2 SERPL-SCNC: 23 MMOL/L (ref 23–29)
CREAT SERPL-MCNC: 0.8 MG/DL (ref 0.5–1.4)
DIFFERENTIAL METHOD: NORMAL
EOSINOPHIL # BLD AUTO: 0.1 K/UL (ref 0–0.5)
EOSINOPHIL NFR BLD: 3 % (ref 0–8)
ERYTHROCYTE [DISTWIDTH] IN BLOOD BY AUTOMATED COUNT: 12.8 % (ref 11.5–14.5)
EST. GFR  (AFRICAN AMERICAN): >60 ML/MIN/1.73 M^2
EST. GFR  (NON AFRICAN AMERICAN): >60 ML/MIN/1.73 M^2
ESTIMATED AVG GLUCOSE: 166 MG/DL (ref 68–131)
GLUCOSE SERPL-MCNC: 143 MG/DL (ref 70–110)
HBA1C MFR BLD: 7.4 % (ref 4–5.6)
HCT VFR BLD AUTO: 39.1 % (ref 37–48.5)
HDLC SERPL-MCNC: 51 MG/DL (ref 40–75)
HDLC SERPL: 28.8 % (ref 20–50)
HGB BLD-MCNC: 13 G/DL (ref 12–16)
IMM GRANULOCYTES # BLD AUTO: 0.01 K/UL (ref 0–0.04)
IMM GRANULOCYTES NFR BLD AUTO: 0.2 % (ref 0–0.5)
LDLC SERPL CALC-MCNC: 98.4 MG/DL (ref 63–159)
LYMPHOCYTES # BLD AUTO: 1.2 K/UL (ref 1–4.8)
LYMPHOCYTES NFR BLD: 26.8 % (ref 18–48)
MCH RBC QN AUTO: 30.7 PG (ref 27–31)
MCHC RBC AUTO-ENTMCNC: 33.2 G/DL (ref 32–36)
MCV RBC AUTO: 92 FL (ref 82–98)
MONOCYTES # BLD AUTO: 0.3 K/UL (ref 0.3–1)
MONOCYTES NFR BLD: 6.7 % (ref 4–15)
NEUTROPHILS # BLD AUTO: 2.9 K/UL (ref 1.8–7.7)
NEUTROPHILS NFR BLD: 62.4 % (ref 38–73)
NONHDLC SERPL-MCNC: 126 MG/DL
NRBC BLD-RTO: 0 /100 WBC
PLATELET # BLD AUTO: 234 K/UL (ref 150–450)
PMV BLD AUTO: 10.5 FL (ref 9.2–12.9)
POTASSIUM SERPL-SCNC: 4.5 MMOL/L (ref 3.5–5.1)
RBC # BLD AUTO: 4.24 M/UL (ref 4–5.4)
SODIUM SERPL-SCNC: 142 MMOL/L (ref 136–145)
T4 FREE SERPL-MCNC: 0.81 NG/DL (ref 0.71–1.51)
TRIGL SERPL-MCNC: 138 MG/DL (ref 30–150)
TSH SERPL DL<=0.005 MIU/L-ACNC: 4.09 UIU/ML (ref 0.4–4)
WBC # BLD AUTO: 4.62 K/UL (ref 3.9–12.7)

## 2021-10-06 PROCEDURE — 36415 COLL VENOUS BLD VENIPUNCTURE: CPT | Mod: PO | Performed by: INTERNAL MEDICINE

## 2021-10-06 PROCEDURE — 80048 BASIC METABOLIC PNL TOTAL CA: CPT | Performed by: INTERNAL MEDICINE

## 2021-10-06 PROCEDURE — 80061 LIPID PANEL: CPT | Performed by: INTERNAL MEDICINE

## 2021-10-06 PROCEDURE — 84439 ASSAY OF FREE THYROXINE: CPT | Performed by: INTERNAL MEDICINE

## 2021-10-06 PROCEDURE — 84443 ASSAY THYROID STIM HORMONE: CPT | Performed by: INTERNAL MEDICINE

## 2021-10-06 PROCEDURE — 83036 HEMOGLOBIN GLYCOSYLATED A1C: CPT | Performed by: INTERNAL MEDICINE

## 2021-10-06 PROCEDURE — 85025 COMPLETE CBC W/AUTO DIFF WBC: CPT | Performed by: INTERNAL MEDICINE

## 2021-10-12 ENCOUNTER — OFFICE VISIT (OUTPATIENT)
Dept: INTERNAL MEDICINE | Facility: CLINIC | Age: 71
End: 2021-10-12
Payer: MEDICARE

## 2021-10-12 VITALS
HEIGHT: 62 IN | BODY MASS INDEX: 29.33 KG/M2 | SYSTOLIC BLOOD PRESSURE: 118 MMHG | WEIGHT: 159.38 LBS | DIASTOLIC BLOOD PRESSURE: 70 MMHG | OXYGEN SATURATION: 99 % | HEART RATE: 74 BPM

## 2021-10-12 DIAGNOSIS — Z86.73 HISTORY OF CVA (CEREBROVASCULAR ACCIDENT): ICD-10-CM

## 2021-10-12 DIAGNOSIS — F02.80 LATE ONSET ALZHEIMER'S DEMENTIA WITHOUT BEHAVIORAL DISTURBANCE: ICD-10-CM

## 2021-10-12 DIAGNOSIS — F33.1 MODERATE EPISODE OF RECURRENT MAJOR DEPRESSIVE DISORDER: ICD-10-CM

## 2021-10-12 DIAGNOSIS — I25.10 CORONARY ARTERY DISEASE, UNSPECIFIED VESSEL OR LESION TYPE, UNSPECIFIED WHETHER ANGINA PRESENT, UNSPECIFIED WHETHER NATIVE OR TRANSPLANTED HEART: ICD-10-CM

## 2021-10-12 DIAGNOSIS — Z91.81 RISK FOR FALLS: ICD-10-CM

## 2021-10-12 DIAGNOSIS — K59.09 CHRONIC CONSTIPATION: ICD-10-CM

## 2021-10-12 DIAGNOSIS — G20.A1 PARKINSON DISEASE: ICD-10-CM

## 2021-10-12 DIAGNOSIS — K21.9 GASTROESOPHAGEAL REFLUX DISEASE, UNSPECIFIED WHETHER ESOPHAGITIS PRESENT: ICD-10-CM

## 2021-10-12 DIAGNOSIS — E11.59 HYPERTENSION ASSOCIATED WITH DIABETES: ICD-10-CM

## 2021-10-12 DIAGNOSIS — G30.1 LATE ONSET ALZHEIMER'S DEMENTIA WITHOUT BEHAVIORAL DISTURBANCE: ICD-10-CM

## 2021-10-12 DIAGNOSIS — I70.0 AORTIC ATHEROSCLEROSIS: ICD-10-CM

## 2021-10-12 DIAGNOSIS — E78.5 HYPERLIPIDEMIA ASSOCIATED WITH TYPE 2 DIABETES MELLITUS: Primary | ICD-10-CM

## 2021-10-12 DIAGNOSIS — E11.8 TYPE II DIABETES MELLITUS WITH COMPLICATION: ICD-10-CM

## 2021-10-12 DIAGNOSIS — E11.69 HYPERLIPIDEMIA ASSOCIATED WITH TYPE 2 DIABETES MELLITUS: Primary | ICD-10-CM

## 2021-10-12 DIAGNOSIS — Z85.3 HISTORY OF BREAST CANCER: ICD-10-CM

## 2021-10-12 DIAGNOSIS — I15.2 HYPERTENSION ASSOCIATED WITH DIABETES: ICD-10-CM

## 2021-10-12 PROCEDURE — 99999 PR PBB SHADOW E&M-EST. PATIENT-LVL III: ICD-10-PCS | Mod: PBBFAC,,, | Performed by: INTERNAL MEDICINE

## 2021-10-12 PROCEDURE — 90694 VACC AIIV4 NO PRSRV 0.5ML IM: CPT | Mod: PBBFAC,PO

## 2021-10-12 PROCEDURE — 99214 PR OFFICE/OUTPT VISIT, EST, LEVL IV, 30-39 MIN: ICD-10-PCS | Mod: 25,S$PBB,, | Performed by: INTERNAL MEDICINE

## 2021-10-12 PROCEDURE — 99999 PR PBB SHADOW E&M-EST. PATIENT-LVL III: CPT | Mod: PBBFAC,,, | Performed by: INTERNAL MEDICINE

## 2021-10-12 PROCEDURE — 99214 OFFICE O/P EST MOD 30 MIN: CPT | Mod: 25,S$PBB,, | Performed by: INTERNAL MEDICINE

## 2021-10-12 PROCEDURE — 99213 OFFICE O/P EST LOW 20 MIN: CPT | Mod: PBBFAC,PO,25 | Performed by: INTERNAL MEDICINE

## 2021-10-12 PROCEDURE — G0008 ADMIN INFLUENZA VIRUS VAC: HCPCS | Mod: PBBFAC,PO

## 2021-10-12 RX ORDER — FUROSEMIDE 20 MG/1
20 TABLET ORAL EVERY OTHER DAY
COMMUNITY

## 2021-10-18 ENCOUNTER — PATIENT MESSAGE (OUTPATIENT)
Dept: ADMINISTRATIVE | Facility: HOSPITAL | Age: 71
End: 2021-10-18
Payer: MEDICARE

## 2021-10-20 ENCOUNTER — PATIENT OUTREACH (OUTPATIENT)
Dept: ADMINISTRATIVE | Facility: HOSPITAL | Age: 71
End: 2021-10-20

## 2021-12-06 ENCOUNTER — OFFICE VISIT (OUTPATIENT)
Dept: INTERNAL MEDICINE | Facility: CLINIC | Age: 71
End: 2021-12-06
Payer: MEDICARE

## 2021-12-06 VITALS
DIASTOLIC BLOOD PRESSURE: 82 MMHG | BODY MASS INDEX: 30.18 KG/M2 | HEART RATE: 83 BPM | OXYGEN SATURATION: 99 % | SYSTOLIC BLOOD PRESSURE: 122 MMHG | HEIGHT: 62 IN | WEIGHT: 164 LBS

## 2021-12-06 DIAGNOSIS — N39.46 MIXED STRESS AND URGE URINARY INCONTINENCE: Primary | ICD-10-CM

## 2021-12-06 PROCEDURE — 99999 PR PBB SHADOW E&M-EST. PATIENT-LVL IV: ICD-10-PCS | Mod: PBBFAC,,, | Performed by: INTERNAL MEDICINE

## 2021-12-06 PROCEDURE — 99213 PR OFFICE/OUTPT VISIT, EST, LEVL III, 20-29 MIN: ICD-10-PCS | Mod: S$PBB,,, | Performed by: INTERNAL MEDICINE

## 2021-12-06 PROCEDURE — 99999 PR PBB SHADOW E&M-EST. PATIENT-LVL IV: CPT | Mod: PBBFAC,,, | Performed by: INTERNAL MEDICINE

## 2021-12-06 PROCEDURE — 99214 OFFICE O/P EST MOD 30 MIN: CPT | Mod: PBBFAC,PO | Performed by: INTERNAL MEDICINE

## 2021-12-06 PROCEDURE — 99213 OFFICE O/P EST LOW 20 MIN: CPT | Mod: S$PBB,,, | Performed by: INTERNAL MEDICINE

## 2021-12-06 RX ORDER — OXYBUTYNIN CHLORIDE 15 MG/1
15 TABLET, EXTENDED RELEASE ORAL DAILY
Qty: 90 TABLET | Refills: 3 | Status: SHIPPED | OUTPATIENT
Start: 2021-12-06 | End: 2022-12-06

## 2021-12-09 ENCOUNTER — LAB VISIT (OUTPATIENT)
Dept: LAB | Facility: HOSPITAL | Age: 71
End: 2021-12-09
Attending: INTERNAL MEDICINE
Payer: MEDICARE

## 2021-12-09 DIAGNOSIS — E11.9 TYPE 2 DIABETES MELLITUS WITHOUT COMPLICATION: ICD-10-CM

## 2021-12-09 PROCEDURE — 82570 ASSAY OF URINE CREATININE: CPT | Performed by: INTERNAL MEDICINE

## 2021-12-10 LAB
ALBUMIN/CREAT UR: NORMAL UG/MG (ref 0–30)
CREAT UR-MCNC: 37 MG/DL (ref 15–325)
MICROALBUMIN UR DL<=1MG/L-MCNC: <5 UG/ML

## 2021-12-17 ENCOUNTER — TELEPHONE (OUTPATIENT)
Dept: INTERNAL MEDICINE | Facility: CLINIC | Age: 71
End: 2021-12-17
Payer: MEDICARE

## 2021-12-18 RX ORDER — GLIMEPIRIDE 4 MG/1
TABLET ORAL
Qty: 90 TABLET | Refills: 1 | Status: SHIPPED | OUTPATIENT
Start: 2021-12-18 | End: 2021-12-18 | Stop reason: SDUPTHER

## 2021-12-18 RX ORDER — GLIMEPIRIDE 4 MG/1
4 TABLET ORAL DAILY
Qty: 90 TABLET | Refills: 1 | Status: SHIPPED | OUTPATIENT
Start: 2021-12-18

## 2022-01-01 ENCOUNTER — PATIENT MESSAGE (OUTPATIENT)
Dept: ADMINISTRATIVE | Facility: CLINIC | Age: 72
End: 2022-01-01
Payer: MEDICARE

## 2022-01-01 ENCOUNTER — HOSPITAL ENCOUNTER (EMERGENCY)
Facility: HOSPITAL | Age: 72
Discharge: HOME | End: 2022-01-01
Attending: EMERGENCY MEDICINE
Payer: MEDICARE

## 2022-01-01 VITALS
OXYGEN SATURATION: 98 % | SYSTOLIC BLOOD PRESSURE: 106 MMHG | TEMPERATURE: 99 F | RESPIRATION RATE: 16 BRPM | HEART RATE: 74 BPM | DIASTOLIC BLOOD PRESSURE: 82 MMHG

## 2022-01-01 DIAGNOSIS — U07.1 COVID-19: Primary | ICD-10-CM

## 2022-01-01 PROCEDURE — 99283 EMERGENCY DEPT VISIT LOW MDM: CPT | Mod: 25

## 2022-01-01 PROCEDURE — 25000003 PHARM REV CODE 250: Performed by: NURSE PRACTITIONER

## 2022-01-01 RX ORDER — ACETAMINOPHEN 500 MG
1000 TABLET ORAL
Status: COMPLETED | OUTPATIENT
Start: 2022-01-01 | End: 2022-01-01

## 2022-01-01 RX ADMIN — ACETAMINOPHEN 1000 MG: 500 TABLET ORAL at 12:01

## 2022-01-01 NOTE — DISCHARGE INSTRUCTIONS
GO HOME AND QUARANTINE FOR 5-10 DAYS FROM ONSET OF SYMPTOMS   GET PLENTY OF REST  DRINK PLENTY OF FLUIDS  TAKE TYLENOL  AS DIRECTED ON THE LABEL AS NEEDED FOR FEVER, ACHES, AND PAIN  RETURN TO ER WITH WORSENING SYMPTOMS, CHEST PAIN, SHORTNESS OF BREATH, DIFFICULTY BREATHING, OR ANY NEW OR CONCERNING SYMPTOMS

## 2022-01-01 NOTE — ED PROVIDER NOTES
Encounter Date: 1/1/2022       History     Chief Complaint   Patient presents with    Generalized Body Aches     Pt. Presents to ED via EMS due to generalized neck pain and body aches, as well as being covid positive. Pt states upon arrival to ED her pain has subsided      71 year old female presents to ER via EMS with c/o neck pain, body aches, fever, cough, nasal congestion, sore throat, and ear pain. Pt states she was dx with covid yesterday. She denies SOB or CP. She reports having s/s for several days. She took Tylenol early this morning. She states she was given some medication when she was dx with covid but is unsure what she got.     The history is provided by the patient.     Review of patient's allergies indicates:   Allergen Reactions    Morphine Hives    Donepezil Nausea And Vomiting     Past Medical History:   Diagnosis Date    CAD (coronary artery disease)     s/p stent 2016    Chronic constipation     Dementia     GERD (gastroesophageal reflux disease)     History of breast cancer     bilateral breast, XRT both times, no ctx    History of CVA (cerebrovascular accident)     Hyperlipidemia associated with type 2 diabetes mellitus     Hypertension associated with diabetes     Major depression, recurrent     Parkinson disease     Type II diabetes mellitus with complication      Past Surgical History:   Procedure Laterality Date    ADENOIDECTOMY      BACK SURGERY      BREAST LUMPECTOMY      CHOLECYSTECTOMY      HYSTERECTOMY      TONSILLECTOMY       Family History   Problem Relation Age of Onset    Diabetes Mother      Social History     Tobacco Use    Smoking status: Former Smoker    Smokeless tobacco: Never Used   Substance Use Topics    Alcohol use: No    Drug use: No     Review of Systems   Constitutional: Positive for chills and fever.   HENT: Positive for congestion, ear pain and sore throat.    Respiratory: Positive for cough. Negative for shortness of breath.     Cardiovascular: Negative for chest pain.   Gastrointestinal: Negative for nausea and vomiting.   Musculoskeletal: Positive for myalgias and neck pain.   Neurological: Positive for headaches.   All other systems reviewed and are negative.      Physical Exam     Initial Vitals [01/01/22 1149]   BP Pulse Resp Temp SpO2   106/82 74 16 99.3 °F (37.4 °C) 98 %      MAP       --         Physical Exam    Nursing note and vitals reviewed.  Constitutional: Vital signs are normal. She appears well-developed and well-nourished. She is not diaphoretic. She is cooperative.  Non-toxic appearance. She does not have a sickly appearance. She does not appear ill. No distress.   Elderly female in NAD. She is afebrile.    HENT:   Head: Normocephalic and atraumatic.   Right Ear: Tympanic membrane normal.   Left Ear: Tympanic membrane normal.   Nose: Nose normal.   Mouth/Throat: Uvula is midline and mucous membranes are normal. Posterior oropharyngeal erythema (mild streaking ) present. No oropharyngeal exudate, posterior oropharyngeal edema or tonsillar abscesses.   Eyes: Conjunctivae and EOM are normal. No scleral icterus.   Neck: Trachea normal. No tracheal deviation present.   Normal range of motion.  Cardiovascular: Normal rate and regular rhythm.   Pulmonary/Chest: Effort normal and breath sounds normal. No tachypnea and no bradypnea. No respiratory distress. She has no decreased breath sounds. She has no wheezes. She has no rhonchi. She has no rales.   Musculoskeletal:         General: No edema. Normal range of motion.      Cervical back: Normal range of motion.     Neurological: She is alert and oriented to person, place, and time. She displays no atrophy. GCS eye subscore is 4. GCS verbal subscore is 5. GCS motor subscore is 6.   Skin: Skin is warm and dry.   Psychiatric: She has a normal mood and affect. Her speech is normal.         ED Course   Procedures  Labs Reviewed - No data to display       Imaging Results    None           Medications   acetaminophen tablet 1,000 mg (1,000 mg Oral Given 1/1/22 1214)                          Clinical Impression:   Final diagnoses:  [U07.1] COVID-19 (Primary)          ED Disposition Condition    Discharge Stable        ED Prescriptions     Medication Sig Dispense Start Date End Date Auth. Provider    pulse oximeter (PULSE OXIMETER) device by Apply Externally route 2 (two) times a day. Use twice daily at 8 AM and 3 PM and record the value in MyChart as directed. 1 each 1/1/2022  Laurel Mata NP    brompheniramin-phenylephrin-DM (RYNEX DM) 1-2.5-5 mg/5 mL Soln Take 10 mLs by mouth every 4 (four) hours as needed (Cough and Congestion). 150 mL 1/1/2022 1/11/2022 Laurel Mata NP        Follow-up Information     Follow up With Specialties Details Why Contact Info    Rob Sauceda MD Internal Medicine Schedule an appointment as soon as possible for a visit in 1 week FOR FOLLOW UP [address]  [address]  [facility] [address]  [phone]      [facility] Emergency Dept. Emergency Medicine  As needed, If symptoms worsen [id number] [address]  [facility] [address] [phone]           Laurel Mata NP  01/01/22 1221      Attending Attestation:     I have reviewed the APC's H&P, assessment, and plan, and agree with their findings and interpretations as documented.  I was available in the emergency department during patient's encounter.     Mari Mayorga is a 71 y.o. female who presents with viral sx. The plan is supportive care at home.    MD Xavier Gallardo MD  01/05/22 1079

## 2022-01-03 ENCOUNTER — PATIENT MESSAGE (OUTPATIENT)
Dept: ADMINISTRATIVE | Facility: CLINIC | Age: 72
End: 2022-01-03
Payer: MEDICARE

## 2022-01-03 ENCOUNTER — TELEPHONE (OUTPATIENT)
Dept: ADMINISTRATIVE | Facility: CLINIC | Age: 72
End: 2022-01-03
Payer: MEDICARE

## 2022-01-04 ENCOUNTER — PATIENT MESSAGE (OUTPATIENT)
Dept: ADMINISTRATIVE | Facility: CLINIC | Age: 72
End: 2022-01-04
Payer: MEDICARE

## 2022-01-04 ENCOUNTER — TELEPHONE (OUTPATIENT)
Dept: ADMINISTRATIVE | Facility: CLINIC | Age: 72
End: 2022-01-04
Payer: MEDICARE

## 2022-01-04 ENCOUNTER — NURSE TRIAGE (OUTPATIENT)
Dept: ADMINISTRATIVE | Facility: CLINIC | Age: 72
End: 2022-01-04
Payer: MEDICARE

## 2022-01-04 NOTE — TELEPHONE ENCOUNTER
Per PES agent pt  Contacted x2 with no contact made. Tasks will remain in place. Will enroll into [facility] program        placeReason for Disposition   Caller has cancelled the call before the first contact    Protocols used: NO CONTACT OR DUPLICATE CONTACT CALL-A-AH

## 2022-01-06 ENCOUNTER — INFUSION (OUTPATIENT)
Dept: INFECTIOUS DISEASES | Facility: HOSPITAL | Age: 72
End: 2022-01-06
Attending: EMERGENCY MEDICINE
Payer: MEDICARE

## 2022-01-06 ENCOUNTER — TELEPHONE (OUTPATIENT)
Dept: INTERNAL MEDICINE | Facility: CLINIC | Age: 72
End: 2022-01-06
Payer: MEDICARE

## 2022-01-06 ENCOUNTER — HOSPITAL ENCOUNTER (EMERGENCY)
Facility: HOSPITAL | Age: 72
Discharge: HOME | End: 2022-01-06
Attending: EMERGENCY MEDICINE
Payer: MEDICARE

## 2022-01-06 VITALS
HEART RATE: 86 BPM | DIASTOLIC BLOOD PRESSURE: 83 MMHG | BODY MASS INDEX: 30.18 KG/M2 | SYSTOLIC BLOOD PRESSURE: 150 MMHG | WEIGHT: 164 LBS | HEIGHT: 62 IN | OXYGEN SATURATION: 97 % | RESPIRATION RATE: 15 BRPM | TEMPERATURE: 98 F

## 2022-01-06 VITALS
DIASTOLIC BLOOD PRESSURE: 59 MMHG | SYSTOLIC BLOOD PRESSURE: 132 MMHG | HEART RATE: 77 BPM | RESPIRATION RATE: 15 BRPM | TEMPERATURE: 98 F | OXYGEN SATURATION: 95 %

## 2022-01-06 DIAGNOSIS — U07.1 COVID: ICD-10-CM

## 2022-01-06 DIAGNOSIS — U07.1 COVID-19: Primary | ICD-10-CM

## 2022-01-06 DIAGNOSIS — R53.1 WEAKNESS: ICD-10-CM

## 2022-01-06 DIAGNOSIS — G20.A1 PARKINSONS: ICD-10-CM

## 2022-01-06 DIAGNOSIS — N30.00 ACUTE CYSTITIS WITHOUT HEMATURIA: Primary | ICD-10-CM

## 2022-01-06 LAB
ALBUMIN SERPL BCP-MCNC: 3.3 G/DL (ref 3.5–5.2)
ALP SERPL-CCNC: 60 U/L (ref 55–135)
ALT SERPL W/O P-5'-P-CCNC: 14 U/L (ref 10–44)
ANION GAP SERPL CALC-SCNC: 11 MMOL/L (ref 8–16)
AST SERPL-CCNC: 29 U/L (ref 10–40)
BACTERIA #/AREA URNS HPF: ABNORMAL /HPF
BASOPHILS # BLD AUTO: 0.01 K/UL (ref 0–0.2)
BASOPHILS NFR BLD: 0.1 % (ref 0–1.9)
BILIRUB SERPL-MCNC: 0.6 MG/DL (ref 0.1–1)
BILIRUB UR QL STRIP: NEGATIVE
BUN SERPL-MCNC: 17 MG/DL (ref 8–23)
CALCIUM SERPL-MCNC: 8.9 MG/DL (ref 8.7–10.5)
CHLORIDE SERPL-SCNC: 105 MMOL/L (ref 95–110)
CK SERPL-CCNC: 114 U/L (ref 20–180)
CLARITY UR: CLEAR
CO2 SERPL-SCNC: 25 MMOL/L (ref 23–29)
COLOR UR: YELLOW
CREAT SERPL-MCNC: 1 MG/DL (ref 0.5–1.4)
CRP SERPL-MCNC: 30 MG/L (ref 0–8.2)
DIFFERENTIAL METHOD: ABNORMAL
EOSINOPHIL # BLD AUTO: 0 K/UL (ref 0–0.5)
EOSINOPHIL NFR BLD: 0.6 % (ref 0–8)
ERYTHROCYTE [DISTWIDTH] IN BLOOD BY AUTOMATED COUNT: 12.3 % (ref 11.5–14.5)
EST. GFR  (AFRICAN AMERICAN): >60 ML/MIN/1.73 M^2
EST. GFR  (NON AFRICAN AMERICAN): 56 ML/MIN/1.73 M^2
GLUCOSE SERPL-MCNC: 120 MG/DL (ref 70–110)
GLUCOSE UR QL STRIP: NEGATIVE
HCT VFR BLD AUTO: 35.8 % (ref 37–48.5)
HGB BLD-MCNC: 11.9 G/DL (ref 12–16)
HGB UR QL STRIP: NEGATIVE
IMM GRANULOCYTES # BLD AUTO: 0.02 K/UL (ref 0–0.04)
IMM GRANULOCYTES NFR BLD AUTO: 0.3 % (ref 0–0.5)
KETONES UR QL STRIP: NEGATIVE
LACTATE SERPL-SCNC: 1 MMOL/L (ref 0.5–2.2)
LDH SERPL L TO P-CCNC: 311 U/L (ref 110–260)
LEUKOCYTE ESTERASE UR QL STRIP: ABNORMAL
LYMPHOCYTES # BLD AUTO: 0.8 K/UL (ref 1–4.8)
LYMPHOCYTES NFR BLD: 11.7 % (ref 18–48)
MCH RBC QN AUTO: 30.1 PG (ref 27–31)
MCHC RBC AUTO-ENTMCNC: 33.2 G/DL (ref 32–36)
MCV RBC AUTO: 91 FL (ref 82–98)
MICROSCOPIC COMMENT: ABNORMAL
MONOCYTES # BLD AUTO: 0.5 K/UL (ref 0.3–1)
MONOCYTES NFR BLD: 7.4 % (ref 4–15)
NEUTROPHILS # BLD AUTO: 5.7 K/UL (ref 1.8–7.7)
NEUTROPHILS NFR BLD: 79.9 % (ref 38–73)
NITRITE UR QL STRIP: POSITIVE
NRBC BLD-RTO: 0 /100 WBC
PH UR STRIP: 6 [PH] (ref 5–8)
PLATELET # BLD AUTO: 175 K/UL (ref 150–450)
PMV BLD AUTO: 9.7 FL (ref 9.2–12.9)
POTASSIUM SERPL-SCNC: 4.6 MMOL/L (ref 3.5–5.1)
PROT SERPL-MCNC: 6.7 G/DL (ref 6–8.4)
PROT UR QL STRIP: NEGATIVE
RBC # BLD AUTO: 3.95 M/UL (ref 4–5.4)
SODIUM SERPL-SCNC: 141 MMOL/L (ref 136–145)
SP GR UR STRIP: 1.01 (ref 1–1.03)
TROPONIN I SERPL DL<=0.01 NG/ML-MCNC: 0.01 NG/ML (ref 0–0.03)
URN SPEC COLLECT METH UR: ABNORMAL
UROBILINOGEN UR STRIP-ACNC: NEGATIVE EU/DL
WBC # BLD AUTO: 7.16 K/UL (ref 3.9–12.7)
WBC #/AREA URNS HPF: 3 /HPF (ref 0–5)

## 2022-01-06 PROCEDURE — 80053 COMPREHEN METABOLIC PANEL: CPT | Performed by: EMERGENCY MEDICINE

## 2022-01-06 PROCEDURE — 25000003 PHARM REV CODE 250: Performed by: INTERNAL MEDICINE

## 2022-01-06 PROCEDURE — 84484 ASSAY OF TROPONIN QUANT: CPT | Performed by: EMERGENCY MEDICINE

## 2022-01-06 PROCEDURE — 96365 THER/PROPH/DIAG IV INF INIT: CPT

## 2022-01-06 PROCEDURE — 85025 COMPLETE CBC W/AUTO DIFF WBC: CPT | Performed by: EMERGENCY MEDICINE

## 2022-01-06 PROCEDURE — 86140 C-REACTIVE PROTEIN: CPT | Performed by: EMERGENCY MEDICINE

## 2022-01-06 PROCEDURE — 63600175 PHARM REV CODE 636 W HCPCS: Performed by: INTERNAL MEDICINE

## 2022-01-06 PROCEDURE — 99285 EMERGENCY DEPT VISIT HI MDM: CPT | Mod: 25

## 2022-01-06 PROCEDURE — 83605 ASSAY OF LACTIC ACID: CPT | Performed by: EMERGENCY MEDICINE

## 2022-01-06 PROCEDURE — 63600175 PHARM REV CODE 636 W HCPCS: Performed by: EMERGENCY MEDICINE

## 2022-01-06 PROCEDURE — 81000 URINALYSIS NONAUTO W/SCOPE: CPT | Performed by: EMERGENCY MEDICINE

## 2022-01-06 PROCEDURE — 82550 ASSAY OF CK (CPK): CPT | Performed by: EMERGENCY MEDICINE

## 2022-01-06 PROCEDURE — M0243 CASIRIVI AND IMDEVI INFUSION: HCPCS | Performed by: INTERNAL MEDICINE

## 2022-01-06 PROCEDURE — 83615 LACTATE (LD) (LDH) ENZYME: CPT | Performed by: EMERGENCY MEDICINE

## 2022-01-06 RX ORDER — ONDANSETRON 4 MG/1
4 TABLET, ORALLY DISINTEGRATING ORAL ONCE AS NEEDED
Status: DISCONTINUED | OUTPATIENT
Start: 2022-01-06 | End: 2022-01-06 | Stop reason: HOSPADM

## 2022-01-06 RX ORDER — ACETAMINOPHEN 325 MG/1
650 TABLET ORAL ONCE AS NEEDED
Status: DISCONTINUED | OUTPATIENT
Start: 2022-01-06 | End: 2022-01-06 | Stop reason: HOSPADM

## 2022-01-06 RX ORDER — CHOLECALCIFEROL (VITAMIN D3) 125 MCG
5000 CAPSULE ORAL DAILY
COMMUNITY

## 2022-01-06 RX ORDER — EPINEPHRINE 0.3 MG/.3ML
0.3 INJECTION SUBCUTANEOUS
Status: DISCONTINUED | OUTPATIENT
Start: 2022-01-06 | End: 2022-01-06 | Stop reason: HOSPADM

## 2022-01-06 RX ORDER — NITROFURANTOIN 25; 75 MG/1; MG/1
100 CAPSULE ORAL 2 TIMES DAILY
Qty: 10 CAPSULE | Refills: 0 | Status: SHIPPED | OUTPATIENT
Start: 2022-01-06 | End: 2022-01-11

## 2022-01-06 RX ORDER — ALBUTEROL SULFATE 90 UG/1
2 AEROSOL, METERED RESPIRATORY (INHALATION)
Status: DISCONTINUED | OUTPATIENT
Start: 2022-01-06 | End: 2022-01-06 | Stop reason: HOSPADM

## 2022-01-06 RX ORDER — SODIUM CHLORIDE 0.9 % (FLUSH) 0.9 %
10 SYRINGE (ML) INJECTION
Status: DISCONTINUED | OUTPATIENT
Start: 2022-01-06 | End: 2022-01-06 | Stop reason: HOSPADM

## 2022-01-06 RX ORDER — DIPHENHYDRAMINE HYDROCHLORIDE 50 MG/ML
25 INJECTION INTRAMUSCULAR; INTRAVENOUS ONCE AS NEEDED
Status: DISCONTINUED | OUTPATIENT
Start: 2022-01-06 | End: 2022-01-06 | Stop reason: HOSPADM

## 2022-01-06 RX ADMIN — CASIRIVIMAB AND IMDEVIMAB 600 MG: 600; 600 INJECTION, SOLUTION, CONCENTRATE INTRAVENOUS at 08:01

## 2022-01-06 RX ADMIN — CEFTRIAXONE 1 G: 1 INJECTION, SOLUTION INTRAVENOUS at 07:01

## 2022-01-06 NOTE — ED PROVIDER NOTES
SCRIBE #1 NOTE: I, Kira Arnett, am scribing for, and in the presence of, Radu Travis MD. I have scribed the HPI, ROS, and PEx.      SCRIBE #2 NOTE: I, Darian Duffy, am scribing for, and in the presence of,  Sandie Goddard MD. I have scribed the remaining portions of the note not scribed by Scribe #1.      History     Chief Complaint   Patient presents with    Back Pain     Pt woke up and went to bathroom and started having back pain,  denies trauma     Review of patient's allergies indicates:   Allergen Reactions    Morphine Hives    Donepezil Nausea And Vomiting         History of Present Illness     HPI    1/6/2022, 3:55 AM  History obtained from the daughter   HPI/ROS limited due to pt with dementia      History of Present Illness: Mari Mayorga is a 71 y.o. female patient with a PMHx of CAD, dementia, CVA, HLD, HTN, parkinson disease, and type 2 DM who presents to the Emergency Department for evaluation after a fall pta. Daughter states she went to check on pt and found her on the floor next to the bed. Pt does not remember what happened. Daughter reports that pt tested + for COVID on 12/30 and has been very weak since. Symptoms are constant and moderate in severity. No mitigating or exacerbating factors reported. Pt only c/o back pain. Daughter also reports pt had an episode of bowel incontinence yesterday. No further complaints or concerns at this time.       Arrival mode: EMS    PCP: Rob Sauceda MD        Past Medical History:  Past Medical History:   Diagnosis Date    CAD (coronary artery disease)     s/p stent 2016    Chronic constipation     Dementia     GERD (gastroesophageal reflux disease)     History of breast cancer     bilateral breast, XRT both times, no ctx    History of CVA (cerebrovascular accident)     Hyperlipidemia associated with type 2 diabetes mellitus     Hypertension associated with diabetes     Major depression, recurrent     Parkinson disease     Type II  diabetes mellitus with complication        Past Surgical History:  Past Surgical History:   Procedure Laterality Date    ADENOIDECTOMY      BACK SURGERY      BREAST LUMPECTOMY      CHOLECYSTECTOMY      HYSTERECTOMY      TONSILLECTOMY           Family History:  Family History   Problem Relation Age of Onset    Diabetes Mother        Social History:  Social History     Tobacco Use    Smoking status: Former Smoker    Smokeless tobacco: Never Used   Substance and Sexual Activity    Alcohol use: No    Drug use: No    Sexual activity: Never        Review of Systems     Review of Systems   Unable to perform ROS: Dementia   Musculoskeletal: Positive for back pain.   Neurological: Positive for weakness (generalized).        + bowel incontinence      Physical Exam     Initial Vitals [01/06/22 0320]   BP Pulse Resp Temp SpO2   115/66 74 16 98.4 °F (36.9 °C) 98 %      MAP       --          Physical Exam  Nursing Notes and Vital Signs Reviewed.  Constitutional: Patient is in no acute distress. Well-developed and well-nourished.  Head: Atraumatic. Normocephalic.  Eyes: PERRL. EOM intact. Conjunctivae are not pale. No scleral icterus.  ENT: Mucous membranes are moist. Oropharynx is clear and symmetric.    Neck: Supple. Full ROM. No lymphadenopathy.  Cardiovascular: Regular rate. Regular rhythm. No murmurs, rubs, or gallops. Distal pulses are 2+ and symmetric.  Pulmonary/Chest: No respiratory distress. Clear to auscultation bilaterally. No wheezing or rales.  Abdominal: Soft and non-distended.  There is no tenderness.  No rebound, guarding, or rigidity. Good bowel sounds.  Genitourinary: No CVA tenderness  Musculoskeletal: Moves all extremities. No obvious deformities. No edema. No calf tenderness.  Skin: Warm and dry.  Neurological:  Alert, awake, and appropriate.  Normal speech.  No acute focal neurological deficits are appreciated.  Psychiatric: Normal affect. Good eye contact. Appropriate in content.     ED Course  "  Procedures  ED Vital Signs:  Vitals:    01/06/22 0320 01/06/22 0510 01/06/22 0530 01/06/22 0600   BP: 115/66 (!) 144/64 (!) 164/66 (!) 150/67   Pulse: 74 84 88 87   Resp: 16 16 18 15   Temp: 98.4 °F (36.9 °C)      TempSrc: Oral      SpO2: 98% 97% 98% 97%   Weight: 74.4 kg (164 lb)      Height: 5' 2" (1.575 m)       01/06/22 0630 01/06/22 0700 01/06/22 0730   BP: (!) 179/72 (!) 152/68 (!) 150/83   Pulse: 99 92 86   Resp: 19 12 15   Temp:      TempSrc:      SpO2: 97% 96% 97%   Weight:      Height:          Abnormal Lab Results:  Labs Reviewed   CBC W/ AUTO DIFFERENTIAL - Abnormal; Notable for the following components:       Result Value    RBC 3.95 (*)     Hemoglobin 11.9 (*)     Hematocrit 35.8 (*)     Lymph # 0.8 (*)     Gran % 79.9 (*)     Lymph % 11.7 (*)     All other components within normal limits   COMPREHENSIVE METABOLIC PANEL - Abnormal; Notable for the following components:    Glucose 120 (*)     Albumin 3.3 (*)     eGFR if non  56 (*)     All other components within normal limits   URINALYSIS, REFLEX TO URINE CULTURE - Abnormal; Notable for the following components:    Nitrite, UA Positive (*)     Leukocytes, UA 2+ (*)     All other components within normal limits    Narrative:     Specimen Source->Urine   C-REACTIVE PROTEIN - Abnormal; Notable for the following components:    CRP 30.0 (*)     All other components within normal limits   LACTATE DEHYDROGENASE - Abnormal; Notable for the following components:     (*)     All other components within normal limits   URINALYSIS MICROSCOPIC - Abnormal; Notable for the following components:    Bacteria Moderate (*)     All other components within normal limits    Narrative:     Specimen Source->Urine   CK   LACTIC ACID, PLASMA   TROPONIN I        All Lab Results:  Results for orders placed or performed during the hospital encounter of 01/06/22   CBC auto differential   Result Value Ref Range    WBC 7.16 3.90 - 12.70 K/uL    RBC 3.95 (L) " 4.00 - 5.40 M/uL    Hemoglobin 11.9 (L) 12.0 - 16.0 g/dL    Hematocrit 35.8 (L) 37.0 - 48.5 %    MCV 91 82 - 98 fL    MCH 30.1 27.0 - 31.0 pg    MCHC 33.2 32.0 - 36.0 g/dL    RDW 12.3 11.5 - 14.5 %    Platelets 175 150 - 450 K/uL    MPV 9.7 9.2 - 12.9 fL    Immature Granulocytes 0.3 0.0 - 0.5 %    Gran # (ANC) 5.7 1.8 - 7.7 K/uL    Immature Grans (Abs) 0.02 0.00 - 0.04 K/uL    Lymph # 0.8 (L) 1.0 - 4.8 K/uL    Mono # 0.5 0.3 - 1.0 K/uL    Eos # 0.0 0.0 - 0.5 K/uL    Baso # 0.01 0.00 - 0.20 K/uL    nRBC 0 0 /100 WBC    Gran % 79.9 (H) 38.0 - 73.0 %    Lymph % 11.7 (L) 18.0 - 48.0 %    Mono % 7.4 4.0 - 15.0 %    Eosinophil % 0.6 0.0 - 8.0 %    Basophil % 0.1 0.0 - 1.9 %    Differential Method Automated    Comprehensive metabolic panel   Result Value Ref Range    Sodium 141 136 - 145 mmol/L    Potassium 4.6 3.5 - 5.1 mmol/L    Chloride 105 95 - 110 mmol/L    CO2 25 23 - 29 mmol/L    Glucose 120 (H) 70 - 110 mg/dL    BUN 17 8 - 23 mg/dL    Creatinine 1.0 0.5 - 1.4 mg/dL    Calcium 8.9 8.7 - 10.5 mg/dL    Total Protein 6.7 6.0 - 8.4 g/dL    Albumin 3.3 (L) 3.5 - 5.2 g/dL    Total Bilirubin 0.6 0.1 - 1.0 mg/dL    Alkaline Phosphatase 60 55 - 135 U/L    AST 29 10 - 40 U/L    ALT 14 10 - 44 U/L    Anion Gap 11 8 - 16 mmol/L    eGFR if African American >60 >60 mL/min/1.73 m^2    eGFR if non African American 56 (A) >60 mL/min/1.73 m^2   CPK   Result Value Ref Range     20 - 180 U/L   Lactic acid, plasma   Result Value Ref Range    Lactate (Lactic Acid) 1.0 0.5 - 2.2 mmol/L   Troponin I   Result Value Ref Range    Troponin I 0.007 0.000 - 0.026 ng/mL   Urinalysis, Reflex to Urine Culture Urine, Clean Catch    Specimen: Urine   Result Value Ref Range    Specimen UA Urine, Clean Catch     Color, UA Yellow Yellow, Straw, Darleen    Appearance, UA Clear Clear    pH, UA 6.0 5.0 - 8.0    Specific Gravity, UA 1.010 1.005 - 1.030    Protein, UA Negative Negative    Glucose, UA Negative Negative    Ketones, UA Negative Negative     Bilirubin (UA) Negative Negative    Occult Blood UA Negative Negative    Nitrite, UA Positive (A) Negative    Urobilinogen, UA Negative <2.0 EU/dL    Leukocytes, UA 2+ (A) Negative   C-reactive protein   Result Value Ref Range    CRP 30.0 (H) 0.0 - 8.2 mg/L   Lactate dehydrogenase   Result Value Ref Range     (H) 110 - 260 U/L   Urinalysis Microscopic   Result Value Ref Range    WBC, UA 3 0 - 5 /hpf    Bacteria Moderate (A) None-Occ /hpf    Microscopic Comment SEE COMMENT        Imaging Results:  Imaging Results          X-Ray Chest AP Portable (Final result)  Result time 01/06/22 07:23:12    Final result by Edmund Finch MD (01/06/22 07:23:12)                 Impression:      Faint patchy bilateral ground-glass as can be seen with mild-to-moderate COVID-19 pneumonitis.      Electronically signed by: Edmund Finch  Date:    01/06/2022  Time:    07:23             Narrative:    EXAMINATION:  XR CHEST AP PORTABLE    CLINICAL HISTORY:  . Weakness    TECHNIQUE:  Single frontal portable view of the chest was performed.    COMPARISON:  03/12/2018    FINDINGS:  Faint patchy bilateral ground-glass as can be seen with mild-to-moderate COVID-19 pneumonitis.    Bones are intact.                               X-Ray Lumbar Spine 5 View (Final result)  Result time 01/06/22 07:24:48    Final result by Edmund Finch MD (01/06/22 07:24:48)                 Impression:      No acute abnormality.      Electronically signed by: Edmund Finch  Date:    01/06/2022  Time:    07:24             Narrative:    EXAMINATION:  XR LUMBAR SPINE COMPLETE 5 VIEW    CLINICAL HISTORY:  Back pain    TECHNIQUE:  Five views of the lumbar spine were performed.    COMPARISON:  None    FINDINGS:  Alignment: Alignment is maintained.    Vertebrae: Vertebral body heights are maintained.  No suspicious appearing lytic or blastic lesions.    Discs and facets: Moderate severity multilevel discogenic degenerative change of the lumbar spine. Moderate lower  lumbar facet arthropathy.    Miscellaneous: Moderate stool in the right colon.  Moderate gas in the transverse colon.  Cholecystectomy clips.  Advanced atherosclerotic calcification.                               5:19 AM: Per ED physician, pt's CXR results: peripheral infiltrates.    6:01 AM: Per ED physician, pt's XR L-spine results: no fractures.      The EKG was ordered, reviewed, and independently interpreted by the ED provider.  Interpretation time: 0409  Rate: 84 BPM  Rhythm: normal sinus rhythm  Interpretation: Low voltage QRS. Cannot rule out anterior infarct, age undetermined. No STEMI.           The Emergency Provider reviewed the vital signs and test results, which are outlined above.     ED Discussion     6:01 AM: Dr. Travis transfers care of patient to Dr. Goddard pending lab results.    7:42 AM: Reassessed pt at this time. Discussed with pt all pertinent ED information and results. Discussed pt dx and plan of tx. Gave pt all f/u and return to the ED instructions. All questions and concerns were addressed at this time. Pt expresses understanding of information and instructions, and is comfortable with plan to discharge. Pt is stable for discharge.    I discussed with patient and/or family/caretaker that evaluation in the ED does not suggest any emergent or life threatening medical conditions requiring immediate intervention beyond what was provided in the ED, and I believe patient is safe for discharge.  Regardless, an unremarkable evaluation in the ED does not preclude the development or presence of a serious of life threatening condition. As such, patient was instructed to return immediately for any worsening or change in current symptoms.       Medical Decision Making:   Clinical Tests:   Lab Tests: Ordered and Reviewed  Radiological Study: Ordered and Reviewed  Medical Tests: Ordered and Reviewed           ED Medication(s):  Medications   cefTRIAXone (ROCEPHIN) 1 g/50 mL D5W IVPB (0 g Intravenous  Stopped 1/6/22 0801)       New Prescriptions    NITROFURANTOIN, MACROCRYSTAL-MONOHYDRATE, (MACROBID) 100 MG CAPSULE    Take 1 capsule (100 mg total) by mouth 2 (two) times daily. for 5 days        Follow-up Information     Rbo Sauceda MD. Schedule an appointment as soon as possible for a visit in 2 days.    Specialty: Internal Medicine  Why: Return to the Emergency Room, If symptoms worsen  Contact information:  [address]   [address]  [facility] [address]  [phone]                             Scribe Attestation:   Scribe #1: I performed the above scribed service and the documentation accurately describes the services I performed. I attest to the accuracy of the note.     Attending:   Physician Attestation Statement for Scribe #1: I, Radu Travis MD, personally performed the services described in this documentation, as scribed by Kira Arnett, in my presence, and it is both accurate and complete.       Scribe Attestation:   Scribe #2: I performed the above scribed service and the documentation accurately describes the services I performed. I attest to the accuracy of the note.    Attending Attestation:           Physician Attestation for Scribe:    Physician Attestation Statement for Scribe #2: I, Sandie Goddard MD, reviewed documentation, as scribed by Darian Duffy in my presence, and it is both accurate and complete. I also acknowledge and confirm the content of the note done by Scribe #1.           Clinical Impression       ICD-10-CM ICD-9-CM   1. Acute cystitis without hematuria  N30.00 595.0   2. Weakness  R53.1 780.79   3. Parkinsons  G20 332.0   4. COVID  U07.1 079.89       Disposition:   Disposition: Discharged  Condition: Stable         Sandie Goddard MD  01/06/22 0858

## 2022-01-06 NOTE — ED NOTES
Spoke with Valeria with [facility] and with pt's daughter Katherin. Pt will receive infusion here and daughter will pick pt up around 1300.

## 2022-01-06 NOTE — TELEPHONE ENCOUNTER
----- Message from Joselin William sent at 1/6/2022  2:58 PM CST -----  Contact: Katherin Archer-daughter  Calling in regards to pt testing positive for covid on 01/01/2022 and she has . Pt has an apt scheduled on tomorrow and she pneumonia. Pt has had a covid infusion. Daughter would like to know if she can still come in for the apt on tomorrow. Daughter would like a call back today Please call [phone]

## 2022-01-06 NOTE — ED NOTES
Pt tested p[ositive for COVID on Dec. 30th. Pt daughter requests for pt to receive antibody infusion.

## 2022-01-07 ENCOUNTER — OFFICE VISIT (OUTPATIENT)
Dept: INTERNAL MEDICINE | Facility: CLINIC | Age: 72
End: 2022-01-07
Payer: MEDICARE

## 2022-01-07 DIAGNOSIS — F02.80 LATE ONSET ALZHEIMER'S DEMENTIA WITHOUT BEHAVIORAL DISTURBANCE: ICD-10-CM

## 2022-01-07 DIAGNOSIS — Z91.81 RISK FOR FALLS: Primary | ICD-10-CM

## 2022-01-07 DIAGNOSIS — G30.1 LATE ONSET ALZHEIMER'S DEMENTIA WITHOUT BEHAVIORAL DISTURBANCE: ICD-10-CM

## 2022-01-07 DIAGNOSIS — G20.A1 PARKINSON DISEASE: ICD-10-CM

## 2022-01-07 PROCEDURE — 99213 PR OFFICE/OUTPT VISIT, EST, LEVL III, 20-29 MIN: ICD-10-PCS | Mod: 95,,, | Performed by: INTERNAL MEDICINE

## 2022-01-07 PROCEDURE — 99213 OFFICE O/P EST LOW 20 MIN: CPT | Mod: 95,,, | Performed by: INTERNAL MEDICINE

## 2022-01-07 NOTE — PROGRESS NOTES
The patient location is: home  The chief complaint leading to consultation is: ER f/u  Visit type: Virtual visit with synchronous audio and video  Total time spent with patient: 10 min  Each patient to whom he or she provides medical services by telemedicine is:  (1) informed of the relationship between the physician and patient and the respective role of any other health care provider with respect to management of the patient; and (2) notified that he or she may decline to receive medical services by telemedicine and may withdraw from such care at any time.    HPI:  Patient is 71-year-old female who is seen via telemedicine accompanied by her daughter after an ER visit.  The daughter states that the patient was found to be on the floor of her bedroom by the bed in the early morning hours.  The patient has dementia and the family has a monitor in the bedroom and the daughter just happened to see on the monitor that she was on the floor.  No one saw how she got to the floor.  Patient was complaining of back pain and so the ER had the am was coming bring into the emergency room.  In the ER all her examination was unremarkable except for a bladder infection.  The daughter states she has had some confusion lately.  Patient had always is confused due to her dementia but the daughter states he has been more so.  She had a similar episode a year to go with the urinary tract infection causing more confusion.  The daughter is requesting home health services to come out and evaluate will other things that may be of assistance in the home such as PT and OT and any durable medical equipment that might be helpful.  Current meds have been verified and updated per the EMR  Exam:  The patient looks very comfortable.  No signs of distress.    Lab Results   Component Value Date    WBC 7.16 01/06/2022    HGB 11.9 (L) 01/06/2022    HCT 35.8 (L) 01/06/2022     01/06/2022    CHOL 177 10/06/2021    TRIG 138 10/06/2021    HDL 51  10/06/2021    ALT 14 01/06/2022    AST 29 01/06/2022     01/06/2022    K 4.6 01/06/2022     01/06/2022    CREATININE 1.0 01/06/2022    BUN 17 01/06/2022    CO2 25 01/06/2022    TSH 4.089 (H) 10/06/2021    INR 1.1 03/06/2020    HGBA1C 7.4 (H) 10/06/2021       Impression:  At high risk of falls  Chronic dementia  Patient Active Problem List   Diagnosis    Type II diabetes mellitus with complication    Hypertension associated with diabetes    Hyperlipidemia associated with type 2 diabetes mellitus    Parkinson disease    CAD (coronary artery disease)    GERD (gastroesophageal reflux disease)    Chronic constipation    History of breast cancer    History of CVA (cerebrovascular accident)    Major depression, recurrent    Dementia    Aortic atherosclerosis    Risk for falls       Plan:  Orders Placed This Encounter    Ambulatory referral/consult to Home Health     Home health services will be arranged.    This note is generated with speech recognition software and is subject to transcription error and sound alike phrases that may be missed by proofreading.

## 2022-01-10 PROCEDURE — G0180 PR HOME HEALTH MD CERTIFICATION: ICD-10-PCS | Mod: ,,, | Performed by: INTERNAL MEDICINE

## 2022-01-10 PROCEDURE — G0180 MD CERTIFICATION HHA PATIENT: HCPCS | Mod: ,,, | Performed by: INTERNAL MEDICINE

## 2022-01-19 ENCOUNTER — TELEPHONE (OUTPATIENT)
Dept: INTERNAL MEDICINE | Facility: CLINIC | Age: 72
End: 2022-01-19

## 2022-01-19 ENCOUNTER — OFFICE VISIT (OUTPATIENT)
Dept: INTERNAL MEDICINE | Facility: CLINIC | Age: 72
End: 2022-01-19
Payer: MEDICARE

## 2022-01-19 DIAGNOSIS — F02.80 LATE ONSET ALZHEIMER'S DEMENTIA WITHOUT BEHAVIORAL DISTURBANCE: Primary | ICD-10-CM

## 2022-01-19 DIAGNOSIS — I15.2 HYPERTENSION ASSOCIATED WITH DIABETES: ICD-10-CM

## 2022-01-19 DIAGNOSIS — E11.59 HYPERTENSION ASSOCIATED WITH DIABETES: ICD-10-CM

## 2022-01-19 DIAGNOSIS — G30.1 LATE ONSET ALZHEIMER'S DEMENTIA WITHOUT BEHAVIORAL DISTURBANCE: Primary | ICD-10-CM

## 2022-01-19 PROCEDURE — 99213 OFFICE O/P EST LOW 20 MIN: CPT | Mod: 95,,, | Performed by: INTERNAL MEDICINE

## 2022-01-19 PROCEDURE — 99213 PR OFFICE/OUTPT VISIT, EST, LEVL III, 20-29 MIN: ICD-10-PCS | Mod: 95,,, | Performed by: INTERNAL MEDICINE

## 2022-01-19 NOTE — TELEPHONE ENCOUNTER
Please call [facility] home health and request that they send aids out twice a week for snf, personal needs care such as bathing

## 2022-01-19 NOTE — PROGRESS NOTES
The patient location is: home  The chief complaint leading to consultation is: NH placement  Visit type: Virtual visit with synchronous audio and video  Total time spent with patient: 10 min  Each patient to whom he or she provides medical services by telemedicine is:  (1) informed of the relationship between the physician and patient and the respective role of any other health care provider with respect to management of the patient; and (2) notified that he or she may decline to receive medical services by telemedicine and may withdraw from such care at any time.    HPI:  Patient is a 71-year-old female with Alzheimer's disease.  Today I talked with the daughter extensively about nursing home placement.  The daughter is to the point that she cannot provide for her physical care at home.  She is becoming more more disabled due to her Alzheimer's disease.  We had a long discussion about how to approach finding a nursing home and getting placed.    Current meds have been verified and updated per the EMR  Exam:  Patient looks comfortable.  No signs of distress    Lab Results   Component Value Date    WBC 7.16 01/06/2022    HGB 11.9 (L) 01/06/2022    HCT 35.8 (L) 01/06/2022     01/06/2022    CHOL 177 10/06/2021    TRIG 138 10/06/2021    HDL 51 10/06/2021    ALT 14 01/06/2022    AST 29 01/06/2022     01/06/2022    K 4.6 01/06/2022     01/06/2022    CREATININE 1.0 01/06/2022    BUN 17 01/06/2022    CO2 25 01/06/2022    TSH 4.089 (H) 10/06/2021    INR 1.1 03/06/2020    HGBA1C 7.4 (H) 10/06/2021       Impression:  End-stage Alzheimer's disease  Patient Active Problem List   Diagnosis    Type II diabetes mellitus with complication    Hypertension associated with diabetes    Hyperlipidemia associated with type 2 diabetes mellitus    Parkinson disease    CAD (coronary artery disease)    GERD (gastroesophageal reflux disease)    Chronic constipation    History of breast cancer    History of CVA  (cerebrovascular accident)    Major depression, recurrent    Dementia    Aortic atherosclerosis    Risk for falls       Plan:     The daughter will look in to several different options for nursing home care    This note is generated with speech recognition software and is subject to transcription error and sound alike phrases that may be missed by proofreading.

## 2022-01-19 NOTE — TELEPHONE ENCOUNTER
Called [facility] spoke to Lis provided verbal order for aid twice a week for [facility], personal needs care such as bathing

## 2022-01-21 ENCOUNTER — PES CALL (OUTPATIENT)
Dept: ADMINISTRATIVE | Facility: CLINIC | Age: 72
End: 2022-01-21
Payer: MEDICARE

## 2022-02-01 ENCOUNTER — DOCUMENT SCAN (OUTPATIENT)
Dept: HOME HEALTH SERVICES | Facility: HOSPITAL | Age: 72
End: 2022-02-01
Payer: MEDICARE

## 2022-02-02 ENCOUNTER — EXTERNAL HOME HEALTH (OUTPATIENT)
Dept: HOME HEALTH SERVICES | Facility: HOSPITAL | Age: 72
End: 2022-02-02
Payer: MEDICARE

## 2022-02-03 ENCOUNTER — DOCUMENT SCAN (OUTPATIENT)
Dept: HOME HEALTH SERVICES | Facility: HOSPITAL | Age: 72
End: 2022-02-03
Payer: MEDICARE

## 2022-02-07 ENCOUNTER — DOCUMENT SCAN (OUTPATIENT)
Dept: HOME HEALTH SERVICES | Facility: HOSPITAL | Age: 72
End: 2022-02-07
Payer: MEDICARE

## 2022-02-09 ENCOUNTER — DOCUMENT SCAN (OUTPATIENT)
Dept: HOME HEALTH SERVICES | Facility: HOSPITAL | Age: 72
End: 2022-02-09
Payer: MEDICARE

## 2022-02-17 ENCOUNTER — OFFICE VISIT (OUTPATIENT)
Dept: HOME HEALTH SERVICES | Facility: CLINIC | Age: 72
End: 2022-02-17
Payer: MEDICARE

## 2022-02-17 VITALS
SYSTOLIC BLOOD PRESSURE: 126 MMHG | WEIGHT: 150 LBS | DIASTOLIC BLOOD PRESSURE: 64 MMHG | HEART RATE: 81 BPM | BODY MASS INDEX: 27.6 KG/M2 | OXYGEN SATURATION: 99 % | HEIGHT: 62 IN | TEMPERATURE: 99 F

## 2022-02-17 DIAGNOSIS — F02.80 LATE ONSET ALZHEIMER'S DEMENTIA WITHOUT BEHAVIORAL DISTURBANCE: ICD-10-CM

## 2022-02-17 DIAGNOSIS — G20.A1 PARKINSON DISEASE: ICD-10-CM

## 2022-02-17 DIAGNOSIS — I15.2 HYPERTENSION ASSOCIATED WITH DIABETES: ICD-10-CM

## 2022-02-17 DIAGNOSIS — G30.1 LATE ONSET ALZHEIMER'S DEMENTIA WITHOUT BEHAVIORAL DISTURBANCE: ICD-10-CM

## 2022-02-17 DIAGNOSIS — E11.69 HYPERLIPIDEMIA ASSOCIATED WITH TYPE 2 DIABETES MELLITUS: ICD-10-CM

## 2022-02-17 DIAGNOSIS — Z00.00 ENCOUNTER FOR PREVENTIVE HEALTH EXAMINATION: Primary | ICD-10-CM

## 2022-02-17 DIAGNOSIS — I70.0 AORTIC ATHEROSCLEROSIS: ICD-10-CM

## 2022-02-17 DIAGNOSIS — F33.1 MODERATE EPISODE OF RECURRENT MAJOR DEPRESSIVE DISORDER: ICD-10-CM

## 2022-02-17 DIAGNOSIS — E78.5 HYPERLIPIDEMIA ASSOCIATED WITH TYPE 2 DIABETES MELLITUS: ICD-10-CM

## 2022-02-17 DIAGNOSIS — Z91.81 RISK FOR FALLS: ICD-10-CM

## 2022-02-17 DIAGNOSIS — K59.09 CHRONIC CONSTIPATION: ICD-10-CM

## 2022-02-17 DIAGNOSIS — K21.9 GASTROESOPHAGEAL REFLUX DISEASE, UNSPECIFIED WHETHER ESOPHAGITIS PRESENT: ICD-10-CM

## 2022-02-17 DIAGNOSIS — I25.10 CORONARY ARTERY DISEASE, UNSPECIFIED VESSEL OR LESION TYPE, UNSPECIFIED WHETHER ANGINA PRESENT, UNSPECIFIED WHETHER NATIVE OR TRANSPLANTED HEART: ICD-10-CM

## 2022-02-17 DIAGNOSIS — Z85.3 HISTORY OF BREAST CANCER: ICD-10-CM

## 2022-02-17 DIAGNOSIS — E11.59 HYPERTENSION ASSOCIATED WITH DIABETES: ICD-10-CM

## 2022-02-17 DIAGNOSIS — R63.4 WEIGHT LOSS, ABNORMAL: ICD-10-CM

## 2022-02-17 DIAGNOSIS — R26.9 ABNORMALITY OF GAIT AND MOBILITY: ICD-10-CM

## 2022-02-17 DIAGNOSIS — E11.8 TYPE II DIABETES MELLITUS WITH COMPLICATION: ICD-10-CM

## 2022-02-17 PROCEDURE — G0439 PR MEDICARE ANNUAL WELLNESS SUBSEQUENT VISIT: ICD-10-PCS | Mod: S$GLB,,, | Performed by: NURSE PRACTITIONER

## 2022-02-17 PROCEDURE — G0439 PPPS, SUBSEQ VISIT: HCPCS | Mod: S$GLB,,, | Performed by: NURSE PRACTITIONER

## 2022-02-17 NOTE — PROGRESS NOTES
"Mari Mayorga presented for Medicare AWV today. The following components were reviewed and updated:    · Medical history  · Family History  · Social history  · Allergies and Current Medications  · Health Risk Assessment  · Health Maintenance  · Care Team    **See Completed Assessments for Annual Wellness visit with in the encounter summary    The following assessments were completed:  · Depression Screening  · Cognitive function Screening        · Timed Get Up Test  · Whisper Test    Vitals:    02/17/22 1024   BP: 126/64   Pulse: 81   Temp: 99 °F (37.2 °C)   TempSrc: Temporal   SpO2: 99%   Weight: 68 kg (150 lb)   Height: 5' 2" (1.575 m)     Body mass index is 27.44 kg/m².   ]    Physical Exam  Vitals reviewed.   Constitutional:       Appearance: Normal appearance.   HENT:      Head: Normocephalic and atraumatic.      Nose: Nose normal.      Mouth/Throat:      Mouth: Mucous membranes are moist.      Pharynx: Oropharynx is clear.   Cardiovascular:      Rate and Rhythm: Normal rate and regular rhythm.      Pulses: Normal pulses.           Dorsalis pedis pulses are 2+ on the right side and 2+ on the left side.        Posterior tibial pulses are 2+ on the right side and 2+ on the left side.      Heart sounds: Normal heart sounds.   Pulmonary:      Effort: Pulmonary effort is normal.      Breath sounds: Normal breath sounds.   Musculoskeletal:         General: Normal range of motion.      Cervical back: Normal range of motion and neck supple.      Right foot: Normal range of motion. No deformity, bunion, Charcot foot, foot drop or prominent metatarsal heads.      Left foot: Normal range of motion. No deformity, bunion, Charcot foot, foot drop or prominent metatarsal heads.   Feet:      Right foot:      Protective Sensation: 10 sites tested. 10 sites sensed.      Skin integrity: Skin integrity normal. No ulcer, blister, skin breakdown, erythema, warmth, callus, dry skin or fissure.      Toenail Condition: Right toenails " are abnormally thick. Fungal disease present.     Left foot:      Protective Sensation: 10 sites tested. 9 sites sensed.      Skin integrity: Skin integrity normal. No ulcer, blister, skin breakdown, erythema, warmth, callus, dry skin or fissure.      Toenail Condition: Left toenails are abnormally thick. Fungal disease present.  Skin:     General: Skin is warm and dry.   Neurological:      Mental Status: She is alert and oriented to person, place, and time.      Gait: Gait abnormal.   Psychiatric:         Mood and Affect: Mood normal.         Behavior: Behavior normal.          Outpatient Medications Marked as Taking for the 2/17/22 encounter (Office Visit) with ROMEL Leung   Medication Sig Dispense Refill    AMITIZA 24 mcg Cap TAKE 1 CAPSULE BY MOUTH  DAILY WITH BREAKFAST 90 capsule 1    amLODIPine (NORVASC) 2.5 MG tablet TAKE 1 TABLET BY MOUTH ONCE DAILY 90 tablet 1    aspirin (ECOTRIN) 81 MG EC tablet Take 81 mg by mouth.      blood sugar diagnostic Strp Daily.      blood-glucose meter Misc       buPROPion (WELLBUTRIN XL) 150 MG TB24 tablet Take 1 tablet (150 mg total) by mouth once daily. 90 tablet 3    carbidopa-levodopa  mg (SINEMET)  mg per tablet Take 2 tablets by mouth 4 (four) times daily. Takes 2 1/2 pills by mouth four times daily      cholecalciferol, vitamin D3, 125 mcg (5,000 unit) capsule Take 5,000 Units by mouth once daily.      docusate sodium (COLACE) 250 MG capsule Take 250 mg by mouth once daily.      furosemide (LASIX) 20 MG tablet Take 20 mg by mouth every other day.      glimepiride (AMARYL) 4 MG tablet Take 1 tablet (4 mg total) by mouth once daily. 90 tablet 1    lancets 28 gauge Misc Inject 1 applicator into the skin.      lancing device Misc       lisinopriL (PRINIVIL,ZESTRIL) 40 MG tablet TAKE 1 TABLET BY MOUTH ONCE DAILY 90 tablet 3    memantine (NAMENDA) 10 MG Tab Take 1 tablet (10 mg total) by mouth 2 (two) times daily. 60 tablet 11    metFORMIN  (GLUCOPHAGE) 500 MG tablet Take 1 tablet (500 mg total) by mouth 2 (two) times daily with meals. 180 tablet 3    oxybutynin (DITROPAN XL) 15 MG TR24 Take 1 tablet (15 mg total) by mouth once daily. 90 tablet 3    pantoprazole (PROTONIX) 40 MG tablet Take 1 tablet (40 mg total) by mouth once daily. 90 tablet 3        Diagnoses and health risks identified today and associated recommendations/orders:  1. Encounter for preventive health examination  Medicare aw complete. Health maintenance:  Mammogram scheduled at [facility] in march. covid 19 booster and shingles vaccines due-encouraged patient to obtain. Colonoscopy due-f/u with pcp. High dose statin recommended-defer to pcp.     2. Hypertension associated with diabetes  Chronic and stable on current management. See med list above. Recommend low sodium diet. Follow up with PCP.       3. Hyperlipidemia associated with type 2 diabetes mellitus  Lab Results   Component Value Date    CHOL 177 10/06/2021    CHOL 199 01/27/2021    CHOL 208 (H) 07/22/2020     Lab Results   Component Value Date    HDL 51 10/06/2021    HDL 63 01/27/2021    HDL 66 07/22/2020     Lab Results   Component Value Date    LDLCALC 98.4 10/06/2021    LDLCALC 108.0 01/27/2021    LDLCALC 120.6 07/22/2020     Lab Results   Component Value Date    TRIG 138 10/06/2021    TRIG 140 01/27/2021    TRIG 107 07/22/2020     Lab Results   Component Value Date    CHOLHDL 28.8 10/06/2021    CHOLHDL 31.7 01/27/2021    CHOLHDL 31.7 07/22/2020    Chronic and stable on current management. See med list above. Follow up with PCP.      4. Type II diabetes mellitus with complication   Results for HOSEA SHAILESH MAHAN (MRN [id number]) as of 2/17/2022 13:39   Ref. Range 3/20/2019 11:00 9/19/2019 08:02 7/22/2020 09:00 1/27/2021 08:50 10/6/2021 07:34   Hemoglobin A1C External Latest Ref Range: 4.0 - 5.6 % 7.1 (H) 7.5 (H) 6.0 (H) 6.4 (H) 7.4 (H)   Estimated Avg Glucose Latest Ref Range: 68 - 131 mg/dL 157 (H) 169 (H) 126 137 (H)  166 (H)   Chronic and stable on current management. See med list above. Patient's aid checks her BS daily. Reviewed diabetic diet and preferred BS levels. Follow up with your PCP as instructed. Instructed patient on diabetic foot care. Follow up with podiatry and ophthalmology yearly.      5. Moderate episode of recurrent major depressive disorder  Chronic and stable on current management. See med list above. Follow up with PCP.      6. Aortic atherosclerosis  Chronic and stable on current management. See med list above. Follow up with PCP.      7. Late onset Alzheimer's dementia without behavioral disturbance  Chronic and stable on current management. See med list above. Follow up with PCP.      8. Parkinson disease  Chronic and stable on current management. See med list above. Follow up with PCP.      9. Coronary artery disease, unspecified vessel or lesion type, unspecified whether angina present, unspecified whether native or transplanted heart  Chronic and stable. No acute issues. Continue current management. See med list above. Follow up with cardiology.      10. History of breast cancer  Bilateral lumpectomy 2014 followed by bilateral adjuvant breast radiation. Mammogram scheduled at [facility] in march    11. Gastroesophageal reflux disease, unspecified whether esophagitis present  Chronic and stable on current management. See med list above. Follow up with PCP.      12. Chronic constipation  Chronic and stable on current management. See med list above. Follow up with PCP.      13. Risk for falls  Chronic. Continue HH PT. Fall precautions recommended. Follow up with PCP.      14. Weight loss, abnormal  When patient got covid in dec, she lost about 10 pounds. Daughter states she is eating well now and weight is improving. F/u with pcp.     15. Abnormality of gait and mobility  Chronic. Continue HH PT. Fall precautions recommended. Follow up with PCP.         Provided Mari with a 5-10 year written screening  schedule and personal prevention plan. Recommendations were developed using the USPSTF age appropriate recommendations. Education, counseling, and referrals were provided as needed.  After Visit Summary printed and given to patient which includes a list of additional screenings\tests needed.    Follow up in about 1 year (around 2/17/2023) for annual wellness visit.      ROMEL Leung  I offered to discuss advanced care planning, including how to pick a person who would make decisions for you if you were unable to make them for yourself, called a health care power of , and what kind of decisions you might make such as use of life sustaining treatments such as ventilators and tube feeding when faced with a life limiting illness recorded on a living will that they will need to know. (How you want to be cared for as you near the end of your natural life)     X  Patient is unable to engage in a discussion regarding advanced directives due to severe physical and/or cognitive impairment.

## 2022-02-17 NOTE — Clinical Note
Medicare awv complete. Health maintenance:  Mammogram scheduled at [facility] in march. covid 19 booster and shingles vaccines due-encouraged patient to obtain. Colonoscopy due-f/u with pcp. High dose statin recommended-defer to pcp.

## 2022-02-17 NOTE — PATIENT INSTRUCTIONS
Counseling and Referral of Other Preventative  (Italic type indicates deductible and co-insurance are waived)    Patient Name: Mari Mayorga  Today's Date: 2/17/2022    Health Maintenance       Date Due Completion Date    High Dose Statin Never done ---    Colorectal Cancer Screening Never done ---    Shingles Vaccine (1 of 2) Never done ---    COVID-19 Vaccine (3 - Booster for Pfizer series) 10/04/2021 4/4/2021    Foot Exam 01/27/2022 1/27/2021    Override on 7/28/2020: Done    Override on 3/25/2019: Done    Mammogram 03/12/2022 3/12/2021    Override on 12/20/2016: Done (diagnostic-see care everywhere)    Hemoglobin A1c 04/06/2022 10/6/2021    Eye Exam 08/02/2022 8/2/2021    Lipid Panel 10/06/2022 10/6/2021    Diabetes Urine Screening 12/09/2022 12/9/2021    Aspirin/Antiplatelet Therapy 01/06/2023 1/6/2022    DEXA SCAN 02/09/2024 2/9/2021    Override on 6/18/2016: Done (see care everywhere)    Override on 2/28/2013: Done (see care everywhere)    TETANUS VACCINE 10/03/2028 10/3/2018        No orders of the defined types were placed in this encounter.    The following information is provided to all patients.  This information is to help you find resources for any of the problems found today that may be affecting your health:                Living healthy guide: www.[facility].[place].gov      Understanding Diabetes: www.diabetes.org      Eating healthy: www.cdc.gov/healthyweight      CDC home safety checklist: www.cdc.gov/steadi/patient.html      Agency on Aging: www.goea.[place].gov      Alcoholics anonymous (AA): www.aa.org      Physical Activity: www.josefa.nih.gov/gc3lgsw      Tobacco use: www.quitwithusla.org

## 2022-02-21 ENCOUNTER — DOCUMENT SCAN (OUTPATIENT)
Dept: HOME HEALTH SERVICES | Facility: HOSPITAL | Age: 72
End: 2022-02-21
Payer: MEDICARE

## 2022-02-25 NOTE — TELEPHONE ENCOUNTER
Care Due:                  Date            Visit Type   Department     Provider  --------------------------------------------------------------------------------                                ESTABLISHED                              PATIENT -    [facility] INTERNAL  Last Visit: 01-      [facility]       Rob Sauceda  Next Visit: None Scheduled  None         None Found                                                            Last  Test          Frequency    Reason                     Performed    Due Date  --------------------------------------------------------------------------------    HBA1C.......  6 months...  glimepiride, metFORMIN...  10-   04-    Powered by [employer]. Reference number: [id number].   2/25/2022 3:26:31 AM CST

## 2022-03-01 RX ORDER — BUPROPION HYDROCHLORIDE 150 MG/1
TABLET ORAL
Qty: 90 TABLET | Refills: 3 | Status: SHIPPED | OUTPATIENT
Start: 2022-03-01

## 2022-03-01 NOTE — TELEPHONE ENCOUNTER
Refill Center Decision: APPROVE - Med(s) that meet protocol have been signed     Provider Staff:     Action is required for this patient.   Please see care gap opportunities below in Care Due Message.     Thanks!  [facility]     Appointments      Date Provider   Last Visit   1/19/2022 Rob Sauceda MD   Next Visit   Visit date not found Rob Sauceda MD     Note composed:12:51 PM 03/01/2022

## 2022-03-04 ENCOUNTER — PATIENT MESSAGE (OUTPATIENT)
Dept: INTERNAL MEDICINE | Facility: CLINIC | Age: 72
End: 2022-03-04
Payer: MEDICARE

## 2022-03-04 ENCOUNTER — NURSE TRIAGE (OUTPATIENT)
Dept: ADMINISTRATIVE | Facility: CLINIC | Age: 72
End: 2022-03-04
Payer: MEDICARE

## 2022-03-04 DIAGNOSIS — R82.90 CLOUDY URINE: Primary | ICD-10-CM

## 2022-03-04 NOTE — TELEPHONE ENCOUNTER
Tell the daughter that I want her to have some blood and urine work done on Monday and see me Thursday or Friday. Book labs in epic ordered 10/12/21 and new urine orders to be done on Monday.

## 2022-03-04 NOTE — TELEPHONE ENCOUNTER
Called Patient daughter she stating patient sugars been elevated been for a couple of weeks. It's been in the 200's in the morning before she eats. Today at 11:30 it was 354 before she ate. At 1:20 it is at 333.   Patient daughter also stating her mom urine is cloudy.    Please advise

## 2022-03-04 NOTE — TELEPHONE ENCOUNTER
Called patient daughter educated on need for labs and urine for Monday and follow up to see dr berkowitz. appt for labs and urine 3/7/22 fu appt 3/11/22 to see dr berkowitz patient daughter stated okay

## 2022-03-04 NOTE — TELEPHONE ENCOUNTER
Blood sugar was 354 at 11:30 and at 1:20pm blood sugar was 333.  Pt ate at 12 noon.  Katherin pts daughter is concerned and [facility] care advice states for office staff or PCP to call back in 1 hour.  Pt agrees with this plan.      Reason for Disposition   Blood glucose > 300 mg/dL (16.7 mmol/L) AND two or more times in a row    Additional Information   Negative: Unconscious or difficult to awaken   Negative: Acting confused (e.g., disoriented, slurred speech)   Negative: Very weak (can't stand)   Negative: Sounds like a life-threatening emergency to the triager   Negative: Vomiting and signs of dehydration (e.g., very dry mouth, lightheaded, dark urine)   Negative: Blood glucose > 240 mg/dL (13.3 mmol/L) and rapid breathing   Negative: Blood glucose > 500 mg/dL (27.8 mmol/L)   Negative: Blood glucose > 240 mg/dL (13.3 mmol/L) AND urine ketones moderate-large (or more than 1+)   Negative: Blood glucose > 240 mg/dL (13.3 mmol/L) and blood ketones > 1.4 mmol/L   Negative: Blood glucose > 240 mg/dL (13.3 mmol/L) AND vomiting AND unable to check for ketones (in blood or urine)   Negative: Vomiting lasting > 4 hours   Negative: Patient sounds very sick or weak to the triager   Negative: Fever > 100.4 F (38.0 C)   Negative: Caller has URGENT medication or insulin pump question and triager unable to answer question   Negative: Blood glucose > 400 mg/dL (22.2 mmol/L)    Protocols used: DIABETES - HIGH BLOOD SUGAR-A-OH

## 2022-03-07 ENCOUNTER — LAB VISIT (OUTPATIENT)
Dept: LAB | Facility: HOSPITAL | Age: 72
End: 2022-03-07
Attending: INTERNAL MEDICINE
Payer: MEDICARE

## 2022-03-07 DIAGNOSIS — E11.8 TYPE II DIABETES MELLITUS WITH COMPLICATION: ICD-10-CM

## 2022-03-07 LAB
ALBUMIN SERPL BCP-MCNC: 3.2 G/DL (ref 3.5–5.2)
ALP SERPL-CCNC: 67 U/L (ref 55–135)
ALT SERPL W/O P-5'-P-CCNC: 15 U/L (ref 10–44)
ANION GAP SERPL CALC-SCNC: 14 MMOL/L (ref 8–16)
AST SERPL-CCNC: 18 U/L (ref 10–40)
BASOPHILS # BLD AUTO: 0.03 K/UL (ref 0–0.2)
BASOPHILS NFR BLD: 0.4 % (ref 0–1.9)
BILIRUB SERPL-MCNC: 0.6 MG/DL (ref 0.1–1)
BUN SERPL-MCNC: 21 MG/DL (ref 8–23)
CALCIUM SERPL-MCNC: 9.9 MG/DL (ref 8.7–10.5)
CHLORIDE SERPL-SCNC: 102 MMOL/L (ref 95–110)
CHOLEST SERPL-MCNC: 165 MG/DL (ref 120–199)
CHOLEST/HDLC SERPL: 3.5 {RATIO} (ref 2–5)
CO2 SERPL-SCNC: 23 MMOL/L (ref 23–29)
CREAT SERPL-MCNC: 1 MG/DL (ref 0.5–1.4)
DIFFERENTIAL METHOD: ABNORMAL
EOSINOPHIL # BLD AUTO: 0.1 K/UL (ref 0–0.5)
EOSINOPHIL NFR BLD: 0.8 % (ref 0–8)
ERYTHROCYTE [DISTWIDTH] IN BLOOD BY AUTOMATED COUNT: 12.8 % (ref 11.5–14.5)
EST. GFR  (AFRICAN AMERICAN): >60 ML/MIN/1.73 M^2
EST. GFR  (NON AFRICAN AMERICAN): 55.6 ML/MIN/1.73 M^2
ESTIMATED AVG GLUCOSE: 183 MG/DL (ref 68–131)
GLUCOSE SERPL-MCNC: 249 MG/DL (ref 70–110)
HBA1C MFR BLD: 8 % (ref 4–5.6)
HCT VFR BLD AUTO: 37.5 % (ref 37–48.5)
HDLC SERPL-MCNC: 47 MG/DL (ref 40–75)
HDLC SERPL: 28.5 % (ref 20–50)
HGB BLD-MCNC: 11.9 G/DL (ref 12–16)
IMM GRANULOCYTES # BLD AUTO: 0.01 K/UL (ref 0–0.04)
IMM GRANULOCYTES NFR BLD AUTO: 0.1 % (ref 0–0.5)
LDLC SERPL CALC-MCNC: 93 MG/DL (ref 63–159)
LYMPHOCYTES # BLD AUTO: 1.1 K/UL (ref 1–4.8)
LYMPHOCYTES NFR BLD: 15.5 % (ref 18–48)
MCH RBC QN AUTO: 30.4 PG (ref 27–31)
MCHC RBC AUTO-ENTMCNC: 31.7 G/DL (ref 32–36)
MCV RBC AUTO: 96 FL (ref 82–98)
MONOCYTES # BLD AUTO: 0.6 K/UL (ref 0.3–1)
MONOCYTES NFR BLD: 8.5 % (ref 4–15)
NEUTROPHILS # BLD AUTO: 5.5 K/UL (ref 1.8–7.7)
NEUTROPHILS NFR BLD: 74.7 % (ref 38–73)
NONHDLC SERPL-MCNC: 118 MG/DL
NRBC BLD-RTO: 0 /100 WBC
PLATELET # BLD AUTO: 295 K/UL (ref 150–450)
PMV BLD AUTO: 10.4 FL (ref 9.2–12.9)
POTASSIUM SERPL-SCNC: 5.1 MMOL/L (ref 3.5–5.1)
PROT SERPL-MCNC: 6.9 G/DL (ref 6–8.4)
RBC # BLD AUTO: 3.91 M/UL (ref 4–5.4)
SODIUM SERPL-SCNC: 139 MMOL/L (ref 136–145)
TRIGL SERPL-MCNC: 125 MG/DL (ref 30–150)
TSH SERPL DL<=0.005 MIU/L-ACNC: 2.4 UIU/ML (ref 0.4–4)
WBC # BLD AUTO: 7.3 K/UL (ref 3.9–12.7)

## 2022-03-07 PROCEDURE — 80061 LIPID PANEL: CPT | Performed by: INTERNAL MEDICINE

## 2022-03-07 PROCEDURE — 85025 COMPLETE CBC W/AUTO DIFF WBC: CPT | Performed by: INTERNAL MEDICINE

## 2022-03-07 PROCEDURE — 84443 ASSAY THYROID STIM HORMONE: CPT | Performed by: INTERNAL MEDICINE

## 2022-03-07 PROCEDURE — 36415 COLL VENOUS BLD VENIPUNCTURE: CPT | Mod: PO | Performed by: INTERNAL MEDICINE

## 2022-03-07 PROCEDURE — 83036 HEMOGLOBIN GLYCOSYLATED A1C: CPT | Performed by: INTERNAL MEDICINE

## 2022-03-07 PROCEDURE — 80053 COMPREHEN METABOLIC PANEL: CPT | Performed by: INTERNAL MEDICINE

## 2022-03-08 ENCOUNTER — PATIENT MESSAGE (OUTPATIENT)
Dept: INTERNAL MEDICINE | Facility: CLINIC | Age: 72
End: 2022-03-08
Payer: MEDICARE

## 2022-03-08 RX ORDER — CIPROFLOXACIN 500 MG/1
500 TABLET ORAL 2 TIMES DAILY
Qty: 20 TABLET | Refills: 0 | Status: SHIPPED | OUTPATIENT
Start: 2022-03-08

## 2022-03-09 ENCOUNTER — HOSPITAL ENCOUNTER (EMERGENCY)
Facility: HOSPITAL | Age: 72
Discharge: HOME | End: 2022-03-09
Attending: EMERGENCY MEDICINE
Payer: MEDICARE

## 2022-03-09 VITALS
OXYGEN SATURATION: 97 % | TEMPERATURE: 99 F | RESPIRATION RATE: 19 BRPM | DIASTOLIC BLOOD PRESSURE: 69 MMHG | HEART RATE: 94 BPM | SYSTOLIC BLOOD PRESSURE: 164 MMHG

## 2022-03-09 DIAGNOSIS — N30.01 ACUTE CYSTITIS WITH HEMATURIA: ICD-10-CM
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
## 2022-03-09 DIAGNOSIS — M25.529 ELBOW PAIN: ICD-10-CM

## 2022-03-09 DIAGNOSIS — M79.642 PAIN OF LEFT HAND: ICD-10-CM

## 2022-03-09 PROCEDURE — 99285 EMERGENCY DEPT VISIT HI MDM: CPT | Mod: 25

## 2022-03-09 RX ORDER — CEFUROXIME AXETIL 500 MG/1
500 TABLET ORAL 2 TIMES DAILY
Qty: 20 TABLET | Refills: 0 | Status: SHIPPED | OUTPATIENT
Start: 2022-03-09 | End: 2022-03-11

## 2022-03-09 NOTE — ED PROVIDER NOTES
SCRIBE #1 NOTE: I, Darian Duffy, am scribing for, and in the presence of, Flynn Ramirez MD. I have scribed the entire note.      History      Chief Complaint   Patient presents with    Fall     Pt fell and hurt l elbow and L hand. Pt does not remember falling       Review of patient's allergies indicates:   Allergen Reactions    Morphine Hives    Donepezil Nausea And Vomiting        HPI   HPI    3/9/2022, 8:11 AM   History obtained from the patient and daughter      History of Present Illness: Mari Mayorga is a 72 y.o. female patient with a PMHx of CAD, Parkinson's disease, HTN, dementia who presents to the Emergency Department for evaluation following a fall just PTA. Pt does not remember falling, but daughter states that she found the pt on the floor. Pt reports L wrist and L elbow pain. Symptoms are constant and moderate in severity. No mitigating or exacerbating factors reported. Patient denies any fever, chills, n/v/d, SOB, CP, weakness, numbness, dizziness, headache, and all other sxs at this time. No prior Tx reported. No further complaints or concerns at this time.     Arrival mode: Personal vehicle    PCP: Rob Sauceda MD       Past Medical History:  Past Medical History:   Diagnosis Date    CAD (coronary artery disease)     s/p stent 2016    Chronic constipation     Dementia     GERD (gastroesophageal reflux disease)     History of breast cancer     bilateral breast, XRT both times, no ctx    History of CVA (cerebrovascular accident)     Hyperlipidemia associated with type 2 diabetes mellitus     Hypertension associated with diabetes     Major depression, recurrent     Parkinson disease     Type II diabetes mellitus with complication        Past Surgical History:  Past Surgical History:   Procedure Laterality Date    ADENOIDECTOMY      BACK SURGERY      BREAST LUMPECTOMY      CHOLECYSTECTOMY      HYSTERECTOMY      TONSILLECTOMY           Family History:  Family History    Problem Relation Age of Onset    Diabetes Mother        Social History:  Social History     Tobacco Use    Smoking status: Former Smoker     Packs/day: 1.00     Years: 7.00     Pack years: 7.00     Types: Cigarettes     Quit date:      Years since quittin.2    Smokeless tobacco: Never Used   Substance and Sexual Activity    Alcohol use: No    Drug use: No    Sexual activity: Never       ROS   Review of Systems   Constitutional: Negative for chills and fever.   HENT: Negative for sore throat.    Respiratory: Negative for shortness of breath.    Cardiovascular: Negative for chest pain.   Gastrointestinal: Negative for diarrhea, nausea and vomiting.   Genitourinary: Negative for dysuria.   Musculoskeletal: Positive for arthralgias (L wrist, L elbow). Negative for back pain.   Skin: Negative for rash.   Neurological: Negative for dizziness, weakness, light-headedness, numbness and headaches.   Hematological: Does not bruise/bleed easily.   All other systems reviewed and are negative.    Physical Exam      Initial Vitals [22 0800]   BP Pulse Resp Temp SpO2   (!) 136/52 90 16 99 °F (37.2 °C) 100 %      MAP       --          Physical Exam  Nursing Notes and Vital Signs Reviewed.  Constitutional: Patient is in no acute distress. Well-developed and well-nourished.  Head: Atraumatic. Normocephalic.  Eyes: PERRL. EOM intact. Conjunctivae are not pale. No scleral icterus.  ENT: Mucous membranes are moist. Oropharynx is clear and symmetric.    Neck: Supple. Full ROM.   Cardiovascular: Regular rate. Regular rhythm. No murmurs, rubs, or gallops. Distal pulses are 2+ and symmetric.  Pulmonary/Chest: No respiratory distress. Clear to auscultation bilaterally. No wheezing or rales.  Abdominal: Soft and non-distended.  There is no tenderness.  No rebound, guarding, or rigidity.   Musculoskeletal: Moves all extremities. No obvious deformities. No edema.  Skin: Warm and dry.  Neurological:  Alert, awake, and  appropriate.  Normal speech.  No acute focal neurological deficits are appreciated.  Psychiatric: Normal affect. Good eye contact. Appropriate in content.    ED Course    Procedures  ED Vital Signs:  Vitals:    03/09/22 0800 03/09/22 0848 03/09/22 0902 03/09/22 0923   BP: (!) 136/52 (!) 162/70 (!) 150/67 (!) 158/69   Pulse: 90 90 93 93   Resp: 16 18 19 19   Temp: 99 °F (37.2 °C)      TempSrc: Oral      SpO2: 100% 100% 98% 98%    03/09/22 0942   BP: (!) 164/69   Pulse: 94   Resp:    Temp: 98.7 °F (37.1 °C)   TempSrc: Oral   SpO2: 97%       Abnormal Lab Results:  Labs Reviewed - No data to display     Imaging Results:  Imaging Results          X-Ray Elbow Complete Left (Final result)  Result time 03/09/22 09:17:16    Final result by William Salmon Jr., MD (03/09/22 09:17:16)                 Impression:      No acute findings.  Mild degenerative change of the trochlear joint.      Electronically signed by: William Salmon Jr., MD  Date:    03/09/2022  Time:    09:17             Narrative:    EXAMINATION:  XR ELBOW COMPLETE 3 VIEW LEFT    CLINICAL HISTORY:  Pain in unspecified elbow    TECHNIQUE:  AP, lateral, and oblique views of the left elbow were performed.    COMPARISON:  None    FINDINGS:  No acute fracture.  Normal joint alignment.  Tiny degenerative osteophyte medial to the trochlear joint.  No definite joint effusion.                               X-Ray Hand 3 view Left (Final result)  Result time 03/09/22 09:20:34    Final result by William Salmon Jr., MD (03/09/22 09:20:34)                 Impression:      1. No acute findings.  2. 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      Electronically signed by: William Salmon Jr., MD  Date:    03/09/2022  Time:    09:20             Narrative:    EXAMINATION:  XR HAND COMPLETE 3 VIEW LEFT    CLINICAL HISTORY:  hand pain;.    TECHNIQUE:  PA, lateral, and oblique views of the left hand were  performed.    COMPARISON:  Prior from 03/12/2018 was reviewed.    FINDINGS:  Vascular calcifications.  No acute fractures.  Advanced degenerative change of the trapezial metacarpal joint with subchondral sclerosis and adjacent ossified fragments.  There is calcification of the triangular fibrocartilage.  Degenerative joint space narrowing of the 1st metacarpophalangeal joint and the interphalangeal joint of the thumb with tiny adjacent osteophytes.  Degenerative joint space narrowing of distal interphalangeal joints of the index, middle, and ring finger.                               CT Head Without Contrast (Final result)  Result time 03/09/22 08:46:24    Final result by William Salmon Jr., MD (03/09/22 08:46:24)                 Impression:      1. No acute intracranial hemorrhage.  2. Unchanged chronic infarcts and changes of moderate/severe chronic microvascular ischemia involving the white matter.  3. Ventriculomegaly likely relates to central atrophy due to white matter volume loss.  All CT scans at this facility use dose modulation, iterative reconstruction, and/or weight base dosing when appropriate to reduce radiation dose to as low as reasonably achievable.      Electronically signed by: William Salmon Jr., MD  Date:    03/09/2022  Time:    08:46             Narrative:    EXAMINATION:  CT HEAD WITHOUT CONTRAST    CLINICAL HISTORY:  Head trauma, minor (Age >= 65y);    TECHNIQUE:  Contiguous axial CT images were obtained from the skull base through the vertex without intravenous contrast.    COMPARISON:  Prior CT of the head from 10/22/2020.    FINDINGS:  Soft tissue contusion about the right frontal scalp has diminished.  No acute intracranial hemorrhage.  No mass effect or midline shift. There is extensive patchy low-density within the periventricular and deep white matter.  Unchanged chronic infarction involving the right body of the caudate nucleus and right head of the caudate nucleus.  Unchanged chronic  lacunar infarct within the left thalamus.  Chronic lacunar infarct within the right gabriela.  Small, chronic infarct within the superior right cerebellum which is new compared to prior.  Mild generalized atrophy with ventriculomegaly that is unchanged.  The ventriculomegaly likely relates to central atrophy due to white matter volume loss.  The paranasal sinuses and mastoid air cells are clear.  No concerning osseous findings.                                        The Emergency Provider reviewed the vital signs and test results, which are outlined above.    ED Discussion     9:37 AM: Reassessed pt at this time. Discussed with pt all pertinent ED information and results. Discussed pt dx and plan of tx. Gave pt all f/u and return to the ED instructions. All questions and concerns were addressed at this time. Pt expresses understanding of information and instructions, and is comfortable with plan to discharge. Pt is stable for discharge.    I discussed with patient and/or family/caretaker that evaluation in the ED does not suggest any emergent or life threatening medical conditions requiring immediate intervention beyond what was provided in the ED, and I believe patient is safe for discharge.  Regardless, an unremarkable evaluation in the ED does not preclude the development or presence of a serious of life threatening condition. As such, patient was instructed to return immediately for any worsening or change in current symptoms.         ED Medication(s):  Medications - No data to display     Follow-up Information     Rob Sauceda MD.    Specialty: Internal Medicine  Contact information:  [address]  [address]  [facility] [id number] [phone]                        Discharge Medication List as of 3/9/2022  9:40 AM      START taking these medications    Details   cefUROXime (CEFTIN) 500 MG tablet Take 1 tablet (500 mg total) by mouth 2 (two) times daily. for 10 days, Starting Wed 3/9/2022, Until Sat  3/19/2022, Normal               Medical Decision Making    Medical Decision Making:   Clinical Tests:   Radiological Study: Ordered and Reviewed           Scribe Attestation:   Scribe #1: I performed the above scribed service and the documentation accurately describes the services I performed. I attest to the accuracy of the note.    Attending:   Physician Attestation Statement for Scribe #1: I, Flynn Ramirez MD, personally performed the services described in this documentation, as scribed by Darian Duffy, in my presence, and it is both accurate and complete.          Clinical Impression       ICD-10-CM ICD-9-CM   1. Fall, initial encounter  W19.XXXA E888.9   2. Elbow pain  M25.529 719.42   3. Pain of left hand  M79.642 729.5   4. Acute cystitis with hematuria  N30.01 595.0       Disposition:   Disposition: Discharged  Condition: Stable         Flynn Ramirez MD  03/09/22 1018

## 2022-03-11 ENCOUNTER — OFFICE VISIT (OUTPATIENT)
Dept: INTERNAL MEDICINE | Facility: CLINIC | Age: 72
End: 2022-03-11
Payer: MEDICARE

## 2022-03-11 ENCOUNTER — TELEPHONE (OUTPATIENT)
Dept: INTERNAL MEDICINE | Facility: CLINIC | Age: 72
End: 2022-03-11

## 2022-03-11 VITALS
HEIGHT: 64 IN | SYSTOLIC BLOOD PRESSURE: 120 MMHG | HEART RATE: 92 BPM | WEIGHT: 153.25 LBS | OXYGEN SATURATION: 96 % | BODY MASS INDEX: 26.16 KG/M2 | DIASTOLIC BLOOD PRESSURE: 50 MMHG

## 2022-03-11 DIAGNOSIS — G30.1 LATE ONSET ALZHEIMER'S DEMENTIA WITHOUT BEHAVIORAL DISTURBANCE: Primary | ICD-10-CM

## 2022-03-11 DIAGNOSIS — I15.2 HYPERTENSION ASSOCIATED WITH DIABETES: ICD-10-CM

## 2022-03-11 DIAGNOSIS — Z02.2 ENCOUNTER FOR EXAMINATION FOR ADMISSION TO NURSING HOME: ICD-10-CM

## 2022-03-11 DIAGNOSIS — Z91.81 RISK FOR FALLS: ICD-10-CM

## 2022-03-11 DIAGNOSIS — I25.10 CORONARY ARTERY DISEASE, UNSPECIFIED VESSEL OR LESION TYPE, UNSPECIFIED WHETHER ANGINA PRESENT, UNSPECIFIED WHETHER NATIVE OR TRANSPLANTED HEART: ICD-10-CM

## 2022-03-11 DIAGNOSIS — E78.5 HYPERLIPIDEMIA ASSOCIATED WITH TYPE 2 DIABETES MELLITUS: ICD-10-CM

## 2022-03-11 DIAGNOSIS — E11.59 HYPERTENSION ASSOCIATED WITH DIABETES: ICD-10-CM

## 2022-03-11 DIAGNOSIS — E11.69 HYPERLIPIDEMIA ASSOCIATED WITH TYPE 2 DIABETES MELLITUS: ICD-10-CM

## 2022-03-11 DIAGNOSIS — E11.8 TYPE II DIABETES MELLITUS WITH COMPLICATION: ICD-10-CM

## 2022-03-11 DIAGNOSIS — F02.80 LATE ONSET ALZHEIMER'S DEMENTIA WITHOUT BEHAVIORAL DISTURBANCE: Primary | ICD-10-CM

## 2022-03-11 DIAGNOSIS — G20.A1 PARKINSON DISEASE: ICD-10-CM

## 2022-03-11 PROCEDURE — 99214 OFFICE O/P EST MOD 30 MIN: CPT | Mod: PBBFAC,PO | Performed by: INTERNAL MEDICINE

## 2022-03-11 PROCEDURE — 99999 PR PBB SHADOW E&M-EST. PATIENT-LVL IV: CPT | Mod: PBBFAC,,, | Performed by: INTERNAL MEDICINE

## 2022-03-11 PROCEDURE — 86580 TB INTRADERMAL TEST: CPT | Mod: PBBFAC,PO

## 2022-03-11 PROCEDURE — 99213 OFFICE O/P EST LOW 20 MIN: CPT | Mod: 25,S$PBB,, | Performed by: INTERNAL MEDICINE

## 2022-03-11 PROCEDURE — 99213 PR OFFICE/OUTPT VISIT, EST, LEVL III, 20-29 MIN: ICD-10-PCS | Mod: 25,S$PBB,, | Performed by: INTERNAL MEDICINE

## 2022-03-11 PROCEDURE — 99999 PR PBB SHADOW E&M-EST. PATIENT-LVL IV: ICD-10-PCS | Mod: PBBFAC,,, | Performed by: INTERNAL MEDICINE

## 2022-03-11 RX ORDER — DULAGLUTIDE 0.75 MG/.5ML
0.75 INJECTION, SOLUTION SUBCUTANEOUS
Qty: 4 PEN | Refills: 11 | Status: SHIPPED | OUTPATIENT
Start: 2022-03-11 | End: 2023-03-11

## 2022-03-11 NOTE — PROGRESS NOTES
"HPI:  Patient is a 72-year-old female who is brought in today by his daughter in order to fill out paperwork for her to either go to assisted living or to a nursing home.  There has been significant decline in her functioning status in the last 2-3 months.  I personally think she will require nursing home versus assisted living.  She needs assistance with toileting, bathing, dressing and ambulation.  She needs total assistance for her medications.  Daughter states her blood sugars have been in the 300s for the last week.  Patient is currently on antibiotics for urinary tract infection.  She continues to have problems with incontinence both of urine and feces.  There has been a continue gradual decline in her cognitive function.    Current meds have been verified and updated per the EMR  Exam:BP (!) 120/50 (BP Location: Left arm)   Pulse 92   Ht 5' 4" (1.626 m)   Wt 69.5 kg (153 lb 3.5 oz)   SpO2 96%   BMI 26.30 kg/m²   Exam deferred    Lab Results   Component Value Date    WBC 7.30 03/07/2022    HGB 11.9 (L) 03/07/2022    HCT 37.5 03/07/2022     03/07/2022    CHOL 165 03/07/2022    TRIG 125 03/07/2022    HDL 47 03/07/2022    ALT 15 03/07/2022    AST 18 03/07/2022     03/07/2022    K 5.1 03/07/2022     03/07/2022    CREATININE 1.0 03/07/2022    BUN 21 03/07/2022    CO2 23 03/07/2022    TSH 2.400 03/07/2022    INR 1.1 03/06/2020    HGBA1C 8.0 (H) 03/07/2022       Impression:  Diabetes, most recent significant elevations in her blood sugar most likely is partly related to the stress a urine tract infection.  Progressive dementia  Parkinson's disease  At high risk for falling  Patient Active Problem List   Diagnosis    Type II diabetes mellitus with complication    Hypertension associated with diabetes    Hyperlipidemia associated with type 2 diabetes mellitus    Parkinson disease    CAD (coronary artery disease)    GERD (gastroesophageal reflux disease)    Chronic constipation    History " of breast cancer    History of CVA (cerebrovascular accident)    Major depression, recurrent    Dementia    Aortic atherosclerosis    Risk for falls       Plan:  Orders Placed This Encounter    POCT TB Skin Test Read    dulaglutide (TRULICITY) 0.75 mg/0.5 mL pen injector     Patient was started on Trulicity.  She had a skin test done today.  The daughter will bring the forms back on Monday so that we can fill them out after reading the results the skin test.  I recommend that the daughter bring the patient to the assisted living facility his see if she would qualify.  I suspect they will want to place her in the nursing home.    This note is generated with speech recognition software and is subject to transcription error and sound alike phrases that may be missed by proofreading.

## 2022-03-11 NOTE — TELEPHONE ENCOUNTER
----- Message from Olivia Stone sent at 3/11/2022 12:22 PM CST -----  Type:  Needs Medical Advice    Who Called:Katherin daughter  Symptoms (please be specific): high glucose 526  How long has patient had these symptoms:  now  Would the patient rather a call back or a response via [facility]?call back  Best Call Back Number: [phone]  Additional Information: pt was in clinic this morning, the level was 438, need to know what else she can do

## 2022-03-11 NOTE — TELEPHONE ENCOUNTER
Spoke with daughter. Encouraged her to have pt drink lots of water and check blood sugar twice per day. Make sure she is not eating any simple carbs as well. Daughter will send me FS readings on Monday

## 2022-03-11 NOTE — PROGRESS NOTES
Tuberculin skin test applied to left ventral forearm. Explained how to read the test, measuring induration not just erythema; she will come into office if test appears positive.  PPD Placement note  Mari Mayorga, 72 y.o. female is here today for placement of PPD test  Reason for PPD test: Routine  Pt taken PPD test before: no  Verified in allergy area and with patient that they are not allergic to the products PPD is made of (Phenol or Tween). Yes  Is patient taking any oral or IV steroid medication now or have they taken it in the last month? no  Has the patient ever received the BCG vaccine?: no  Has the patient been in recent contact with anyone known or suspected of having active TB disease?: no       Date of exposure (if applicable): N/A       Name of person they were exposed to (if applicable): N/A  Patient's Country of origin?: [place]  O: Alert and oriented in NAD.  P:  PPD placed on 3/11/2022.  Patient advised to return for reading within 48-72 hours.

## 2022-03-14 ENCOUNTER — TELEPHONE (OUTPATIENT)
Dept: INTERNAL MEDICINE | Facility: CLINIC | Age: 72
End: 2022-03-14

## 2022-03-14 ENCOUNTER — CLINICAL SUPPORT (OUTPATIENT)
Dept: INTERNAL MEDICINE | Facility: CLINIC | Age: 72
End: 2022-03-14
Payer: MEDICARE

## 2022-03-14 LAB
TB INDURATION - 48 HR READ: NORMAL
TB INDURATION - 72 HR READ: 0 MM
TB SKIN TEST - 48 HR READ: NORMAL
TB SKIN TEST - 72 HR READ: NEGATIVE

## 2022-03-14 NOTE — PROGRESS NOTES
PPD Reading Note  PPD read and results entered in [employer].  Result: 0 mm induration.  Interpretation: Negative  If test not read within 48-72 hours of initial placement, patient advised to repeat in other arm 1-3 weeks after this test.  Allergic reaction: no

## 2022-03-14 NOTE — TELEPHONE ENCOUNTER
Call daughter and tell her to continue to encourage her to drink plenty of water. The BS should continue to improve over the next week. Ask her to message me and send her BS readings next Monday.

## 2022-03-14 NOTE — TELEPHONE ENCOUNTER
[facility]  Please advise

## 2022-03-14 NOTE — LETTER
March 14, 2022             [facility]  [address]  [place] [address]  Phone: [phone]  Fax: [phone] March 14, 2022       Patient: Mari Mayorga   YOB: 1950   Date of Visit: 3/14/2022       To Whom It May Concern:    Our clinic recently performed a tuberculosis skin test on  Mari Mayorga. The results of this test are as follows:    PPD Test Value       3/14/2022         Tb Skin Test Negative    TB Induration(mm) 0        Current PPD Immunization     Name Date Dose VIS Date Route    PPD Test 3/11/2022 0.1 mL N/A Intradermal    Site: Left arm    Given By: Wilber Marc LPN    : Other     Lot: [id number]    Expiration Date: 10/31/2022          This test was negative for tuberculosis exposure per current Centers for Disease Control guidelines.    A chest x-ray was not required.    If you have any questions or concerns, please don't hesitate to call.    Sincerely        INTERNAL MEDICINE NURSE, [facility]

## 2022-03-14 NOTE — TELEPHONE ENCOUNTER
Called mickey educated to encourage patient to drink plenty of water , BS should improve over the next week and monitor bs send in new readings Monday . Mickey stated okay thank you

## 2022-03-18 ENCOUNTER — TELEPHONE (OUTPATIENT)
Dept: INTERNAL MEDICINE | Facility: CLINIC | Age: 72
End: 2022-03-18
Payer: MEDICARE

## 2022-03-18 NOTE — TELEPHONE ENCOUNTER
----- Message from Evert Cordero sent at 3/18/2022 10:09 AM CDT -----  Contact: Nurse Winn/[facility]  Nurse Tatum with [facility] is calling to speak with the nurse regarding mutual patient. Needing clarification for prescriptions as a new resident and admission. Please give Nurse Winn high priority call back at [phone]   Thanks,  RP

## 2022-03-21 LAB
CHOLEST SERPL-MSCNC: 159 MG/DL (ref 0–200)
HBA1C MFR BLD: 9 % (ref ?–5.8)
HDLC SERPL-MCNC: 36 MG/DL (ref 35–70)
LDLC SERPL CALC-MCNC: 98 MG/DL (ref 0–129)
TRIGL SERPL-MCNC: 125 MG/DL (ref ?–150)

## 2022-03-24 ENCOUNTER — NURSE TRIAGE (OUTPATIENT)
Dept: ADMINISTRATIVE | Facility: CLINIC | Age: 72
End: 2022-03-24
Payer: MEDICARE

## 2022-03-24 NOTE — TELEPHONE ENCOUNTER
Spoke with Gretta with [facility], who states that pt lives in assisted living facility. States that Pt BG was 496, and was given sliding scale insulin of 14 units, states she is unsure when insulin was given. States that she just rechecked BG and it was 434. Sates that she was told that pt.'s BG has been running davina for past few weeks. States that pt has been eating things such as pancakes with syrup, and chocolate cake yesterday. nurse states that pt is asymptomatic. Advised that pt go to the ED now. Verbalized understanding.    Reason for Disposition   Patient sounds very sick or weak to the triager    Additional Information   Negative: Unconscious or difficult to awaken   Negative: Acting confused (e.g., disoriented, slurred speech)   Negative: Very weak (can't stand)   Negative: Sounds like a life-threatening emergency to the triager   Negative: Vomiting and signs of dehydration (e.g., very dry mouth, lightheaded, dark urine)   Negative: Blood glucose > 240 mg/dL (13.3 mmol/L) and rapid breathing   Negative: Blood glucose > 500 mg/dL (27.8 mmol/L)   Negative: Blood glucose > 240 mg/dL (13.3 mmol/L) AND urine ketones moderate-large (or more than 1+)   Negative: Blood glucose > 240 mg/dL (13.3 mmol/L) and blood ketones > 1.4 mmol/L   Negative: Blood glucose > 240 mg/dL (13.3 mmol/L) AND vomiting AND unable to check for ketones (in blood or urine)   Negative: Vomiting lasting > 4 hours    Protocols used: DIABETES - HIGH BLOOD SUGAR-A-OH

## 2022-04-20 ENCOUNTER — TELEPHONE (OUTPATIENT)
Dept: INTERNAL MEDICINE | Facility: CLINIC | Age: 72
End: 2022-04-20
Payer: MEDICARE

## 2022-04-20 NOTE — TELEPHONE ENCOUNTER
----- Message from Vriginia Cabezas sent at 4/20/2022 10:29 AM CDT -----  Contact: Megan with [facility]  Megan with [facility] would like to consult with a nurse in regards to the patient last office visit notes she stated notes can be faxed over to [phone] or call back is [phone]. Thanks r/s

## 2022-05-05 ENCOUNTER — PATIENT MESSAGE (OUTPATIENT)
Dept: RESEARCH | Facility: HOSPITAL | Age: 72
End: 2022-05-05
Payer: MEDICARE

## 2022-05-11 ENCOUNTER — PATIENT MESSAGE (OUTPATIENT)
Dept: ADMINISTRATIVE | Facility: HOSPITAL | Age: 72
End: 2022-05-11
Payer: MEDICARE

## 2022-05-11 ENCOUNTER — OFFICE VISIT (OUTPATIENT)
Dept: INTERNAL MEDICINE | Facility: CLINIC | Age: 72
End: 2022-05-11
Payer: MEDICARE

## 2022-05-11 VITALS
HEIGHT: 64 IN | BODY MASS INDEX: 23.49 KG/M2 | OXYGEN SATURATION: 97 % | DIASTOLIC BLOOD PRESSURE: 62 MMHG | WEIGHT: 137.56 LBS | TEMPERATURE: 98 F | SYSTOLIC BLOOD PRESSURE: 118 MMHG | HEART RATE: 99 BPM

## 2022-05-11 DIAGNOSIS — G20.A1 PARKINSON DISEASE: ICD-10-CM

## 2022-05-11 DIAGNOSIS — Z86.73 HISTORY OF CVA (CEREBROVASCULAR ACCIDENT): Primary | ICD-10-CM

## 2022-05-11 PROCEDURE — 99999 PR PBB SHADOW E&M-EST. PATIENT-LVL III: CPT | Mod: PBBFAC,,, | Performed by: INTERNAL MEDICINE

## 2022-05-11 PROCEDURE — 99213 OFFICE O/P EST LOW 20 MIN: CPT | Mod: PBBFAC,PO | Performed by: INTERNAL MEDICINE

## 2022-05-11 PROCEDURE — 99213 PR OFFICE/OUTPT VISIT, EST, LEVL III, 20-29 MIN: ICD-10-PCS | Mod: S$PBB,,, | Performed by: INTERNAL MEDICINE

## 2022-05-11 PROCEDURE — 99213 OFFICE O/P EST LOW 20 MIN: CPT | Mod: S$PBB,,, | Performed by: INTERNAL MEDICINE

## 2022-05-11 PROCEDURE — 99999 PR PBB SHADOW E&M-EST. PATIENT-LVL III: ICD-10-PCS | Mod: PBBFAC,,, | Performed by: INTERNAL MEDICINE

## 2022-05-11 RX ORDER — DOXYCYCLINE HYCLATE 100 MG
100 TABLET ORAL 2 TIMES DAILY
COMMUNITY
Start: 2022-05-03

## 2022-05-11 NOTE — PROGRESS NOTES
"HPI:  Patient is 72-year-old female who is brought in by the daughter for discussion about a power wheelchair.  Patient has pretty advanced dementia.  I am quite concerned that she would not cognitively be able to control a power wheelchair.  In discussing with the daughter we both agreed that a standard wheelchair would be better.  She is less likely to hurt herself or someone else.    Current meds have been verified and updated per the EMR  Exam:/62   Pulse 99   Temp 98.2 °F (36.8 °C) (Temporal)   Ht 5' 4" (1.626 m)   Wt 62.4 kg (137 lb 9.1 oz)   SpO2 97%   BMI 23.61 kg/m²   Exam deferred    Lab Results   Component Value Date    WBC 7.30 03/07/2022    HGB 11.9 (L) 03/07/2022    HCT 37.5 03/07/2022     03/07/2022    CHOL 165 03/07/2022    TRIG 125 03/07/2022    HDL 47 03/07/2022    ALT 15 03/07/2022    AST 18 03/07/2022     03/07/2022    K 5.1 03/07/2022     03/07/2022    CREATININE 1.0 03/07/2022    BUN 21 03/07/2022    CO2 23 03/07/2022    TSH 2.400 03/07/2022    INR 1.1 03/06/2020    HGBA1C 8.0 (H) 03/07/2022       Impression:  Advanced dementia and debility due related to her dementia and Parkinson's  Patient Active Problem List   Diagnosis    Type II diabetes mellitus with complication    Hypertension associated with diabetes    Hyperlipidemia associated with type 2 diabetes mellitus    Parkinson disease    CAD (coronary artery disease)    GERD (gastroesophageal reflux disease)    Chronic constipation    History of breast cancer    History of CVA (cerebrovascular accident)    Major depression, recurrent    Dementia    Aortic atherosclerosis    Risk for falls       Plan:     She was given a prescription to get a wheelchair.    This note is generated with speech recognition software and is subject to transcription error and sound alike phrases that may be missed by proofreading.      "

## 2022-05-16 LAB
ALBUMIN SERPL BCP-MCNC: 3.1 G/DL (ref 3.5–5)
ALP SERPL-CCNC: 60 U/L (ref 25–125)
ALT SERPL W P-5'-P-CCNC: 4 U/L (ref 7–35)
ANION GAP SERPL CALC-SCNC: 13 MMOL/L (ref ?–30)
AST SERPL-CCNC: 10 U/L (ref 13–35)
BILIRUB SERPL-MCNC: 0.6 MG/DL (ref 0.1–1.4)
BUN SERPL-MCNC: 11 MG/DL
CALCIUM SERPL-MCNC: 8.2 MG/DL (ref 8.7–10.7)
CHLORIDE SERPL-SCNC: 101 MMOL/L (ref 99–108)
CO2 SERPL-SCNC: 27 MMOL/L (ref 13–22)
CREAT SERPL-MCNC: 0.9 MG/DL (ref 0.5–1.1)
EST. GFR  (NO RACE VARIABLE): 69
GLUCOSE SERPL-MCNC: 205 MG/DL (ref ?–100)
HBA1C MFR BLD: 9 % (ref ?–5.7)
POTASSIUM SERPL-SCNC: 4.3 MMOL/L (ref 3.4–5.3)
PROT SERPL-MCNC: 5.6 G/DL
SODIUM BLD-SCNC: 141 MMOL/L (ref 137–147)

## 2022-06-02 ENCOUNTER — PATIENT OUTREACH (OUTPATIENT)
Dept: ADMINISTRATIVE | Facility: HOSPITAL | Age: 72
End: 2022-06-02
Payer: MEDICARE

## 2022-06-30 RX ORDER — METFORMIN HYDROCHLORIDE 500 MG/1
500 TABLET ORAL 2 TIMES DAILY WITH MEALS
Qty: 180 TABLET | Refills: 3 | Status: SHIPPED | OUTPATIENT
Start: 2022-06-30

## 2022-06-30 NOTE — TELEPHONE ENCOUNTER
No new care gaps identified.  [facility] Embedded Care Gaps. Reference number: [id number]. 6/30/2022   10:32:42 AM NONAT

## 2022-07-24 ENCOUNTER — HOSPITAL ENCOUNTER (EMERGENCY)
Facility: HOSPITAL | Age: 72
Discharge: HOME | End: 2022-07-24
Attending: EMERGENCY MEDICINE
Payer: MEDICARE

## 2022-07-24 VITALS
DIASTOLIC BLOOD PRESSURE: 72 MMHG | SYSTOLIC BLOOD PRESSURE: 150 MMHG | BODY MASS INDEX: 22.61 KG/M2 | OXYGEN SATURATION: 100 % | TEMPERATURE: 98 F | HEART RATE: 98 BPM | RESPIRATION RATE: 19 BRPM | WEIGHT: 131.69 LBS

## 2022-07-24 DIAGNOSIS — N39.0 URINARY TRACT INFECTION WITH HEMATURIA, SITE UNSPECIFIED: ICD-10-CM

## 2022-07-24 DIAGNOSIS — R31.9 URINARY TRACT INFECTION WITH HEMATURIA, SITE UNSPECIFIED: ICD-10-CM

## 2022-07-24 DIAGNOSIS — M47.816 OSTEOARTHRITIS OF LUMBAR SPINE, UNSPECIFIED SPINAL OSTEOARTHRITIS COMPLICATION STATUS: ICD-10-CM
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
## 2022-07-24 DIAGNOSIS — S39.012A STRAIN OF LUMBAR REGION, INITIAL ENCOUNTER: ICD-10-CM

## 2022-07-24 DIAGNOSIS — R07.9 CHEST PAIN: ICD-10-CM

## 2022-07-24 LAB
ALBUMIN SERPL BCP-MCNC: 3.5 G/DL (ref 3.5–5.2)
ALP SERPL-CCNC: 62 U/L (ref 55–135)
ALT SERPL W/O P-5'-P-CCNC: 13 U/L (ref 10–44)
ANION GAP SERPL CALC-SCNC: 11 MMOL/L (ref 8–16)
AST SERPL-CCNC: 13 U/L (ref 10–40)
BACTERIA #/AREA URNS HPF: ABNORMAL /HPF
BASOPHILS # BLD AUTO: 0.04 K/UL (ref 0–0.2)
BASOPHILS NFR BLD: 0.3 % (ref 0–1.9)
BILIRUB SERPL-MCNC: 0.4 MG/DL (ref 0.1–1)
BILIRUB UR QL STRIP: NEGATIVE
BUN SERPL-MCNC: 25 MG/DL (ref 8–23)
CALCIUM SERPL-MCNC: 10 MG/DL (ref 8.7–10.5)
CHLORIDE SERPL-SCNC: 107 MMOL/L (ref 95–110)
CK SERPL-CCNC: 39 U/L (ref 20–180)
CLARITY UR: ABNORMAL
CO2 SERPL-SCNC: 24 MMOL/L (ref 23–29)
COLOR UR: YELLOW
CREAT SERPL-MCNC: 1.1 MG/DL (ref 0.5–1.4)
DIFFERENTIAL METHOD: ABNORMAL
EOSINOPHIL # BLD AUTO: 0.1 K/UL (ref 0–0.5)
EOSINOPHIL NFR BLD: 1 % (ref 0–8)
ERYTHROCYTE [DISTWIDTH] IN BLOOD BY AUTOMATED COUNT: 13.4 % (ref 11.5–14.5)
EST. GFR  (AFRICAN AMERICAN): 57 ML/MIN/1.73 M^2
EST. GFR  (NON AFRICAN AMERICAN): 50 ML/MIN/1.73 M^2
GLUCOSE SERPL-MCNC: 171 MG/DL (ref 70–110)
GLUCOSE UR QL STRIP: ABNORMAL
HCT VFR BLD AUTO: 39.9 % (ref 37–48.5)
HGB BLD-MCNC: 12.9 G/DL (ref 12–16)
HGB UR QL STRIP: ABNORMAL
HYALINE CASTS #/AREA URNS LPF: 0 /LPF
IMM GRANULOCYTES # BLD AUTO: 0.03 K/UL (ref 0–0.04)
IMM GRANULOCYTES NFR BLD AUTO: 0.3 % (ref 0–0.5)
KETONES UR QL STRIP: ABNORMAL
LEUKOCYTE ESTERASE UR QL STRIP: ABNORMAL
LYMPHOCYTES # BLD AUTO: 1.5 K/UL (ref 1–4.8)
LYMPHOCYTES NFR BLD: 13.1 % (ref 18–48)
MCH RBC QN AUTO: 29.5 PG (ref 27–31)
MCHC RBC AUTO-ENTMCNC: 32.3 G/DL (ref 32–36)
MCV RBC AUTO: 91 FL (ref 82–98)
MICROSCOPIC COMMENT: ABNORMAL
MONOCYTES # BLD AUTO: 0.8 K/UL (ref 0.3–1)
MONOCYTES NFR BLD: 6.9 % (ref 4–15)
NEUTROPHILS # BLD AUTO: 9 K/UL (ref 1.8–7.7)
NEUTROPHILS NFR BLD: 78.4 % (ref 38–73)
NITRITE UR QL STRIP: NEGATIVE
NRBC BLD-RTO: 0 /100 WBC
PH UR STRIP: 5 [PH] (ref 5–8)
PLATELET # BLD AUTO: 319 K/UL (ref 150–450)
PMV BLD AUTO: 9.4 FL (ref 9.2–12.9)
POTASSIUM SERPL-SCNC: 4.4 MMOL/L (ref 3.5–5.1)
PROT SERPL-MCNC: 7.6 G/DL (ref 6–8.4)
PROT UR QL STRIP: ABNORMAL
RBC # BLD AUTO: 4.38 M/UL (ref 4–5.4)
RBC #/AREA URNS HPF: 34 /HPF (ref 0–4)
SODIUM SERPL-SCNC: 142 MMOL/L (ref 136–145)
SP GR UR STRIP: 1.02 (ref 1–1.03)
SQUAMOUS #/AREA URNS HPF: 2 /HPF
TROPONIN I SERPL DL<=0.01 NG/ML-MCNC: <0.006 NG/ML (ref 0–0.03)
TROPONIN I SERPL DL<=0.01 NG/ML-MCNC: <0.006 NG/ML (ref 0–0.03)
URN SPEC COLLECT METH UR: ABNORMAL
UROBILINOGEN UR STRIP-ACNC: NEGATIVE EU/DL
WBC # BLD AUTO: 11.46 K/UL (ref 3.9–12.7)
WBC #/AREA URNS HPF: >100 /HPF (ref 0–5)
WBC CLUMPS URNS QL MICRO: ABNORMAL
YEAST URNS QL MICRO: ABNORMAL

## 2022-07-24 PROCEDURE — 63600175 PHARM REV CODE 636 W HCPCS: Performed by: EMERGENCY MEDICINE

## 2022-07-24 PROCEDURE — 93010 ELECTROCARDIOGRAM REPORT: CPT | Mod: ,,, | Performed by: INTERNAL MEDICINE

## 2022-07-24 PROCEDURE — 80053 COMPREHEN METABOLIC PANEL: CPT | Performed by: EMERGENCY MEDICINE

## 2022-07-24 PROCEDURE — 96365 THER/PROPH/DIAG IV INF INIT: CPT

## 2022-07-24 PROCEDURE — 93010 EKG 12-LEAD: ICD-10-PCS | Mod: ,,, | Performed by: INTERNAL MEDICINE

## 2022-07-24 PROCEDURE — 99285 EMERGENCY DEPT VISIT HI MDM: CPT | Mod: 25

## 2022-07-24 PROCEDURE — 85025 COMPLETE CBC W/AUTO DIFF WBC: CPT | Performed by: EMERGENCY MEDICINE

## 2022-07-24 PROCEDURE — 84484 ASSAY OF TROPONIN QUANT: CPT | Mod: 91 | Performed by: EMERGENCY MEDICINE

## 2022-07-24 PROCEDURE — 81000 URINALYSIS NONAUTO W/SCOPE: CPT | Performed by: EMERGENCY MEDICINE

## 2022-07-24 PROCEDURE — 96361 HYDRATE IV INFUSION ADD-ON: CPT

## 2022-07-24 PROCEDURE — 87086 URINE CULTURE/COLONY COUNT: CPT | Performed by: EMERGENCY MEDICINE

## 2022-07-24 PROCEDURE — 25000003 PHARM REV CODE 250: Performed by: EMERGENCY MEDICINE

## 2022-07-24 PROCEDURE — 93005 ELECTROCARDIOGRAM TRACING: CPT

## 2022-07-24 PROCEDURE — 82550 ASSAY OF CK (CPK): CPT | Performed by: EMERGENCY MEDICINE

## 2022-07-24 RX ORDER — MELOXICAM 7.5 MG/1
7.5 TABLET ORAL DAILY
Qty: 10 TABLET | Refills: 0 | Status: SHIPPED | OUTPATIENT
Start: 2022-07-24 | End: 2022-07-24 | Stop reason: SDUPTHER

## 2022-07-24 RX ORDER — CEFDINIR 300 MG/1
300 CAPSULE ORAL 2 TIMES DAILY
Qty: 20 CAPSULE | Refills: 0 | Status: SHIPPED | OUTPATIENT
Start: 2022-07-24 | End: 2022-08-03

## 2022-07-24 RX ORDER — MELOXICAM 7.5 MG/1
7.5 TABLET ORAL DAILY
Qty: 10 TABLET | Refills: 0 | Status: SHIPPED | OUTPATIENT
Start: 2022-07-24 | End: 2022-08-03

## 2022-07-24 RX ORDER — CEFDINIR 300 MG/1
300 CAPSULE ORAL 2 TIMES DAILY
Qty: 20 CAPSULE | Refills: 0 | Status: SHIPPED | OUTPATIENT
Start: 2022-07-24 | End: 2022-07-24 | Stop reason: SDUPTHER

## 2022-07-24 RX ADMIN — SODIUM CHLORIDE 1000 ML: 0.9 INJECTION, SOLUTION INTRAVENOUS at 09:07

## 2022-07-24 RX ADMIN — CEFTRIAXONE 1 G: 1 INJECTION, SOLUTION INTRAVENOUS at 10:07

## 2022-07-24 NOTE — ED NOTES
In/out cath performed using sterile technique. Pt tolerated well and specimen collect on first attempt.

## 2022-07-24 NOTE — ED PROVIDER NOTES
SCRIBE #1 NOTE: I, Kai Zurita, am scribing for, and in the presence of, Bettina Shafer Do, MD. I have scribed the entire note.       History     Chief Complaint   Patient presents with    Fall     Unwitnessed fall at nursing home. Unknown time of fall. No complaints of pain     Review of patient's allergies indicates:   Allergen Reactions    Morphine Hives    Donepezil Nausea And Vomiting         History of Present Illness     HPI    7/24/2022, 8:58 AM  History obtained from the patient and EMS      History of Present Illness: Mari Mayorga is a 72 y.o. female patient with a PMHx of T2DM, HTN, HLP, Parkinson, CAD, GERD, breast cancer, hx of CVA and Alzheimer's who presents to the Emergency Department for evaluation of a fall which onset gradually this morning. Pt is from [facility]. EMS states the pt was found on the floor in on of her friend's rooms laying on her side this morning. Pt states she does not remember how she got there or falling. Pt does not report hitting her head. Per EMS, [facility] reports the pt complained of back pain and weakness. [facility] got the pt up and into a wheelchair. Upon EMS arrival, pt did not complain of any pain and stood up and made her way to the stretcher. Symptoms are constant and moderate in severity. No mitigating or exacerbating factors reported. No associated sxs. Patient denies any SOB, CP, dizziness, pelvic pain, neck pain, HA, numbness, and all other sxs at this time.  No further complaints or concerns at this time.       Arrival mode:  EMS      PCP: Rob Sauceda MD        Past Medical History:  Past Medical History:   Diagnosis Date    Alzheimer's disease, unspecified (CODE)     CAD (coronary artery disease)     s/p stent 2016    Chronic constipation     GERD (gastroesophageal reflux disease)     History of breast cancer     bilateral breast, XRT both times, no ctx    History of CVA (cerebrovascular accident)     Hyperlipidemia associated  with type 2 diabetes mellitus     Hypertension associated with diabetes     Major depression, recurrent     Parkinson disease     Type II diabetes mellitus with complication        Past Surgical History:  Past Surgical History:   Procedure Laterality Date    ADENOIDECTOMY      BACK SURGERY      BREAST LUMPECTOMY      CHOLECYSTECTOMY      HYSTERECTOMY      TONSILLECTOMY           Family History:  Family History   Problem Relation Age of Onset    Diabetes Mother        Social History:  Social History     Tobacco Use    Smoking status: Former Smoker     Packs/day: 1.00     Years: 7.00     Pack years: 7.00     Types: Cigarettes     Quit date:      Years since quittin.5    Smokeless tobacco: Never Used   Substance and Sexual Activity    Alcohol use: No    Drug use: No    Sexual activity: Never        Review of Systems     Review of Systems   Constitutional: Negative for fever.   HENT: Negative for sore throat.    Respiratory: Negative for shortness of breath.    Cardiovascular: Negative for chest pain.   Gastrointestinal: Negative for abdominal pain and nausea.   Genitourinary: Negative for dysuria and pelvic pain.   Musculoskeletal: Positive for back pain. Negative for neck pain.   Skin: Negative for rash.   Neurological: Negative for weakness and headaches.   Hematological: Does not bruise/bleed easily.   All other systems reviewed and are negative.       Physical Exam     Initial Vitals [22 0844]   BP Pulse Resp Temp SpO2   123/60 98 18 98.4 °F (36.9 °C) 96 %      MAP       --          Physical Exam  Nursing Notes and Vital Signs Reviewed.  Constitutional: Patient is in no acute distress. Well-developed and well-nourished.  Head: Atraumatic. Normocephalic.  Eyes: PERRL. EOM intact. Conjunctivae are not pale. No scleral icterus.  ENT: Mucous membranes are moist. Oropharynx is clear and symmetric.    Neck: Supple. Full ROM. No lymphadenopathy.  Cardiovascular: Regular rate. Regular rhythm.  No murmurs, rubs, or gallops. Distal pulses are 2+ and symmetric.  Pulmonary/Chest: No respiratory distress. Clear to auscultation bilaterally. No wheezing or rales.  Abdominal: Soft and non-distended.  There is no tenderness.  No rebound, guarding, or rigidity. Good bowel sounds.  Genitourinary: No CVA tenderness  Musculoskeletal: Moves all extremities. No obvious deformities. No edema. No calf tenderness.  Skin: Warm and dry.  Neurological:  Alert, awake, and appropriate.  Normal speech.  No acute focal neurological deficits are appreciated.  Psychiatric: Normal affect. Good eye contact. Appropriate in content.     ED Course   Procedures  ED Vital Signs:  Vitals:    07/24/22 0844 07/24/22 0858 07/24/22 0902 07/24/22 0913   BP: 123/60  135/65    Pulse: 98  100 99   Resp: 18      Temp: 98.4 °F (36.9 °C)      TempSrc: Oral      SpO2: 96%  99%    Weight:  59.7 kg (131 lb 11.2 oz)      07/24/22 1029 07/24/22 1102 07/24/22 1202 07/24/22 1232   BP: (!) 149/69 (!) 147/70 (!) 145/68 (!) 150/72   Pulse: 99 97 93 98   Resp: 18 15 13 19   Temp:       TempSrc:       SpO2: 100% 100% 99% 100%   Weight:           Abnormal Lab Results:  Labs Reviewed   CBC W/ AUTO DIFFERENTIAL - Abnormal; Notable for the following components:       Result Value    Gran # (ANC) 9.0 (*)     Gran % 78.4 (*)     Lymph % 13.1 (*)     All other components within normal limits   COMPREHENSIVE METABOLIC PANEL - Abnormal; Notable for the following components:    Glucose 171 (*)     BUN 25 (*)     eGFR if  57 (*)     eGFR if non  50 (*)     All other components within normal limits   URINALYSIS, REFLEX TO URINE CULTURE - Abnormal; Notable for the following components:    Appearance, UA Cloudy (*)     Protein, UA 1+ (*)     Glucose, UA 4+ (*)     Ketones, UA Trace (*)     Occult Blood UA 1+ (*)     Leukocytes, UA 3+ (*)     All other components within normal limits    Narrative:     Specimen Source->Urine   URINALYSIS  MICROSCOPIC - Abnormal; Notable for the following components:    RBC, UA 34 (*)     WBC, UA >100 (*)     WBC Clumps, UA Moderate (*)     Bacteria Many (*)     Yeast, UA Many (*)     All other components within normal limits    Narrative:     Specimen Source->Urine   CULTURE, URINE   TROPONIN I   TROPONIN I   CK        All Lab Results:  Results for orders placed or performed during the hospital encounter of 07/24/22   Troponin I #2   Result Value Ref Range    Troponin I <0.006 0.000 - 0.026 ng/mL   CBC auto differential   Result Value Ref Range    WBC 11.46 3.90 - 12.70 K/uL    RBC 4.38 4.00 - 5.40 M/uL    Hemoglobin 12.9 12.0 - 16.0 g/dL    Hematocrit 39.9 37.0 - 48.5 %    MCV 91 82 - 98 fL    MCH 29.5 27.0 - 31.0 pg    MCHC 32.3 32.0 - 36.0 g/dL    RDW 13.4 11.5 - 14.5 %    Platelets 319 150 - 450 K/uL    MPV 9.4 9.2 - 12.9 fL    Immature Granulocytes 0.3 0.0 - 0.5 %    Gran # (ANC) 9.0 (H) 1.8 - 7.7 K/uL    Immature Grans (Abs) 0.03 0.00 - 0.04 K/uL    Lymph # 1.5 1.0 - 4.8 K/uL    Mono # 0.8 0.3 - 1.0 K/uL    Eos # 0.1 0.0 - 0.5 K/uL    Baso # 0.04 0.00 - 0.20 K/uL    nRBC 0 0 /100 WBC    Gran % 78.4 (H) 38.0 - 73.0 %    Lymph % 13.1 (L) 18.0 - 48.0 %    Mono % 6.9 4.0 - 15.0 %    Eosinophil % 1.0 0.0 - 8.0 %    Basophil % 0.3 0.0 - 1.9 %    Differential Method Automated    Comprehensive metabolic panel   Result Value Ref Range    Sodium 142 136 - 145 mmol/L    Potassium 4.4 3.5 - 5.1 mmol/L    Chloride 107 95 - 110 mmol/L    CO2 24 23 - 29 mmol/L    Glucose 171 (H) 70 - 110 mg/dL    BUN 25 (H) 8 - 23 mg/dL    Creatinine 1.1 0.5 - 1.4 mg/dL    Calcium 10.0 8.7 - 10.5 mg/dL    Total Protein 7.6 6.0 - 8.4 g/dL    Albumin 3.5 3.5 - 5.2 g/dL    Total Bilirubin 0.4 0.1 - 1.0 mg/dL    Alkaline Phosphatase 62 55 - 135 U/L    AST 13 10 - 40 U/L    ALT 13 10 - 44 U/L    Anion Gap 11 8 - 16 mmol/L    eGFR if African American 57 (A) >60 mL/min/1.73 m^2    eGFR if non African American 50 (A) >60 mL/min/1.73 m^2   Troponin I  #1   Result Value Ref Range    Troponin I <0.006 0.000 - 0.026 ng/mL   Urinalysis, Reflex to Urine Culture Urine, Clean Catch    Specimen: Urine   Result Value Ref Range    Specimen UA Urine, Catheterized     Color, UA Yellow Yellow, Straw, Darleen    Appearance, UA Cloudy (A) Clear    pH, UA 5.0 5.0 - 8.0    Specific Gravity, UA 1.020 1.005 - 1.030    Protein, UA 1+ (A) Negative    Glucose, UA 4+ (A) Negative    Ketones, UA Trace (A) Negative    Bilirubin (UA) Negative Negative    Occult Blood UA 1+ (A) Negative    Nitrite, UA Negative Negative    Urobilinogen, UA Negative <2.0 EU/dL    Leukocytes, UA 3+ (A) Negative   CPK   Result Value Ref Range    CPK 39 20 - 180 U/L   Urinalysis Microscopic   Result Value Ref Range    RBC, UA 34 (H) 0 - 4 /hpf    WBC, UA >100 (H) 0 - 5 /hpf    WBC Clumps, UA Moderate (A) None-Rare    Bacteria Many (A) None-Occ /hpf    Yeast, UA Many (A) None    Squam Epithel, UA 2 /hpf    Hyaline Casts, UA 0 0-1/lpf /lpf    Microscopic Comment SEE COMMENT                      Imaging Results:  Imaging Results          CT Thoracic Spine Without Contrast (Final result)  Result time 07/24/22 10:39:04    Final result by Kiko Landers MD (07/24/22 10:39:04)                 Impression:      No acute thoracic spine injury.    Mild degenerative endplate changes throughout the thoracic spine without bony spinal canal or neural foraminal stenosis.    Lower cervical spine degenerative changes as detailed above.      Electronically signed by: Kiko Landers  Date:    07/24/2022  Time:    10:39             Narrative:    EXAMINATION:  CT THORACIC SPINE WITHOUT CONTRAST    CLINICAL HISTORY:  Mid-back pain, compression fracture suspected;    TECHNIQUE:  Contiguous axial images obtained through the thoracic spine without administration of contrast.  Sagittal coronal reconstructions performed.    COMPARISON:  None    FINDINGS:  There is no acute fracture compression deformity.  Bones are somewhat  demineralized.  No aggressive lytic or blastic lesions seen.  Multilevel degenerative endplate changes noted throughout the thoracic spine without significant bony spinal canal stenosis.  No significant facet arthropathy within the thoracic spine.  No foraminal stenosis the thoracic spine.    Lower cervical degenerative disc changes and facet arthropathy noted with mild bony spinal canal and neural foraminal narrowing at C5-C6 and to a lesser extent C4-C5.    Scattered atherosclerotic calcifications noted.  Visualized soft tissues are otherwise unremarkable.  Visualized lungs are clear aside from mild dependent subsegmental atelectasis or scarring in the bases.  No significant mediastinal or hilar mass or lymphadenopathy seen.                               CT Head Without Contrast (Final result)  Result time 07/24/22 10:34:50    Final result by Kiko Landers MD (07/24/22 10:34:50)                 Impression:      No acute calvarial or intracranial injury.    Diffuse cerebral volume loss and nonspecific white matter changes likely related to chronic microvascular ischemia.  Several unchanged remote lacunar infarcts as above.    Bilateral paranasal sinus disease as above.      Electronically signed by: Kiko Landers  Date:    07/24/2022  Time:    10:34             Narrative:    EXAMINATION:  CT HEAD WITHOUT CONTRAST    CLINICAL HISTORY:  Head trauma, minor (Age >= 65y);    TECHNIQUE:  Low dose axial images were obtained through the head.  Coronal and sagittal reformations were also performed. Contrast was not administered.    COMPARISON:  CT head dated 03/09/2022    FINDINGS:  There is diffuse cerebral volume loss with associated sulcal and ventricular prominence.  Scattered and confluent hypoattenuation noted throughout the periventricular and subcortical white matter.  Remote right basal ganglia, left thalamic, and right pontine lacunar infarcts are unchanged.  Gray-white matter differentiation is  otherwise preserved.  No CT evidence of acute territorial infarct, intracranial hemorrhage, or mass lesion.  No midline shift or mass effect.    Basal cisterns are clear.  Bilateral carotid siphon and distal vertebral artery calcifications noted.    Calvarium is intact without acute injury.  No significant soft tissue injury of the scalp.  Bilateral aphakia noted.  Orbits and globes otherwise within normal limits.  Frothy appearing secretions noted within the bilateral sphenoid sinuses and posterior ethmoid air cells.  Mucosal thickening of the left greater than right maxillary sinus also noted.  Mastoid air cells appear clear.  Cerumen noted within the external auditory canals.                               CT Lumbar Spine Without Contrast (Final result)  Result time 07/24/22 10:52:24    Final result by Kiko Landers MD (07/24/22 10:52:24)                 Impression:      No acute lumbar spine injury.    Multilevel degenerative disc changes and facet arthropathy as detailed above, most prominent at L3-L4, L4-L5, and L5-S1.  Some degree of thecal sac stenosis at L3-L4 and L4-L5 and to lesser extent L2-L3.  Varying degrees of neural foraminal stenosis at the L3-L4, L4-L5, and L5-S1 levels.      Electronically signed by: Kiko Landers  Date:    07/24/2022  Time:    10:52             Narrative:    EXAMINATION:  CT LUMBAR SPINE WITHOUT CONTRAST    CLINICAL HISTORY:  Low back pain, trauma;    TECHNIQUE:  Low-dose axial, sagittal and coronal reformations are obtained through the lumbar spine.  Contrast was not administered.    COMPARISON:  Lumbar spine x-ray dated 01/06/2022    FINDINGS:  There is no acute fracture or compression deformity.  No aggressive lytic or blastic lesions seen.  Patient appears to be status post right L5 hemilaminotomy.    There is straightening of the lumbar spine.  Alignment otherwise unremarkable with no significant listhesis.    Multilevel loss of intervertebral disc height with  associated endplate osteophytosis noted, most prominently at L3-L4, L4-L5, and L5-S1.  Associated vacuum phenomena noted at L4-L5 and to a lesser extent L3-4.    Bilateral facet arthropathy noted at the L3-L4, L4-L5, and L5-S1 levels.    Some degree of thecal sac stenosis noted at the L3-L4 and L4-L5 levels as well as to a lesser extent at the L2-L3 level.  Varying degrees of bilateral neural foraminal stenosis noted at the L3-L4, L4-L5, and L5-S1 levels.    Relative fatty atrophy of the bilateral paraspinal muscles noted.  Paraspinal soft tissues are otherwise unremarkable.  Atherosclerotic calcifications noted throughout the abdomen and pelvis including prominent calcification of the splenic artery.  Surgical clips noted within the right upper quadrant.  Patient status post cholecystectomy.  Visualized intra-abdominal and pelvic contents otherwise unremarkable.                               X-Ray Chest AP Portable (Final result)  Result time 07/24/22 09:54:16    Final result by Kiko Landers MD (07/24/22 09:54:16)                 Impression:      Mild bilateral interstitial prominence may be related to interstitial edema or atypical infection.      Electronically signed by: Kiko Landers  Date:    07/24/2022  Time:    09:54             Narrative:    EXAMINATION:  XR CHEST AP PORTABLE    CLINICAL HISTORY:  Chest Pain;    TECHNIQUE:  Single frontal view of the chest was performed.    COMPARISON:  Chest x-ray dated 01/06/2022 and 03/12/2018    FINDINGS:  Direct comparison limited due to significant patient angulation and rotation current exam.  Cardiomediastinal silhouette grossly unchanged in size and contour.  Aortic arch calcification again noted.  Mild bilateral interstitial prominence noted.  Lungs otherwise appear clear without focal consolidation.  No significant pleural effusion or pneumothorax seen.  No acute bony abnormality.  Osteopenia and degenerative changes noted.                                  The EKG was ordered, reviewed, and independently interpreted by the ED provider.  Interpretation time: 09:18  Rate: 98 BPM  Rhythm: normal sinus rhythm  Interpretation: Low voltage QRS. Cannot rule out anteroseptal infarct, age undetermined, No acute ST changes. No STEMI.             The Emergency Provider reviewed the vital signs and test results, which are outlined above.     ED Discussion       12:18 PM: Daughter is at bedside, reports pt has dementia. Daughter states pt is in assisted living facility but will discuss moving her to a higher level of care. CT showed L spine intact. Discharging pt with rx for ceftriaxone for UTI.    12:20 PM: Reassessed pt at this time. Discussed with pt all pertinent ED information and results. Discussed pt dx and plan of tx. Gave pt all f/u and return to the ED instructions. All questions and concerns were addressed at this time. Pt expresses understanding of information and instructions, and is comfortable with plan to discharge. Pt is stable for discharge.    I discussed with patient and/or family/caretaker that evaluation in the ED does not suggest any emergent or life threatening medical conditions requiring immediate intervention beyond what was provided in the ED, and I believe patient is safe for discharge.  Regardless, an unremarkable evaluation in the ED does not preclude the development or presence of a serious of life threatening condition. As such, patient was instructed to return immediately for any worsening or change in current symptoms.        ED Course as of 07/24/22 1358   Sun Jul 24, 2022   1209 CT Thoracic Spine Without Contrast [TD]      ED Course User Index  [TD] Bettina Shafer Do, MD     Medical Decision Making:   Clinical Tests:   Lab Tests: Ordered and Reviewed  Radiological Study: Reviewed and Ordered  Medical Tests: Ordered and Reviewed           ED Medication(s):  Medications   sodium chloride 0.9% bolus 1,000 mL (0 mLs Intravenous Stopped  7/24/22 1057)   cefTRIAXone (ROCEPHIN) 1 g/50 mL D5W IVPB (0 g Intravenous Stopped 7/24/22 1057)       Discharge Medication List as of 7/24/2022 12:22 PM           Follow-up Information     Rob Sauceda MD In 1 day.    Specialty: Internal Medicine  Contact information:  [facility]  [address]  [place] [address]  [phone]                             Scribe Attestation:   Scribe #1: I performed the above scribed service and the documentation accurately describes the services I performed. I attest to the accuracy of the note.     Attending:   Physician Attestation Statement for Scribe #1: I, Bettina Shafer Do, MD, personally performed the services described in this documentation, as scribed by Kai Zurita, in my presence, and it is both accurate and complete.           Clinical Impression       ICD-10-CM ICD-9-CM   1. Fall, initial encounter  W19.XXXA E888.9   2. Chest pain  R07.9 786.50   3. Osteoarthritis of lumbar spine, unspecified spinal osteoarthritis complication status  M47.816 721.3   4. Strain of lumbar region, initial encounter  S39.012A 847.2   5. Urinary tract infection with hematuria, site unspecified  N39.0 599.0    R31.9 599.70       Disposition:   Disposition: Discharged  Condition: Stable         Bettina Shafer Do, MD  07/24/22 5333

## 2022-07-26 LAB — BACTERIA UR CULT: NO GROWTH

## 2022-08-31 DIAGNOSIS — E11.9 TYPE 2 DIABETES MELLITUS WITHOUT COMPLICATION: ICD-10-CM

## 2022-09-15 ENCOUNTER — PATIENT OUTREACH (OUTPATIENT)
Dept: ADMINISTRATIVE | Facility: CLINIC | Age: 72
End: 2022-09-15
Payer: MEDICARE

## 2022-10-18 ENCOUNTER — PATIENT MESSAGE (OUTPATIENT)
Dept: ADMINISTRATIVE | Facility: HOSPITAL | Age: 72
End: 2022-10-18
Payer: MEDICARE

## 2022-11-14 ENCOUNTER — PATIENT MESSAGE (OUTPATIENT)
Dept: ADMINISTRATIVE | Facility: HOSPITAL | Age: 72
End: 2022-11-14
Payer: MEDICARE

## 2022-11-28 ENCOUNTER — PATIENT OUTREACH (OUTPATIENT)
Dept: ADMINISTRATIVE | Facility: HOSPITAL | Age: 72
End: 2022-11-28
Payer: MEDICARE

## 2023-09-28 NOTE — PATIENT INSTRUCTIONS
Please go to ER as we discussed for further evaluation.   Take Cymbalta as instructed by primary doctor everyday and also take it in am of surgery but that time only with few sips of water. Follow up with your primary doctor regarding management of your fibromyalgia